# Patient Record
Sex: FEMALE | Race: WHITE | NOT HISPANIC OR LATINO | Employment: FULL TIME | URBAN - METROPOLITAN AREA
[De-identification: names, ages, dates, MRNs, and addresses within clinical notes are randomized per-mention and may not be internally consistent; named-entity substitution may affect disease eponyms.]

---

## 2017-01-04 ENCOUNTER — APPOINTMENT (OUTPATIENT)
Dept: DIABETES SERVICES | Facility: HOSPITAL | Age: 62
End: 2017-01-04

## 2017-01-04 PROCEDURE — G0109 DIAB MANAGE TRN IND/GROUP: HCPCS

## 2017-01-06 ENCOUNTER — TELEPHONE (OUTPATIENT)
Dept: INTERNAL MEDICINE | Facility: CLINIC | Age: 62
End: 2017-01-06

## 2017-01-06 NOTE — TELEPHONE ENCOUNTER
Farxiga still denied, tried to contact the drug rep about trying to get the copay card to work, even if the insurance denies the prescription, no answer/left voicemail.       Would you like to try and prescribe Jardiance for this pt, since it is on the formulary? A trial and fail would get the Farxiga approved.     Disregard    Got a hold of Farxiga rep, sometimes [facility] and [facility] will not take the copay card..pt is going to try a different pharmacy and see if they will use the card and let us know, will send the script to whichever pharmacy the pt needs.    Copay card is only invalid when dealing with non covered medicare.

## 2017-01-11 ENCOUNTER — APPOINTMENT (OUTPATIENT)
Dept: DIABETES SERVICES | Facility: HOSPITAL | Age: 62
End: 2017-01-11

## 2017-01-11 PROCEDURE — G0109 DIAB MANAGE TRN IND/GROUP: HCPCS

## 2017-01-23 DIAGNOSIS — E11.9 TYPE 2 DIABETES MELLITUS WITHOUT COMPLICATION, WITHOUT LONG-TERM CURRENT USE OF INSULIN (HCC): ICD-10-CM

## 2017-01-23 NOTE — TELEPHONE ENCOUNTER
Pt called and stated that she found a pharmacy that would accept the farxiga copay card, would like script sent to [facility] pharmacy on [address]

## 2017-02-01 ENCOUNTER — TELEPHONE (OUTPATIENT)
Dept: INTERNAL MEDICINE | Facility: CLINIC | Age: 62
End: 2017-02-01

## 2017-02-01 DIAGNOSIS — Z12.31 ENCOUNTER FOR SCREENING MAMMOGRAM FOR BREAST CANCER: Primary | ICD-10-CM

## 2017-02-09 ENCOUNTER — OFFICE VISIT (OUTPATIENT)
Dept: ORTHOPEDIC SURGERY | Facility: CLINIC | Age: 62
End: 2017-02-09

## 2017-02-09 VITALS — TEMPERATURE: 98.2 F | BODY MASS INDEX: 29.62 KG/M2 | HEIGHT: 69 IN | WEIGHT: 200 LBS

## 2017-02-09 DIAGNOSIS — M17.12 PRIMARY OSTEOARTHRITIS OF LEFT KNEE: Primary | ICD-10-CM

## 2017-02-09 PROCEDURE — 20610 DRAIN/INJ JOINT/BURSA W/O US: CPT | Performed by: ORTHOPAEDIC SURGERY

## 2017-02-09 RX ORDER — LIDOCAINE HYDROCHLORIDE 10 MG/ML
4 INJECTION, SOLUTION INFILTRATION; PERINEURAL
Status: COMPLETED | OUTPATIENT
Start: 2017-02-09 | End: 2017-02-09

## 2017-02-09 RX ORDER — METHYLPREDNISOLONE ACETATE 80 MG/ML
80 INJECTION, SUSPENSION INTRA-ARTICULAR; INTRALESIONAL; INTRAMUSCULAR; SOFT TISSUE
Status: COMPLETED | OUTPATIENT
Start: 2017-02-09 | End: 2017-02-09

## 2017-02-09 RX ORDER — BUPIVACAINE HYDROCHLORIDE 5 MG/ML
2 INJECTION, SOLUTION PERINEURAL
Status: COMPLETED | OUTPATIENT
Start: 2017-02-09 | End: 2017-02-09

## 2017-02-09 RX ADMIN — METHYLPREDNISOLONE ACETATE 80 MG: 80 INJECTION, SUSPENSION INTRA-ARTICULAR; INTRALESIONAL; INTRAMUSCULAR; SOFT TISSUE at 10:22

## 2017-02-09 RX ADMIN — BUPIVACAINE HYDROCHLORIDE 2 ML: 5 INJECTION, SOLUTION PERINEURAL at 10:22

## 2017-02-09 RX ADMIN — LIDOCAINE HYDROCHLORIDE 4 ML: 10 INJECTION, SOLUTION INFILTRATION; PERINEURAL at 10:22

## 2017-02-09 NOTE — PROGRESS NOTES
2/9/2017    Namrata Luz is here today for worsening knee pain. Pt has undergone injection of the knee in the past with good resolution of symptoms. Pt is requesting a repeat injection.     KNEE Injection Procedure Note:    Large Joint Arthrocentesis  Date/Time: 2/9/2017 10:22 AM  Consent given by: patient  Site marked: site marked  Timeout: Immediately prior to procedure a time out was called to verify the correct patient, procedure, equipment, support staff and site/side marked as required   Supporting Documentation  Indications: pain and joint swelling   Procedure Details  Location: knee - L knee  Preparation: Patient was prepped and draped in the usual sterile fashion  Needle size: 18 G  Approach: anterolateral  Medications administered: 4 mL lidocaine 1 %; 80 mg methylPREDNISolone acetate 80 MG/ML; 2 mL bupivacaine            At the conclusion of the injection I discussed the importance of continued quad strengthening exercises on a daily basis. I will see the patient back if the symptoms should fail to improve or worsen.    Emanuel Jack MD  2/9/2017

## 2017-02-17 RX ORDER — METOPROLOL SUCCINATE 100 MG/1
100 TABLET, EXTENDED RELEASE ORAL DAILY
Qty: 90 TABLET | Refills: 3 | Status: SHIPPED | OUTPATIENT
Start: 2017-02-17 | End: 2017-03-24 | Stop reason: SDUPTHER

## 2017-02-17 RX ORDER — METOPROLOL SUCCINATE 100 MG/1
100 TABLET, EXTENDED RELEASE ORAL DAILY
Qty: 30 TABLET | Refills: 0 | Status: SHIPPED | OUTPATIENT
Start: 2017-02-17 | End: 2017-02-17 | Stop reason: SDUPTHER

## 2017-02-17 NOTE — TELEPHONE ENCOUNTER
Pt ran out of prescription, needs 30 day sent to [facility] and [facility] Met[facility]rolol.

## 2017-02-20 ENCOUNTER — HOSPITAL ENCOUNTER (OUTPATIENT)
Dept: MAMMOGRAPHY | Facility: HOSPITAL | Age: 62
Discharge: HOME | End: 2017-02-20
Admitting: INTERNAL MEDICINE

## 2017-02-20 DIAGNOSIS — Z12.31 ENCOUNTER FOR SCREENING MAMMOGRAM FOR BREAST CANCER: ICD-10-CM

## 2017-02-20 PROCEDURE — G0202 SCR MAMMO BI INCL CAD: HCPCS

## 2017-02-20 PROCEDURE — 77063 BREAST TOMOSYNTHESIS BI: CPT

## 2017-03-05 LAB
ALBUMIN SERPL-MCNC: 4.2 G/DL (ref 3.6–4.8)
ALBUMIN/GLOB SERPL: 1.6 {RATIO} (ref 1.1–2.5)
ALP SERPL-CCNC: 70 IU/L (ref 39–117)
ALT SERPL-CCNC: 26 IU/L (ref 0–32)
AST SERPL-CCNC: 22 IU/L (ref 0–40)
BILIRUB SERPL-MCNC: 0.4 MG/DL (ref 0–1.2)
BUN SERPL-MCNC: 19 MG/DL (ref 8–27)
BUN/CREAT SERPL: 20 (ref 11–26)
CALCIUM SERPL-MCNC: 9.6 MG/DL (ref 8.7–10.3)
CHLORIDE SERPL-SCNC: 103 MMOL/L (ref 96–106)
CHOLEST SERPL-MCNC: 137 MG/DL (ref 100–199)
CO2 SERPL-SCNC: 23 MMOL/L (ref 18–29)
CREAT SERPL-MCNC: 0.97 MG/DL (ref 0.57–1)
GLOBULIN SER CALC-MCNC: 2.6 G/DL (ref 1.5–4.5)
GLUCOSE SERPL-MCNC: 115 MG/DL (ref 65–99)
HBA1C MFR BLD: 6.2 % (ref 4.8–5.6)
HCV AB S/CO SERPL IA: <0.1 S/CO RATIO (ref 0–0.9)
HDLC SERPL-MCNC: 36 MG/DL
LDLC SERPL CALC-MCNC: 66 MG/DL (ref 0–99)
POTASSIUM SERPL-SCNC: 4.7 MMOL/L (ref 3.5–5.2)
PROT SERPL-MCNC: 6.8 G/DL (ref 6–8.5)
SODIUM SERPL-SCNC: 141 MMOL/L (ref 134–144)
TRIGL SERPL-MCNC: 176 MG/DL (ref 0–149)
TSH SERPL DL<=0.005 MIU/L-ACNC: 2.36 UIU/ML (ref 0.45–4.5)
VLDLC SERPL CALC-MCNC: 35 MG/DL (ref 5–40)

## 2017-03-08 ENCOUNTER — OFFICE VISIT (OUTPATIENT)
Dept: INTERNAL MEDICINE | Facility: CLINIC | Age: 62
End: 2017-03-08

## 2017-03-08 VITALS
HEART RATE: 69 BPM | WEIGHT: 203 LBS | OXYGEN SATURATION: 99 % | SYSTOLIC BLOOD PRESSURE: 118 MMHG | HEIGHT: 69 IN | RESPIRATION RATE: 18 BRPM | DIASTOLIC BLOOD PRESSURE: 68 MMHG | BODY MASS INDEX: 30.07 KG/M2

## 2017-03-08 DIAGNOSIS — R79.89 ABNORMAL LIVER FUNCTION TESTS: ICD-10-CM

## 2017-03-08 DIAGNOSIS — E78.2 MIXED HYPERLIPIDEMIA: ICD-10-CM

## 2017-03-08 DIAGNOSIS — E03.9 ACQUIRED HYPOTHYROIDISM: ICD-10-CM

## 2017-03-08 DIAGNOSIS — E11.9 TYPE 2 DIABETES MELLITUS WITHOUT COMPLICATION, WITHOUT LONG-TERM CURRENT USE OF INSULIN (HCC): Primary | ICD-10-CM

## 2017-03-08 DIAGNOSIS — I10 ESSENTIAL HYPERTENSION: ICD-10-CM

## 2017-03-08 DIAGNOSIS — Z11.59 NEED FOR HEPATITIS C SCREENING TEST: ICD-10-CM

## 2017-03-08 DIAGNOSIS — Z78.0 POSTMENOPAUSAL: ICD-10-CM

## 2017-03-08 PROCEDURE — 99214 OFFICE O/P EST MOD 30 MIN: CPT | Performed by: INTERNAL MEDICINE

## 2017-03-08 PROCEDURE — 77080 DXA BONE DENSITY AXIAL: CPT | Performed by: INTERNAL MEDICINE

## 2017-03-08 RX ORDER — LANCETS
EACH MISCELLANEOUS
COMMUNITY
Start: 2017-02-09 | End: 2017-06-21 | Stop reason: SDUPTHER

## 2017-03-08 RX ORDER — BLOOD SUGAR DIAGNOSTIC
STRIP MISCELLANEOUS
COMMUNITY
Start: 2017-03-02 | End: 2017-06-21 | Stop reason: SDUPTHER

## 2017-03-08 NOTE — PROGRESS NOTES
Chief Complaint  Namrata Luz is a 61 y.o. female who presents for Hypertension; Hyperlipidemia; Hypothyroidism; Diabetes (F/U for Farxiga); and Follow-up (3 month, Dexa prior)  .    History of Present Illness   DM2 - She is doing her PT exercises for her knees.  The farxiga can cause some constipation so she had not been using her colestipol.  She has lost some weight.  She went to all of the diabetic classes.  She is trying to do a Paleo type diet.  She checks her bs daily.    HTN - no cp or palp or soa or edema.      HLD - no myalgias.      Hypothyroid - no symptoms.      DEXA reviewed.  She has excellent bone density.    Review of Systems   Constitutional: Negative for chills and fever.   Respiratory: Negative for chest tightness and shortness of breath.    Cardiovascular: Negative for chest pain, palpitations and leg swelling.   Gastrointestinal: Negative for constipation.   Endocrine: Negative for polydipsia and polyuria.   Neurological: Negative for dizziness and light-headedness.       Patient Active Problem List   Diagnosis   • Abnormal liver function tests   • Anxiety   • Asthma   • Mixed anxiety depressive disorder   • Diarrhea   • Headache   • Hyperlipidemia   • Hypertension   • Hypothyroidism   • Lung mass   • Neck pain   • Psoriasis   • Vitamin D deficiency   • Right medial knee pain   • Effusion of right knee   • Choking   • Type 2 diabetes mellitus without complication   • Hoarseness       Past Medical History   Diagnosis Date   • Abnormal LFTs    • Allergic rhinitis    • Anxiety    • Arthritis    • Asthma    • Depression    • Disease of thyroid gland    • Endometriosis    • Fatty liver    • Hyperlipidemia    • Hypertension    • Lung nodule    • Mitral valve prolapse    • Prediabetes    • Psoriasis    • Renal insufficiency    • Tachycardia    • Vitamin D deficiency        Past Surgical History   Procedure Laterality Date   • Cholecystectomy     • Hysterectomy     • Tubal abdominal ligation     •  Cataract extraction, bilateral     • Back surgery     • Tibia fasciotomy         Family History   Problem Relation Age of Onset   • Vaginal cancer Mother    • Lung cancer Mother    • Arthritis Mother    • Diabetes Mother    • Coronary artery disease Father    • Arthritis Father    • Kidney cancer Father    • Coronary artery disease Brother    • Asthma Sister    • Osteoporosis Sister    • Osteoporosis Maternal Grandmother        Social History     Social History   • Marital status:      Spouse name: N/A   • Number of children: N/A   • Years of education: N/A     Occupational History   • Not on file.     Social History Main Topics   • Smoking status: Never Smoker   • Smokeless tobacco: Not on file   • Alcohol use Yes      Comment: 1-2 a month   • Drug use: No   • Sexual activity: Not on file     Other Topics Concern   • Not on file     Social History Narrative       Current Outpatient Prescriptions on File Prior to Visit   Medication Sig Dispense Refill   • albuterol (PROVENTIL HFA;VENTOLIN HFA) 108 (90 BASE) MCG/ACT inhaler Proventil  (90 Base) MCG/ACT Inhalation Aerosol Solution; Patient Sig: Proventil  (90 Base) MCG/ACT Inhalation Aerosol Solution INHALE 1 TO 2 PUFFS EVERY 4 TO 6 HOURS AS NEEDED.; 1; 5; 07-Apr-2014; Active     • atorvastatin (LIPITOR) 40 MG tablet TAKE 1 TABLET DAILY AS DIRECTED 90 tablet 0   • budesonide-formoterol (SYMBICORT) 80-4.5 MCG/ACT inhaler Inhale 2 puffs 2 (two) times a day. 1 inhaler 2   • clobetasol (TEMOVATE) 0.05 % external solution Apply  topically 2 (Two) Times a Day. 50 mL 2   • colestipol (COLESTID) 1 G tablet Take 1 tablet by mouth Daily. Take 1 to 2 tablets by mouth daily as needed. 45 tablet 3   • Dapagliflozin Propanediol (FARXIGA) 10 MG tablet Take 10 mg by mouth Daily. 30 tablet 5   • fenofibrate (TRICOR) 145 MG tablet Take 1 tablet by mouth daily. 90 tablet 3   • fluocinolone (SYNALAR) 0.01 % external solution Apply  topically 2 (Two) Times a Day. 60  "mL 2   • FLUoxetine (PROzac) 10 MG capsule TAKE ONE CAPSULE BY MOUTH DAILY 30 capsule 5   • levothyroxine (SYNTHROID, LEVOTHROID) 88 MCG tablet Take 1 tablet by mouth Daily. 90 tablet 3   • metFORMIN (GLUCOPHAGE) 1000 MG tablet TAKE 1 TABLET TWICE A  tablet 1   • metoprolol succinate XL (TOPROL-XL) 100 MG 24 hr tablet Take 1 tablet by mouth Daily. 90 tablet 3     No current facility-administered medications on file prior to visit.        Allergies   Allergen Reactions   • Morphine And Related    • Penicillins        Vitals:    03/08/17 1024   BP: 118/68   Pulse: 69   Resp: 18   SpO2: 99%   Weight: 203 lb (92.1 kg)   Height: 68.5\" (174 cm)       Body mass index is 30.42 kg/(m^2).    Objective   Physical Exam   Constitutional: She is oriented to person, place, and time. She appears well-developed and well-nourished. No distress.   HENT:   Head: Normocephalic and atraumatic.   Eyes: Conjunctivae are normal. No scleral icterus.   Neck: Normal range of motion. Neck supple.   Cardiovascular: Normal rate, regular rhythm and normal heart sounds.  Exam reveals no gallop and no friction rub.    No murmur heard.  Pulmonary/Chest: Effort normal and breath sounds normal. No respiratory distress. She has no wheezes. She has no rales.   Musculoskeletal: She exhibits no edema.   Lymphadenopathy:     She has no cervical adenopathy.   Neurological: She is alert and oriented to person, place, and time. No cranial nerve deficit.   Psychiatric: She has a normal mood and affect. Her behavior is normal. Judgment and thought content normal.       Results for orders placed or performed in visit on 03/08/17   TSH   Result Value Ref Range    TSH 2.360 0.450 - 4.500 uIU/mL   Comprehensive Metabolic Panel   Result Value Ref Range    Glucose 115 (H) 65 - 99 mg/dL    BUN 19 8 - 27 mg/dL    Creatinine 0.97 0.57 - 1.00 mg/dL    eGFR Non African Am 63 >59 mL/min/1.73    eGFR African Am 73 >59 mL/min/1.73    BUN/Creatinine Ratio 20 11 - 26    " Sodium 141 134 - 144 mmol/L    Potassium 4.7 3.5 - 5.2 mmol/L    Chloride 103 96 - 106 mmol/L    Total CO2 23 18 - 29 mmol/L    Calcium 9.6 8.7 - 10.3 mg/dL    Total Protein 6.8 6.0 - 8.5 g/dL    Albumin 4.2 3.6 - 4.8 g/dL    Globulin 2.6 1.5 - 4.5 g/dL    A/G Ratio 1.6 1.1 - 2.5    Total Bilirubin 0.4 0.0 - 1.2 mg/dL    Alkaline Phosphatase 70 39 - 117 IU/L    AST (SGOT) 22 0 - 40 IU/L    ALT (SGPT) 26 0 - 32 IU/L   Lipid Panel   Result Value Ref Range    Total Cholesterol 137 100 - 199 mg/dL    Triglycerides 176 (H) 0 - 149 mg/dL    HDL Cholesterol 36 (L) >39 mg/dL    VLDL Cholesterol 35 5 - 40 mg/dL    LDL Cholesterol  66 0 - 99 mg/dL   Hemoglobin A1c   Result Value Ref Range    Hemoglobin A1C 6.2 (H) 4.8 - 5.6 %   Hepatitis C Antibody   Result Value Ref Range    Hep C Virus Ab <0.1 0.0 - 0.9 s/co ratio       Assessment/Plan   Diagnoses and all orders for this visit:    Type 2 diabetes mellitus without complication, without long-term current use of insulin  -     Hemoglobin A1c  -     Comprehensive Metabolic Panel    Essential hypertension  -     Comprehensive Metabolic Panel    Mixed hyperlipidemia  -     Comprehensive Metabolic Panel  -     Lipid Panel    Need for hepatitis C screening test  -     Hepatitis C Antibody    Abnormal liver function tests  -     Comprehensive Metabolic Panel    Acquired hypothyroidism  -     TSH    Other orders  -     Comprehensive Metabolic Panel  -     Lipid Panel  -     Hemoglobin A1c  -     Hepatitis C Antibody  -     ACCU-CHEK KARMA PLUS test strip;   -     ACCU-CHEK SOFTCLIX LANCETS lancets;         Discussion/Summary  Namrata is here for follow up.  She has done an excellent job of getting the diabetes under control.  She is doing well on Farxiga.   Her bp is very well controlled.  Her thyroid looks good.  Her liver enzymes have normalized.  Continue all current meds.   Return in about 6 months (around 9/8/2017) for Annual physical, with fasting labs prior.

## 2017-03-23 RX ORDER — ATORVASTATIN CALCIUM 40 MG/1
TABLET, FILM COATED ORAL
Qty: 90 TABLET | Refills: 2 | Status: SHIPPED | OUTPATIENT
Start: 2017-03-23 | End: 2018-05-02 | Stop reason: SDUPTHER

## 2017-03-24 RX ORDER — METOPROLOL SUCCINATE 100 MG/1
100 TABLET, EXTENDED RELEASE ORAL DAILY
Qty: 90 TABLET | Refills: 4 | Status: SHIPPED | OUTPATIENT
Start: 2017-03-24 | End: 2018-05-02 | Stop reason: SDUPTHER

## 2017-05-25 ENCOUNTER — OFFICE VISIT (OUTPATIENT)
Dept: ORTHOPEDIC SURGERY | Facility: CLINIC | Age: 62
End: 2017-05-25

## 2017-05-25 DIAGNOSIS — M17.12 PRIMARY OSTEOARTHRITIS OF LEFT KNEE: Primary | ICD-10-CM

## 2017-05-25 PROCEDURE — 20610 DRAIN/INJ JOINT/BURSA W/O US: CPT | Performed by: NURSE PRACTITIONER

## 2017-05-25 RX ORDER — METHYLPREDNISOLONE ACETATE 80 MG/ML
80 INJECTION, SUSPENSION INTRA-ARTICULAR; INTRALESIONAL; INTRAMUSCULAR; SOFT TISSUE
Status: COMPLETED | OUTPATIENT
Start: 2017-05-25 | End: 2017-05-25

## 2017-05-25 RX ORDER — BUPIVACAINE HYDROCHLORIDE 2.5 MG/ML
2 INJECTION, SOLUTION INFILTRATION; PERINEURAL
Status: COMPLETED | OUTPATIENT
Start: 2017-05-25 | End: 2017-05-25

## 2017-05-25 RX ORDER — LIDOCAINE HYDROCHLORIDE 10 MG/ML
4 INJECTION, SOLUTION INFILTRATION; PERINEURAL
Status: COMPLETED | OUTPATIENT
Start: 2017-05-25 | End: 2017-05-25

## 2017-05-25 RX ADMIN — METHYLPREDNISOLONE ACETATE 80 MG: 80 INJECTION, SUSPENSION INTRA-ARTICULAR; INTRALESIONAL; INTRAMUSCULAR; SOFT TISSUE at 11:39

## 2017-05-25 RX ADMIN — LIDOCAINE HYDROCHLORIDE 4 ML: 10 INJECTION, SOLUTION INFILTRATION; PERINEURAL at 11:39

## 2017-05-25 RX ADMIN — BUPIVACAINE HYDROCHLORIDE 2 ML: 2.5 INJECTION, SOLUTION INFILTRATION; PERINEURAL at 11:39

## 2017-06-21 RX ORDER — FLUOXETINE 10 MG/1
10 CAPSULE ORAL DAILY
Qty: 90 CAPSULE | Refills: 3 | Status: SHIPPED | OUTPATIENT
Start: 2017-06-21 | End: 2018-08-12 | Stop reason: SDUPTHER

## 2017-06-21 RX ORDER — LANCETS
EACH MISCELLANEOUS
Qty: 100 EACH | Refills: 3 | Status: SHIPPED | OUTPATIENT
Start: 2017-06-21 | End: 2018-11-14 | Stop reason: SDUPTHER

## 2017-06-21 RX ORDER — BLOOD SUGAR DIAGNOSTIC
STRIP MISCELLANEOUS
Qty: 100 EACH | Refills: 3 | Status: SHIPPED | OUTPATIENT
Start: 2017-06-21 | End: 2018-06-01 | Stop reason: HOSPADM

## 2017-07-24 RX ORDER — FENOFIBRATE 145 MG/1
TABLET, COATED ORAL
Qty: 90 TABLET | Refills: 2 | Status: SHIPPED | OUTPATIENT
Start: 2017-07-24 | End: 2018-05-02 | Stop reason: SDUPTHER

## 2017-07-28 DIAGNOSIS — E11.9 TYPE 2 DIABETES MELLITUS WITHOUT COMPLICATION, WITHOUT LONG-TERM CURRENT USE OF INSULIN (HCC): ICD-10-CM

## 2017-07-28 RX ORDER — DAPAGLIFLOZIN 10 MG/1
TABLET, FILM COATED ORAL
Qty: 30 TABLET | Refills: 5 | Status: SHIPPED | OUTPATIENT
Start: 2017-07-28 | End: 2017-08-29 | Stop reason: SDUPTHER

## 2017-08-29 DIAGNOSIS — E11.9 TYPE 2 DIABETES MELLITUS WITHOUT COMPLICATION, WITHOUT LONG-TERM CURRENT USE OF INSULIN (HCC): ICD-10-CM

## 2017-08-29 NOTE — TELEPHONE ENCOUNTER
Pt stated that [facility] will no longer fill the script for free, [facility]

## 2017-08-30 ENCOUNTER — TELEPHONE (OUTPATIENT)
Dept: INTERNAL MEDICINE | Facility: CLINIC | Age: 62
End: 2017-08-30

## 2017-08-30 NOTE — TELEPHONE ENCOUNTER
Pt stated that the farxiga copay card is not working anymore, insurance will not cover until pt tries and fails jardiance we can get the farxiga approved. Or if the pt tries jardiance and tolerates it well we will send in a script for that and insurance will cover it.

## 2017-09-05 ENCOUNTER — TELEPHONE (OUTPATIENT)
Dept: INTERNAL MEDICINE | Facility: CLINIC | Age: 62
End: 2017-09-05

## 2017-09-05 NOTE — TELEPHONE ENCOUNTER
Pt called and stated that she is tolerating the Jardiance well and would like a script of that sent in to her [facility].

## 2017-09-08 ENCOUNTER — CLINICAL SUPPORT (OUTPATIENT)
Dept: ORTHOPEDIC SURGERY | Facility: CLINIC | Age: 62
End: 2017-09-08

## 2017-09-08 VITALS — TEMPERATURE: 97.3 F | WEIGHT: 206 LBS | HEIGHT: 68 IN | BODY MASS INDEX: 31.22 KG/M2

## 2017-09-08 DIAGNOSIS — R52 PAIN: Primary | ICD-10-CM

## 2017-09-08 PROCEDURE — 20610 DRAIN/INJ JOINT/BURSA W/O US: CPT | Performed by: NURSE PRACTITIONER

## 2017-09-08 RX ORDER — BUPIVACAINE HYDROCHLORIDE 5 MG/ML
2 INJECTION, SOLUTION EPIDURAL; INTRACAUDAL
Status: COMPLETED | OUTPATIENT
Start: 2017-09-08 | End: 2017-09-08

## 2017-09-08 RX ORDER — METHYLPREDNISOLONE ACETATE 80 MG/ML
80 INJECTION, SUSPENSION INTRA-ARTICULAR; INTRALESIONAL; INTRAMUSCULAR; SOFT TISSUE
Status: COMPLETED | OUTPATIENT
Start: 2017-09-08 | End: 2017-09-08

## 2017-09-08 RX ADMIN — BUPIVACAINE HYDROCHLORIDE 2 ML: 5 INJECTION, SOLUTION EPIDURAL; INTRACAUDAL at 16:01

## 2017-09-08 RX ADMIN — METHYLPREDNISOLONE ACETATE 80 MG: 80 INJECTION, SUSPENSION INTRA-ARTICULAR; INTRALESIONAL; INTRAMUSCULAR; SOFT TISSUE at 16:01

## 2017-09-08 NOTE — PROGRESS NOTES
9/8/2017    Namrata Luz is here today for worsening knee pain. Pt has undergone injection of the knee in the past with good resolution of symptoms. Pt is requesting a repeat injection.     KNEE Injection Procedure Note:    Large Joint Arthrocentesis  Date/Time: 9/8/2017 4:01 PM  Consent given by: patient  Timeout: Immediately prior to procedure a time out was called to verify the correct patient, procedure, equipment, support staff and site/side marked as required   Supporting Documentation  Indications: pain   Procedure Details  Location: knee - L knee  Preparation: Patient was prepped and draped in the usual sterile fashion  Needle size: 22 G  Approach: anterolateral  Medications administered: 2 mL bupivacaine (PF) 0.5 %; 80 mg methylPREDNISolone acetate 80 MG/ML  Patient tolerance: patient tolerated the procedure well with no immediate complications          At the conclusion of the injection I discussed the importance of continued quad strengthening exercises on a daily basis. I will see the patient back if the symptoms should fail to improve or worsen.    Holly Smallwood APRN  9/8/2017

## 2017-09-13 ENCOUNTER — HOSPITAL ENCOUNTER (OUTPATIENT)
Dept: CARDIOLOGY | Facility: HOSPITAL | Age: 62
Discharge: HOME | End: 2017-09-13

## 2017-09-13 ENCOUNTER — HOSPITAL ENCOUNTER (OUTPATIENT)
Dept: CARDIOLOGY | Facility: HOSPITAL | Age: 62
Discharge: HOME | End: 2017-09-13
Admitting: INTERNAL MEDICINE

## 2017-09-13 ENCOUNTER — APPOINTMENT (OUTPATIENT)
Dept: GENERAL RADIOLOGY | Facility: HOSPITAL | Age: 62
End: 2017-09-13

## 2017-09-13 ENCOUNTER — HOSPITAL ENCOUNTER (EMERGENCY)
Facility: HOSPITAL | Age: 62
Discharge: HOME | End: 2017-09-13
Attending: EMERGENCY MEDICINE | Admitting: EMERGENCY MEDICINE

## 2017-09-13 VITALS
HEART RATE: 80 BPM | WEIGHT: 206 LBS | BODY MASS INDEX: 31.22 KG/M2 | OXYGEN SATURATION: 92 % | HEIGHT: 68 IN | DIASTOLIC BLOOD PRESSURE: 51 MMHG | TEMPERATURE: 98.3 F | RESPIRATION RATE: 18 BRPM | SYSTOLIC BLOOD PRESSURE: 90 MMHG

## 2017-09-13 VITALS
SYSTOLIC BLOOD PRESSURE: 96 MMHG | WEIGHT: 206 LBS | HEART RATE: 81 BPM | BODY MASS INDEX: 31.22 KG/M2 | OXYGEN SATURATION: 95 % | DIASTOLIC BLOOD PRESSURE: 66 MMHG | HEIGHT: 68 IN

## 2017-09-13 VITALS
WEIGHT: 206 LBS | RESPIRATION RATE: 20 BRPM | OXYGEN SATURATION: 94 % | HEIGHT: 68 IN | DIASTOLIC BLOOD PRESSURE: 66 MMHG | HEART RATE: 69 BPM | SYSTOLIC BLOOD PRESSURE: 95 MMHG | BODY MASS INDEX: 31.22 KG/M2

## 2017-09-13 DIAGNOSIS — E78.2 MIXED HYPERLIPIDEMIA: ICD-10-CM

## 2017-09-13 DIAGNOSIS — E11.9 TYPE 2 DIABETES MELLITUS WITHOUT COMPLICATION, WITHOUT LONG-TERM CURRENT USE OF INSULIN (HCC): ICD-10-CM

## 2017-09-13 DIAGNOSIS — R89.9 ABNORMAL LABORATORY TEST: ICD-10-CM

## 2017-09-13 DIAGNOSIS — R07.9 CHEST PAIN, UNSPECIFIED TYPE: Primary | ICD-10-CM

## 2017-09-13 DIAGNOSIS — R07.9 ACUTE CHEST PAIN: Primary | ICD-10-CM

## 2017-09-13 DIAGNOSIS — I34.1 MITRAL VALVE PROLAPSE: ICD-10-CM

## 2017-09-13 DIAGNOSIS — I10 ESSENTIAL HYPERTENSION: ICD-10-CM

## 2017-09-13 LAB
ALBUMIN SERPL-MCNC: 4.5 G/DL (ref 3.5–5.2)
ALBUMIN/GLOB SERPL: 1.4 G/DL
ALP SERPL-CCNC: 76 U/L (ref 39–117)
ALT SERPL W P-5'-P-CCNC: 56 U/L (ref 1–33)
ANION GAP SERPL CALCULATED.3IONS-SCNC: 13.3 MMOL/L
AST SERPL-CCNC: 52 U/L (ref 1–32)
BASOPHILS # BLD AUTO: 0.04 10*3/MM3 (ref 0–0.2)
BASOPHILS NFR BLD AUTO: 0.4 % (ref 0–1.5)
BH CV ECHO MEAS - ACS: 1.9 CM
BH CV ECHO MEAS - AO MAX PG: 7.4 MMHG
BH CV ECHO MEAS - AO ROOT AREA (BSA CORRECTED): 1.6
BH CV ECHO MEAS - AO ROOT AREA: 8.1 CM^2
BH CV ECHO MEAS - AO ROOT DIAM: 3.2 CM
BH CV ECHO MEAS - AO V2 MAX: 135.7 CM/SEC
BH CV ECHO MEAS - BSA(HAYCOCK): 2.1 M^2
BH CV ECHO MEAS - BSA: 2.1 M^2
BH CV ECHO MEAS - BZI_BMI: 31.3 KILOGRAMS/M^2
BH CV ECHO MEAS - BZI_METRIC_HEIGHT: 172.7 CM
BH CV ECHO MEAS - BZI_METRIC_WEIGHT: 93.4 KG
BH CV ECHO MEAS - CONTRAST EF 4CH: 71.1 ML/M^2
BH CV ECHO MEAS - EDV(MOD-SP4): 38 ML
BH CV ECHO MEAS - EDV(TEICH): 106.4 ML
BH CV ECHO MEAS - EF(CUBED): 74.2 %
BH CV ECHO MEAS - EF(MOD-SP4): 67 %
BH CV ECHO MEAS - EF(TEICH): 66 %
BH CV ECHO MEAS - ESV(MOD-SP4): 11 ML
BH CV ECHO MEAS - ESV(TEICH): 36.2 ML
BH CV ECHO MEAS - FS: 36.4 %
BH CV ECHO MEAS - IVS/LVPW: 1
BH CV ECHO MEAS - IVSD: 1.1 CM
BH CV ECHO MEAS - LAT PEAK E' VEL: 8 CM/SEC
BH CV ECHO MEAS - LV DIASTOLIC VOL/BSA (35-75): 18.4 ML/M^2
BH CV ECHO MEAS - LV MASS(C)D: 199.7 GRAMS
BH CV ECHO MEAS - LV MASS(C)DI: 96.5 GRAMS/M^2
BH CV ECHO MEAS - LV SYSTOLIC VOL/BSA (12-30): 5.3 ML/M^2
BH CV ECHO MEAS - LVIDD: 4.8 CM
BH CV ECHO MEAS - LVIDS: 3 CM
BH CV ECHO MEAS - LVLD AP4: 6.7 CM
BH CV ECHO MEAS - LVLS AP4: 5.1 CM
BH CV ECHO MEAS - LVPWD: 1.1 CM
BH CV ECHO MEAS - MED PEAK E' VEL: 7 CM/SEC
BH CV ECHO MEAS - MV A DUR: 0.11 SEC
BH CV ECHO MEAS - MV A MAX VEL: 64 CM/SEC
BH CV ECHO MEAS - MV DEC SLOPE: 157.1 CM/SEC^2
BH CV ECHO MEAS - MV DEC TIME: 0.22 SEC
BH CV ECHO MEAS - MV E MAX VEL: 34.4 CM/SEC
BH CV ECHO MEAS - MV E/A: 0.54
BH CV ECHO MEAS - MV P1/2T MAX VEL: 39.3 CM/SEC
BH CV ECHO MEAS - MV P1/2T: 73.2 MSEC
BH CV ECHO MEAS - MVA P1/2T LCG: 5.6 CM^2
BH CV ECHO MEAS - MVA(P1/2T): 3 CM^2
BH CV ECHO MEAS - PULM A REVS DUR: 0.11 SEC
BH CV ECHO MEAS - PULM A REVS VEL: 34.9 CM/SEC
BH CV ECHO MEAS - PULM DIAS VEL: 33 CM/SEC
BH CV ECHO MEAS - PULM S/D: 1.5
BH CV ECHO MEAS - PULM SYS VEL: 48 CM/SEC
BH CV ECHO MEAS - SI(CUBED): 39.1 ML/M^2
BH CV ECHO MEAS - SI(MOD-SP4): 13 ML/M^2
BH CV ECHO MEAS - SI(TEICH): 33.9 ML/M^2
BH CV ECHO MEAS - SV(CUBED): 81 ML
BH CV ECHO MEAS - SV(MOD-SP4): 27 ML
BH CV ECHO MEAS - SV(TEICH): 70.2 ML
BH CV STRESS BP STAGE 1: NORMAL
BH CV STRESS DURATION MIN STAGE 1: 3
BH CV STRESS DURATION MIN STAGE 2: 2
BH CV STRESS DURATION SEC STAGE 1: 0
BH CV STRESS DURATION SEC STAGE 2: 0
BH CV STRESS ECHO POST STRESS EJECTION FRACTION EF: 72 %
BH CV STRESS GRADE STAGE 1: 10
BH CV STRESS GRADE STAGE 2: 12
BH CV STRESS HR STAGE 1: 126
BH CV STRESS HR STAGE 2: 142
BH CV STRESS METS STAGE 1: 5
BH CV STRESS METS STAGE 2: 7.5
BH CV STRESS PROTOCOL 1: NORMAL
BH CV STRESS SPEED STAGE 1: 1.7
BH CV STRESS SPEED STAGE 2: 2.5
BH CV STRESS STAGE 1: 1
BH CV STRESS STAGE 2: 2
BH CV XLRA - TDI S': 10 CM/SEC
BILIRUB SERPL-MCNC: 0.6 MG/DL (ref 0.1–1.2)
BILIRUB UR QL STRIP: NEGATIVE
BUN BLD-MCNC: 20 MG/DL (ref 8–23)
BUN/CREAT SERPL: 19.8 (ref 7–25)
CALCIUM SPEC-SCNC: 9.6 MG/DL (ref 8.6–10.5)
CHLORIDE SERPL-SCNC: 101 MMOL/L (ref 98–107)
CLARITY UR: CLEAR
CO2 SERPL-SCNC: 27.7 MMOL/L (ref 22–29)
COLOR UR: YELLOW
CREAT BLD-MCNC: 1.01 MG/DL (ref 0.57–1)
DEPRECATED RDW RBC AUTO: 47.6 FL (ref 37–54)
E/E' RATIO: 4.5
EOSINOPHIL # BLD AUTO: 0.51 10*3/MM3 (ref 0–0.7)
EOSINOPHIL NFR BLD AUTO: 5.5 % (ref 0.3–6.2)
ERYTHROCYTE [DISTWIDTH] IN BLOOD BY AUTOMATED COUNT: 14.3 % (ref 11.7–13)
GFR SERPL CREATININE-BSD FRML MDRD: 56 ML/MIN/1.73
GLOBULIN UR ELPH-MCNC: 3.2 GM/DL
GLUCOSE BLD-MCNC: 134 MG/DL (ref 65–99)
GLUCOSE BLDC GLUCOMTR-MCNC: 107 MG/DL (ref 70–130)
GLUCOSE UR STRIP-MCNC: ABNORMAL MG/DL
HCT VFR BLD AUTO: 48.3 % (ref 35.6–45.5)
HGB BLD-MCNC: 16.2 G/DL (ref 11.9–15.5)
HGB UR QL STRIP.AUTO: NEGATIVE
HOLD SPECIMEN: NORMAL
HOLD SPECIMEN: NORMAL
IMM GRANULOCYTES # BLD: 0.05 10*3/MM3 (ref 0–0.03)
IMM GRANULOCYTES NFR BLD: 0.5 % (ref 0–0.5)
INR PPP: 0.94 (ref 0.9–1.1)
KETONES UR QL STRIP: NEGATIVE
LEFT ATRIUM VOLUME INDEX: 11 ML/M2
LEUKOCYTE ESTERASE UR QL STRIP.AUTO: NEGATIVE
LIPASE SERPL-CCNC: 50 U/L (ref 13–60)
LYMPHOCYTES # BLD AUTO: 1.5 10*3/MM3 (ref 0.9–4.8)
LYMPHOCYTES NFR BLD AUTO: 16.1 % (ref 19.6–45.3)
MAXIMAL PREDICTED HEART RATE: 159 BPM
MCH RBC QN AUTO: 30.5 PG (ref 26.9–32)
MCHC RBC AUTO-ENTMCNC: 33.5 G/DL (ref 32.4–36.3)
MCV RBC AUTO: 90.8 FL (ref 80.5–98.2)
MONOCYTES # BLD AUTO: 0.51 10*3/MM3 (ref 0.2–1.2)
MONOCYTES NFR BLD AUTO: 5.5 % (ref 5–12)
NEUTROPHILS # BLD AUTO: 6.72 10*3/MM3 (ref 1.9–8.1)
NEUTROPHILS NFR BLD AUTO: 72 % (ref 42.7–76)
NITRITE UR QL STRIP: NEGATIVE
PERCENT MAX PREDICTED HR: 89.31 %
PH UR STRIP.AUTO: 5.5 [PH] (ref 5–8)
PLATELET # BLD AUTO: 320 10*3/MM3 (ref 140–500)
PMV BLD AUTO: 9.9 FL (ref 6–12)
POTASSIUM BLD-SCNC: 4.4 MMOL/L (ref 3.5–5.2)
PROT SERPL-MCNC: 7.7 G/DL (ref 6–8.5)
PROT UR QL STRIP: NEGATIVE
PROTHROMBIN TIME: 12.2 SECONDS (ref 11.7–14.2)
RBC # BLD AUTO: 5.32 10*6/MM3 (ref 3.9–5.2)
SODIUM BLD-SCNC: 142 MMOL/L (ref 136–145)
SP GR UR STRIP: >=1.03 (ref 1–1.03)
STRESS BASELINE BP: NORMAL MMHG
STRESS BASELINE HR: 81 BPM
STRESS PERCENT HR: 105 %
STRESS POST ESTIMATED WORKLOAD: 6 METS
STRESS POST EXERCISE DUR MIN: 5 MIN
STRESS POST EXERCISE DUR SEC: 0 SEC
STRESS POST PEAK BP: NORMAL MMHG
STRESS POST PEAK HR: 142 BPM
STRESS TARGET HR: 135 BPM
TROPONIN T SERPL-MCNC: <0.01 NG/ML (ref 0–0.03)
TROPONIN T SERPL-MCNC: <0.01 NG/ML (ref 0–0.03)
UROBILINOGEN UR QL STRIP: ABNORMAL
WBC NRBC COR # BLD: 9.33 10*3/MM3 (ref 4.5–10.7)
WHOLE BLOOD HOLD SPECIMEN: NORMAL
WHOLE BLOOD HOLD SPECIMEN: NORMAL

## 2017-09-13 PROCEDURE — 93018 CV STRESS TEST I&R ONLY: CPT | Performed by: INTERNAL MEDICINE

## 2017-09-13 PROCEDURE — 93016 CV STRESS TEST SUPVJ ONLY: CPT | Performed by: INTERNAL MEDICINE

## 2017-09-13 PROCEDURE — 81003 URINALYSIS AUTO W/O SCOPE: CPT | Performed by: EMERGENCY MEDICINE

## 2017-09-13 PROCEDURE — 93010 ELECTROCARDIOGRAM REPORT: CPT | Performed by: INTERNAL MEDICINE

## 2017-09-13 PROCEDURE — 94760 N-INVAS EAR/PLS OXIMETRY 1: CPT

## 2017-09-13 PROCEDURE — 93005 ELECTROCARDIOGRAM TRACING: CPT | Performed by: EMERGENCY MEDICINE

## 2017-09-13 PROCEDURE — 84484 ASSAY OF TROPONIN QUANT: CPT | Performed by: NURSE PRACTITIONER

## 2017-09-13 PROCEDURE — 93320 DOPPLER ECHO COMPLETE: CPT

## 2017-09-13 PROCEDURE — 93325 DOPPLER ECHO COLOR FLOW MAPG: CPT

## 2017-09-13 PROCEDURE — 99285 EMERGENCY DEPT VISIT HI MDM: CPT

## 2017-09-13 PROCEDURE — C8928 TTE W OR W/O FOL W/CON,STRES: HCPCS

## 2017-09-13 PROCEDURE — 93325 DOPPLER ECHO COLOR FLOW MAPG: CPT | Performed by: INTERNAL MEDICINE

## 2017-09-13 PROCEDURE — 93320 DOPPLER ECHO COMPLETE: CPT | Performed by: INTERNAL MEDICINE

## 2017-09-13 PROCEDURE — 85025 COMPLETE CBC W/AUTO DIFF WBC: CPT | Performed by: EMERGENCY MEDICINE

## 2017-09-13 PROCEDURE — 93005 ELECTROCARDIOGRAM TRACING: CPT

## 2017-09-13 PROCEDURE — 84484 ASSAY OF TROPONIN QUANT: CPT | Performed by: EMERGENCY MEDICINE

## 2017-09-13 PROCEDURE — 93010 ELECTROCARDIOGRAM REPORT: CPT | Performed by: NURSE PRACTITIONER

## 2017-09-13 PROCEDURE — 80053 COMPREHEN METABOLIC PANEL: CPT | Performed by: EMERGENCY MEDICINE

## 2017-09-13 PROCEDURE — 85610 PROTHROMBIN TIME: CPT | Performed by: EMERGENCY MEDICINE

## 2017-09-13 PROCEDURE — 71020 HC CHEST PA AND LATERAL: CPT

## 2017-09-13 PROCEDURE — 93352 ADMIN ECG CONTRAST AGENT: CPT | Performed by: INTERNAL MEDICINE

## 2017-09-13 PROCEDURE — 25010000002 PERFLUTREN (DEFINITY) 8.476 MG IN SODIUM CHLORIDE 10 ML INJECTION: Performed by: NURSE PRACTITIONER

## 2017-09-13 PROCEDURE — 99204 OFFICE O/P NEW MOD 45 MIN: CPT | Performed by: NURSE PRACTITIONER

## 2017-09-13 PROCEDURE — 93005 ELECTROCARDIOGRAM TRACING: CPT | Performed by: NURSE PRACTITIONER

## 2017-09-13 PROCEDURE — 93017 CV STRESS TEST TRACING ONLY: CPT

## 2017-09-13 PROCEDURE — 93350 STRESS TTE ONLY: CPT | Performed by: INTERNAL MEDICINE

## 2017-09-13 PROCEDURE — 83690 ASSAY OF LIPASE: CPT | Performed by: EMERGENCY MEDICINE

## 2017-09-13 RX ORDER — NITROGLYCERIN 0.4 MG/1
0.4 TABLET SUBLINGUAL
Status: DISCONTINUED | OUTPATIENT
Start: 2017-09-13 | End: 2017-09-13

## 2017-09-13 RX ORDER — ASPIRIN 325 MG
325 TABLET ORAL ONCE
Status: COMPLETED | OUTPATIENT
Start: 2017-09-13 | End: 2017-09-13

## 2017-09-13 RX ORDER — SODIUM CHLORIDE 0.9 % (FLUSH) 0.9 %
10 SYRINGE (ML) INJECTION AS NEEDED
Status: DISCONTINUED | OUTPATIENT
Start: 2017-09-13 | End: 2017-09-13 | Stop reason: HOSPADM

## 2017-09-13 RX ORDER — SODIUM CHLORIDE 0.9 % (FLUSH) 0.9 %
10 SYRINGE (ML) INJECTION AS NEEDED
Status: DISCONTINUED | OUTPATIENT
Start: 2017-09-13 | End: 2017-09-13

## 2017-09-13 RX ORDER — NITROGLYCERIN 0.4 MG/1
0.4 TABLET SUBLINGUAL
Status: COMPLETED | OUTPATIENT
Start: 2017-09-13 | End: 2017-09-13

## 2017-09-13 RX ADMIN — NITROGLYCERIN 0.4 MG: 0.4 TABLET SUBLINGUAL at 08:47

## 2017-09-13 RX ADMIN — NITROGLYCERIN 1 INCH: 20 OINTMENT TOPICAL at 09:43

## 2017-09-13 RX ADMIN — PERFLUTREN 3 ML: 6.52 INJECTION, SUSPENSION INTRAVENOUS at 14:48

## 2017-09-13 RX ADMIN — ASPIRIN 325 MG: 325 TABLET ORAL at 08:45

## 2017-09-13 RX ADMIN — NITROGLYCERIN 0.4 MG: 0.4 TABLET SUBLINGUAL at 09:14

## 2017-09-13 RX ADMIN — NITROGLYCERIN 0.4 MG: 0.4 TABLET SUBLINGUAL at 09:07

## 2017-09-13 NOTE — PROGRESS NOTES
Date of Office Visit:  Encounter Provider: SACHI Marte  [facility] CARDIOLOGY  Cardiac Evaluation Center  Patient Name: Namrata Luz  :1955    Chief Complaint   Patient presents with   • Chest Pain   :     HPI: Namrata Luz is a 61 y.o. female who presents today for evaluation of chest pain. She is a new patient to me. Her previous records have been reviewed. She has a history of asthma, anxiety, depression, hypothyroidism, fatty liver, hypertension, hyperlipidemia, diabetes, renal insufficiency, and mitral valve prolapse. She says that she diagnosed with mitral valve prolapse in the past and treated with beta blocker therapy. She does not remember her last follow up with the cardiologist or an echocardiogram. She does report a family history of her brother recently being diagnosed with coronary artery disease. She states that she has been under an increased amount of stress. She states that her blood pressure is well-controlled.    Today she woke up around 5:30 a.m. She noticed that she was experiencing pain underneath her left breast and would rate this between an average of 4 out of 10 and at its worse at 9 out of 10. She has had intermittent episodes of nausea, clamminess, diaphoresis associated with the pain. She says that it has been intermittent and the pain can occur with rest and does not worsen with exertion. She does report a headache and states that she has not been feeling well over the past couple of days. She became concerned about her symptoms and presented to [facility] for further evaluation.    Upon review of the ED records, her blood pressure was 143/81 and heart rate 67. Her initial troponin level was negative. She had a comprehensive metabolic panel, which revealed a creatinine level of 1.01, glucose 134, ALT 56 and AST 52. The rest of the panel was normal. CBC revealed an abnormal hemoglobin of 16.2,  hematocrit 48.3, RBC 5.32, all high. Urinalysis was normal. EKG was interpreted as normal sinus rhythm with nonspecific ST-T abnormalities and chest x-ray was normal. The plan of care was discussed with Dr. Mingo Sampson and he recommended evaluation in our cardiac evaluation center.     She still continues to experience some discomfort. She does note that her left upper quadrant is tender to touch. She reports occasional headache and occasional pounding sensation of her heartbeat. She denies shortness of air, paroxysmal nocturnal dyspnea, orthopnea, cough, wheezing, edema, dizziness or syncope.      Past Medical History:   Diagnosis Date   • Abnormal LFTs    • Allergic rhinitis    • Anxiety    • Arthritis    • Asthma    • Depression    • Disease of thyroid gland    • Endometriosis    • Fatty liver    • Hyperlipidemia    • Hypertension    • Lung nodule    • Mitral valve prolapse    • Prediabetes    • Psoriasis    • Renal insufficiency    • Tachycardia    • Vitamin D deficiency        Past Surgical History:   Procedure Laterality Date   • BACK SURGERY     • CATARACT EXTRACTION, BILATERAL     • CHOLECYSTECTOMY     • HYSTERECTOMY     • TIBIA FASCIOTOMY     • TUBAL ABDOMINAL LIGATION         Social History     Social History   • Marital status:      Spouse name: N/A   • Number of children: N/A   • Years of education: N/A     Occupational History   • Not on file.     Social History Main Topics   • Smoking status: Never Smoker   • Smokeless tobacco: Not on file   • Alcohol use Yes      Comment: 1-2 a month   • Drug use: No   • Sexual activity: Defer     Other Topics Concern   • Not on file     Social History Narrative       Family History   Problem Relation Age of Onset   • Vaginal cancer Mother    • Lung cancer Mother    • Arthritis Mother    • Diabetes Mother    • Coronary artery disease Father    • Arthritis Father    • Kidney cancer Father    • Coronary artery disease Brother    • Asthma Sister    •  Osteoporosis Sister    • Osteoporosis Maternal Grandmother        Review of Systems   Constitution: Positive for diaphoresis. Negative for chills, fever, malaise/fatigue, night sweats, weight gain and weight loss.   HENT: Positive for headaches. Negative for hearing loss, nosebleeds, sore throat and tinnitus.    Eyes: Negative for blurred vision, double vision, pain and visual disturbance.   Cardiovascular: Positive for chest pain. Negative for claudication, cyanosis, dyspnea on exertion, irregular heartbeat, leg swelling, near-syncope, orthopnea, palpitations, paroxysmal nocturnal dyspnea and syncope.   Respiratory: Negative for cough, hemoptysis, shortness of breath, snoring and wheezing.    Endocrine: Negative for cold intolerance, heat intolerance and polyuria.   Hematologic/Lymphatic: Negative for bleeding problem. Does not bruise/bleed easily.   Skin: Negative for color change, dry skin, flushing and itching.   Musculoskeletal: Positive for joint pain. Negative for falls, joint swelling, muscle cramps, muscle weakness and myalgias.   Gastrointestinal: Negative for abdominal pain, constipation, heartburn, melena, nausea and vomiting.   Genitourinary: Negative for dysuria and hematuria.   Neurological: Negative for excessive daytime sleepiness, dizziness, light-headedness, loss of balance, numbness, paresthesias, seizures and vertigo.   Psychiatric/Behavioral: Positive for depression. Negative for altered mental status, memory loss and substance abuse. The patient does not have insomnia and is not nervous/anxious.    Allergic/Immunologic: Negative for environmental allergies.       Allergies   Allergen Reactions   • Morphine And Related    • Penicillins          Current Outpatient Prescriptions:   •  ACCU-CHEK KARMA PLUS test strip, Use as instructed to test blood sugar daily., Disp: 100 each, Rfl: 3  •  ACCU-CHEK SOFTCLIX LANCETS lancets, Use as instructed to test blood sugar daily., Disp: 100 each, Rfl: 3  •   "albuterol (PROVENTIL HFA;VENTOLIN HFA) 108 (90 BASE) MCG/ACT inhaler, Proventil  (90 Base) MCG/ACT Inhalation Aerosol Solution; Patient Sig: Proventil  (90 Base) MCG/ACT Inhalation Aerosol Solution INHALE 1 TO 2 PUFFS EVERY 4 TO 6 HOURS AS NEEDED.; 1; 5; 07-Apr-2014; Active, Disp: , Rfl:   •  atorvastatin (LIPITOR) 40 MG tablet, TAKE 1 TABLET DAILY AS DIRECTED, Disp: 90 tablet, Rfl: 2  •  budesonide-formoterol (SYMBICORT) 80-4.5 MCG/ACT inhaler, Inhale 2 puffs 2 (two) times a day., Disp: 1 inhaler, Rfl: 2  •  ciprofloxacin-dexamethasone (CIPRODEX) 0.3-0.1 % otic suspension, Administer 4 drops to the right ear 2 (Two) Times a Day., Disp: 7.5 mL, Rfl: 0  •  clobetasol (TEMOVATE) 0.05 % external solution, Apply  topically 2 (Two) Times a Day., Disp: 50 mL, Rfl: 2  •  colestipol (COLESTID) 1 G tablet, Take 1 tablet by mouth Daily. Take 1 to 2 tablets by mouth daily as needed., Disp: 45 tablet, Rfl: 3  •  Empagliflozin (JARDIANCE) 25 MG tablet, Take 25 mg by mouth Daily., Disp: 30 tablet, Rfl: 6  •  fenofibrate (TRICOR) 145 MG tablet, TAKE 1 TABLET DAILY, Disp: 90 tablet, Rfl: 2  •  fluocinolone (SYNALAR) 0.01 % external solution, Apply  topically 2 (Two) Times a Day., Disp: 60 mL, Rfl: 2  •  FLUoxetine (PROzac) 10 MG capsule, Take 1 capsule by mouth Daily., Disp: 90 capsule, Rfl: 3  •  levothyroxine (SYNTHROID, LEVOTHROID) 88 MCG tablet, Take 1 tablet by mouth Daily., Disp: 90 tablet, Rfl: 3  •  metFORMIN (GLUCOPHAGE) 1000 MG tablet, TAKE 1 TABLET TWICE A DAY, Disp: 180 tablet, Rfl: 3  •  metoprolol succinate XL (TOPROL-XL) 100 MG 24 hr tablet, Take 1 tablet by mouth Daily., Disp: 90 tablet, Rfl: 4  No current facility-administered medications for this encounter.      Objective:     Vitals:    09/13/17 1210   BP: 95/66   BP Location: Left arm   Patient Position: Sitting   Pulse: 69   Resp: 20   SpO2: 94%   Weight: 206 lb (93.4 kg)   Height: 68\" (172.7 cm)     Body mass index is 31.32 kg/(m^2).    PHYSICAL " EXAM:    Vitals Reviewed.   General Appearance: No acute distress, well developed and well nourished.   Eyes: Conjunctiva and lids: No erythema, swelling, or discharge. Sclera non-icteric.   HENT: Atraumatic, normocephalic. External eyes, ears, and nose normal. No hearing loss noted. Mucous membranes normal. Lips not cyanotic. Neck supple with no tenderness.  Respiratory: No signs of respiratory distress. Respiration rhythm and depth normal.   Clear to auscultation. No rales, crackles, rhonchi, or wheezing auscultated.   Cardiovascular:  Jugular Venous Pressure: Normal  Heart Rate and Rhythm: Normal, Heart Sounds: Normal S1 and S2. No S3 or S4 noted.  Murmurs: No murmurs noted. No rubs, thrills, or gallops.   Arterial Pulses: Carotid pulses normal. No carotid bruit noted. Posterior tibialis and dorsalis pedis pulses normal.   Lower Extremities: No edema noted.  Gastrointestinal:  Abdomen soft, non-distended, non-tender. Normal bowel sounds. No hepatomegaly.   Musculoskeletal: Normal movement of extremities.  Reproducible tenderness in left upper abdominal quadrant.  Skin and Nails: General appearance normal. No pallor, cyanosis, diaphoresis. Skin temperature normal. No clubbing of fingernails.   Psychiatric: Patient alert and oriented to person, place, and time. Speech and behavior appropriate. Normal mood and affect.       ECG 12 Lead  Date/Time: 9/13/2017 12:17 PM  Performed by: SANGEETHA MOORE  Authorized by: SANGEETHA MOORE   Comparison: compared with previous ECG from 9/13/2017  Similar to previous ECG  Rhythm: sinus rhythm  Rate: normal  BPM: 68  Conduction: conduction normal  ST Segments: ST segments normal  T Waves: T waves normal  QRS axis: normal  Other: no other findings  Clinical impression: normal ECG              Hospital Blood Work:     Troponin normal.       Results from last 7 days  Lab Units 09/13/17  0841   SODIUM mmol/L 142   POTASSIUM mmol/L 4.4   CHLORIDE mmol/L 101   CO2 mmol/L 27.7   BUN  mg/dL 20   CREATININE mg/dL 1.01*   CALCIUM mg/dL 9.6   BILIRUBIN mg/dL 0.6   ALK PHOS U/L 76   ALT (SGPT) U/L 56*   AST (SGOT) U/L 52*   GLUCOSE mg/dL 134*       Results from last 7 days  Lab Units 09/13/17  0841   WBC 10*3/mm3 9.33   HEMOGLOBIN g/dL 16.2*   HEMATOCRIT % 48.3*   PLATELETS 10*3/mm3 320             Assessment:       Diagnosis Plan   1. Chest pain, unspecified type  Cardiac Monitoring    Vital Signs - Once    Vital Signs - As Needed    Pulse Oximetry    Oxygen Therapy- Nasal Cannula; Titrate for SPO2: 92%, equal to or greater than    Insert Peripheral IV    NPO Diet    Bathroom Privileges With Assistance    ECG 12 Lead    Troponin T    POC Glucose Fingerstick    Cardiac Monitoring    Vital Signs - Once    Insert Peripheral IV    NPO Diet    Bathroom Privileges With Assistance    Troponin T    Troponin T   2. Mitral valve prolapse  Cardiac Monitoring    Vital Signs - Once    Vital Signs - As Needed    Pulse Oximetry    Oxygen Therapy- Nasal Cannula; Titrate for SPO2: 92%, equal to or greater than    Insert Peripheral IV    NPO Diet    Bathroom Privileges With Assistance    ECG 12 Lead    Troponin T    POC Glucose Fingerstick    Cardiac Monitoring    Vital Signs - Once    Insert Peripheral IV    NPO Diet    Bathroom Privileges With Assistance    Troponin T    Troponin T    Adult Stress Echo With Color, Doppler, & Contrast   3. Essential hypertension  Cardiac Monitoring    Vital Signs - Once    Vital Signs - As Needed    Pulse Oximetry    Oxygen Therapy- Nasal Cannula; Titrate for SPO2: 92%, equal to or greater than    Insert Peripheral IV    NPO Diet    Bathroom Privileges With Assistance    ECG 12 Lead    Troponin T    POC Glucose Fingerstick    Cardiac Monitoring    Vital Signs - Once    Insert Peripheral IV    NPO Diet    Bathroom Privileges With Assistance    Troponin T    Troponin T   4. Mixed hyperlipidemia     5. Type 2 diabetes mellitus without complication, without long-term current use of  insulin     6. Abnormal laboratory test            Plan:       1. Chest pain. This patient presents today with new onset of chest pain that started this morning. Her pain has not worsened with exertion, nor resolved with rest. She reports that underneath her left breast in this area is tender to touch. I did not see any potential outbreak of shingles, although it is in that dermatome. Her EKG tracing was repeated and shows normal sinus rhythm. She had 2 troponin levels completed today, which were negative. Her pretest probability of coronary artery disease is between 4% to 10%. I have recommended a stress echocardiogram to be completed for further evaluation. The stress echocardiogram is normal and the resting echocardiogram revealed an ejection fraction of 61%. No wall motion abnormality and grade I diastolic dysfunction. I have recommended follow up with her PCP.  2. Mitral valve prolapse. The mitral valve was evaluated on the recent echocardiogram and was normal in structure. She said that she has been treated for mitral valve prolapse for many years and is on beta blocker therapy. She is going to further discuss with Dr. Sifuentes possibly stopping the beta blocker. I did explain to her that is not a medication that should be stopped abruptly and she will need further instruction from Dr. Sifuentes.  3. Hypertension. She denies any history of hypertension. Her blood pressure will be followed by her PCP.  4. Hyperlipidemia. She will follow up with her PCP. She is on statin.   5. Type 2 diabetes. This is managed by her primary care physician.  6. Abnormal lab test. In the emergency department she had some abnormalities noted on her BMP including her creatinine, liver enzymes, and glucose level. She also had abnormalities on her CBC. I will defer this to her PCP    Patient was seen and dictated independently by SACHI Baron.  As always, it has been a pleasure to participate in your patient's  care.      Sincerely,         SACHI Baron

## 2017-09-13 NOTE — ED NOTES
Patient to be sent to Dr. Sampson's office to have a stress test. Patient to be transported via hospital transport. IV to remain in place for stress test.      Francine Wise RN  09/13/17 [id number]

## 2017-09-13 NOTE — ED TRIAGE NOTES
"Pt complains of not feeling well since the weekend. Woke up at 5am with chest pain, diaphoretic \"just feel bad\"  Pt states pain is center of chest along with RLQ abd pain.  Currently pain is \"better\" than it was at 5am.    "

## 2017-09-13 NOTE — ED PROVIDER NOTES
" EMERGENCY DEPARTMENT ENCOUNTER    CHIEF COMPLAINT  Chief Complaint: chest pain  History given by: Pt  History limited by: N/A  Room Number: 03/03  PMD: Aparna Sifuentes MD      HPI:  Pt is a 61 y.o. female who presents complaining of intermittent chest pain that started since she woke up this morning PTA. She also c/o of nausea, diaphoresis, SOB, subjective fever, and body aches. She states she has had a bad headache for the last 2 days, and has been taking tylenol with mild relief. She denies any history of of similar symptoms, chills, cough, and trouble urinating. Pt has a Hx of heart disease from her father and grandfather, brother just had a Hr attack six months ago.    Duration:  PTA  Onset: gradual  Timing: intermittent  Location: chest  Radiation: none  Quality: \"pain\"  Intensity/Severity: moderate  Progression: unchanged  Associated Symptoms: nausea, diaphoresis, SOB, fever, body aches, headache   Aggravating Factors: none  Alleviating Factors: none  Previous Episodes: Pt has a Hx of heart disease from her father and grandfather, brother just had a Hr attack six months ago.  Treatment before arrival: Pt has been taking tylenol with mild relief.     PAST MEDICAL HISTORY  Active Ambulatory Problems     Diagnosis Date Noted   • Abnormal liver function tests 04/18/2016   • Anxiety 04/18/2016   • Asthma 04/18/2016   • Mixed anxiety depressive disorder 04/18/2016   • Diarrhea 04/18/2016   • Headache 04/18/2016   • Hyperlipidemia 04/18/2016   • Hypertension 04/18/2016   • Hypothyroidism 04/18/2016   • Lung mass 04/18/2016   • Neck pain 04/18/2016   • Psoriasis 04/18/2016   • Vitamin D deficiency 04/18/2016   • Right medial knee pain 09/22/2016   • Effusion of right knee 09/22/2016   • Choking 12/06/2016   • Type 2 diabetes mellitus without complication 12/06/2016   • Hoarseness 12/06/2016     Resolved Ambulatory Problems     Diagnosis Date Noted   • Leukocytosis 04/18/2016   • Prediabetes 04/18/2016     Past " Medical History:   Diagnosis Date   • Abnormal LFTs    • Allergic rhinitis    • Anxiety    • Arthritis    • Asthma    • Depression    • Disease of thyroid gland    • Endometriosis    • Fatty liver    • Hyperlipidemia    • Hypertension    • Lung nodule    • Mitral valve prolapse    • Prediabetes    • Psoriasis    • Renal insufficiency    • Tachycardia    • Vitamin D deficiency        PAST SURGICAL HISTORY  Past Surgical History:   Procedure Laterality Date   • BACK SURGERY     • CATARACT EXTRACTION, BILATERAL     • CHOLECYSTECTOMY     • HYSTERECTOMY     • TIBIA FASCIOTOMY     • TUBAL ABDOMINAL LIGATION         FAMILY HISTORY  Family History   Problem Relation Age of Onset   • Vaginal cancer Mother    • Lung cancer Mother    • Arthritis Mother    • Diabetes Mother    • Coronary artery disease Father    • Arthritis Father    • Kidney cancer Father    • Coronary artery disease Brother    • Asthma Sister    • Osteoporosis Sister    • Osteoporosis Maternal Grandmother        SOCIAL HISTORY  Social History     Social History   • Marital status:      Spouse name: N/A   • Number of children: N/A   • Years of education: N/A     Occupational History   • Not on file.     Social History Main Topics   • Smoking status: Never Smoker   • Smokeless tobacco: Not on file   • Alcohol use Yes      Comment: 1-2 a month   • Drug use: No   • Sexual activity: Defer     Other Topics Concern   • Not on file     Social History Narrative       ALLERGIES  Morphine and related and Penicillins    REVIEW OF SYSTEMS  Review of Systems   Constitutional: Positive for diaphoresis and fever (subjective).   HENT: Negative for sore throat.    Eyes: Negative.    Respiratory: Positive for shortness of breath. Negative for cough.    Cardiovascular: Positive for chest pain.   Gastrointestinal: Positive for nausea. Negative for abdominal pain, diarrhea and vomiting.   Genitourinary: Negative for dysuria.   Musculoskeletal: Positive for arthralgias  (bodyaches  ). Negative for neck pain.   Skin: Negative for rash.   Allergic/Immunologic: Negative.    Neurological: Positive for headaches. Negative for weakness and numbness.   Hematological: Negative.    Psychiatric/Behavioral: Negative.    All other systems reviewed and are negative.      PHYSICAL EXAM  ED Triage Vitals   Temp Heart Rate Resp BP SpO2   -- 09/13/17 0819 09/13/17 0819 09/13/17 0819 --    67 18 143/81       Temp src Heart Rate Source Patient Position BP Location FiO2 (%)   -- -- -- -- --              Physical Exam   Constitutional: She is oriented to person, place, and time and well-developed, well-nourished, and in no distress. No distress.   HENT:   Head: Normocephalic and atraumatic.   Eyes: EOM are normal. Pupils are equal, round, and reactive to light.   Neck: Normal range of motion. Neck supple.   Cardiovascular: Normal rate, regular rhythm and normal heart sounds.    Pulmonary/Chest: Effort normal and breath sounds normal. No respiratory distress. She exhibits no tenderness.   Abdominal: Soft. There is no tenderness. There is no rebound and no guarding.   Musculoskeletal: Normal range of motion. She exhibits no edema.   Neurological: She is alert and oriented to person, place, and time. She has normal sensation and normal strength.   Skin: Skin is warm and dry. No rash noted.   Psychiatric: Mood and affect normal.   Nursing note and vitals reviewed.      LAB RESULTS  Lab Results (last 24 hours)     Procedure Component Value Units Date/Time    Lipase [[id number]]  (Normal) Collected:  09/13/17 0841    Specimen:  Blood Updated:  09/13/17 0916     Lipase 50 U/L     CBC & Differential [[id number]] Collected:  09/13/17 0841    Specimen:  Blood Updated:  09/13/17 0855    Narrative:       The following orders were created for panel order CBC & Differential.  Procedure                               Abnormality         Status                     ---------                               -----------          ------                     CBC Auto Differential[[id number]]        Abnormal            Final result                 Please view results for these tests on the individual orders.    Comprehensive Metabolic Panel [[id number]]  (Abnormal) Collected:  09/13/17 0841    Specimen:  Blood Updated:  09/13/17 0916     Glucose 134 (H) mg/dL      BUN 20 mg/dL      Creatinine 1.01 (H) mg/dL      Sodium 142 mmol/L      Potassium 4.4 mmol/L      Chloride 101 mmol/L      CO2 27.7 mmol/L      Calcium 9.6 mg/dL      Total Protein 7.7 g/dL      Albumin 4.50 g/dL      ALT (SGPT) 56 (H) U/L      AST (SGOT) 52 (H) U/L      Alkaline Phosphatase 76 U/L      Total Bilirubin 0.6 mg/dL      eGFR Non African Amer 56 (L) mL/min/1.73      Globulin 3.2 gm/dL      A/G Ratio 1.4 g/dL      BUN/Creatinine Ratio 19.8     Anion Gap 13.3 mmol/L     Troponin [[id number]]  (Normal) Collected:  09/13/17 0841    Specimen:  Blood Updated:  09/13/17 0916     Troponin T <0.010 ng/mL     Narrative:       Troponin T Reference Ranges:  Less than 0.03 ng/mL:    Negative for AMI  0.03 to 0.09 ng/mL:      Indeterminant for AMI  Greater than 0.09 ng/mL: Positive for AMI    Protime-INR [[id number]]  (Normal) Collected:  09/13/17 0841    Specimen:  Blood Updated:  09/13/17 0902     Protime 12.2 Seconds      INR 0.94    CBC Auto Differential [[id number]]  (Abnormal) Collected:  09/13/17 0841    Specimen:  Blood Updated:  09/13/17 0855     WBC 9.33 10*3/mm3      RBC 5.32 (H) 10*6/mm3      Hemoglobin 16.2 (H) g/dL      Hematocrit 48.3 (H) %      MCV 90.8 fL      MCH 30.5 pg      MCHC 33.5 g/dL      RDW 14.3 (H) %      RDW-SD 47.6 fl      MPV 9.9 fL      Platelets 320 10*3/mm3      Neutrophil % 72.0 %      Lymphocyte % 16.1 (L) %      Monocyte % 5.5 %      Eosinophil % 5.5 %      Basophil % 0.4 %      Immature Grans % 0.5 %      Neutrophils, Absolute 6.72 10*3/mm3      Lymphocytes, Absolute 1.50 10*3/mm3      Monocytes, Absolute 0.51 10*3/mm3      Eosinophils, Absolute 0.51  10*3/mm3      Basophils, Absolute 0.04 10*3/mm3      Immature Grans, Absolute 0.05 (H) 10*3/mm3     Urinalysis With / Culture If Indicated [[id number]]  (Abnormal) Collected:  09/13/17 0941    Specimen:  Urine from Urine, Clean Catch Updated:  09/13/17 1006     Color, UA Yellow     Appearance, UA Clear     pH, UA 5.5     Specific Gravity, UA >=1.030     Glucose, UA >=1000 mg/dL (3+) (A)     Ketones, UA Negative     Bilirubin, UA Negative     Blood, UA Negative     Protein, UA Negative     Leuk Esterase, UA Negative     Nitrite, UA Negative     Urobilinogen, UA 0.2 E.U./dL    Narrative:       Urine microscopic not indicated.          I ordered the above labs and reviewed the results    RADIOLOGY  XR Chest 2 View   Final Result   The lungs are well-expanded and appear free of infiltrates. There are no  pleural effusions. The cardiomediastinal silhouette is unremarkable.     IMPRESSIONS: No evidence of active disease within the chest.     This report was finalized on 9/13/2017 9:10 AM by Dr. Khai Brown MD.        I ordered the above noted radiological studies. Interpreted by radiologist. Reviewed by me in PACS.       PROCEDURES  Procedures  EKG           EKG time: 0815  Rhythm/Rate: NSR, 65 BPM  P waves and [place]: normal  QRS, axis: normal   ST and T waves: nonspecific ST/T changes     Interpreted Contemporaneously by me, independently viewed  unchanged compared to prior 10/14/09      PROGRESS AND CONSULTS  ED Course     0833  Ordered blood work including CBC, Troponin, CMP, Lipase and UA, and EKG and XR chest for further evaluation. Ordered IVF for hydration, ASA, and nitrostat for pain.   0918  Ordered Nitrostat for pain.  1115   Rechecked pt who is resting comfortably. I informed pt her XR chest showed nothing acute and lab work was good, and plan for admission to do a stress test. Pt understands and agrees with plan. All questions addressed.  1116  Placed consult to cardiology.   1128   Discussed pt with   (cardiologist), who wants pt to be transported the office for a stress test.     MEDICAL DECISION MAKING  Results were reviewed/discussed with the patient and they were also made aware of online access. Pt also made aware that some labs, such as cultures, will not be resulted during ER visit and follow up with PMD is necessary.     MDM  Number of Diagnoses or Management Options     Amount and/or Complexity of Data Reviewed  Clinical lab tests: ordered and reviewed (Unremarkable labs. )  Tests in the radiology section of CPT®: ordered and reviewed (XR chest - showed nothing acute. )  Tests in the medicine section of CPT®: ordered and reviewed (See note. )  Decide to obtain previous medical records or to obtain history from someone other than the patient: yes  Discuss the patient with other providers: yes ( (cardiologist))  Independent visualization of images, tracings, or specimens: yes    Patient Progress  Patient progress: stable         DIAGNOSIS  Final diagnoses:   Acute chest pain       DISPOSITION  DISCHARGE    Patient discharged to cardiologist office for evaluation, and is in stable condition.    Reviewed implications of results, diagnosis, meds, responsibility to follow up, warning signs and symptoms of possible worsening, potential complications and reasons to return to ER.    Patient/Family voiced understanding of above instructions.    Discussed plan for discharge, as there is no emergent indication for admission.  Pt/family is agreeable and understands need for follow up and repeat testing.  Pt is aware that discharge does not mean that nothing is wrong but it indicates no emergency is present that requires admission and they must continue care with follow-up as given below or physician of their choice.     FOLLOW-UP        Go to Dr. Sampson's office now         Medication List      Notice     No changes were made to your prescriptions during this visit.                 Latest  Documented Vital Signs:  As of 11:32 AM  BP- 109/63 HR- 76 Temp- 98.3 °F (36.8 °C) (Oral) O2 sat- 92%    --  Documentation assistance provided by [facility] for Dr. Victorino Tilley MD.  Information recorded by the scribe was done at my direction and has been verified and validated by me.     Zoraida Sanchez  09/13/17 1150       Victorino Tilley MD  09/13/17 5711

## 2017-09-14 ENCOUNTER — TELEPHONE (OUTPATIENT)
Dept: INTERNAL MEDICINE | Facility: CLINIC | Age: 62
End: 2017-09-14

## 2017-09-14 NOTE — TELEPHONE ENCOUNTER
Pt informed, states understanding.   Will call tomorrow if she feels she may have a shingles outbreak and needs to be seen by a physician, but if not she will wait until Monday and if the pain is not gone she will call for an OV with Bear.

## 2017-09-20 ENCOUNTER — TELEPHONE (OUTPATIENT)
Dept: CARDIOLOGY | Facility: HOSPITAL | Age: 62
End: 2017-09-20

## 2017-10-03 DIAGNOSIS — E03.9 ACQUIRED HYPOTHYROIDISM: ICD-10-CM

## 2017-10-03 RX ORDER — LEVOTHYROXINE SODIUM 88 UG/1
TABLET ORAL
Qty: 90 TABLET | Refills: 3 | Status: SHIPPED | OUTPATIENT
Start: 2017-10-03 | End: 2018-05-21

## 2017-11-27 DIAGNOSIS — E03.9 ACQUIRED HYPOTHYROIDISM: ICD-10-CM

## 2017-11-27 DIAGNOSIS — I10 ESSENTIAL HYPERTENSION: ICD-10-CM

## 2017-11-27 DIAGNOSIS — Z00.00 HEALTH CARE MAINTENANCE: Primary | ICD-10-CM

## 2017-11-27 DIAGNOSIS — E55.9 VITAMIN D DEFICIENCY: ICD-10-CM

## 2017-11-27 DIAGNOSIS — E78.2 MIXED HYPERLIPIDEMIA: ICD-10-CM

## 2017-11-27 DIAGNOSIS — E11.9 TYPE 2 DIABETES MELLITUS WITHOUT COMPLICATION, WITHOUT LONG-TERM CURRENT USE OF INSULIN (HCC): ICD-10-CM

## 2017-12-11 LAB
25(OH)D3+25(OH)D2 SERPL-MCNC: 22.6 NG/ML (ref 30–100)
ALBUMIN SERPL-MCNC: 4.6 G/DL (ref 3.6–4.8)
ALBUMIN/CREAT UR: <2.9 MG/G CREAT (ref 0–30)
ALBUMIN/GLOB SERPL: 1.7 {RATIO} (ref 1.2–2.2)
ALP SERPL-CCNC: 75 IU/L (ref 39–117)
ALT SERPL-CCNC: 51 IU/L (ref 0–32)
APPEARANCE UR: CLEAR
AST SERPL-CCNC: 45 IU/L (ref 0–40)
BACTERIA #/AREA URNS HPF: NORMAL /[HPF]
BASOPHILS # BLD AUTO: 0.1 X10E3/UL (ref 0–0.2)
BASOPHILS NFR BLD AUTO: 1 %
BILIRUB SERPL-MCNC: 0.6 MG/DL (ref 0–1.2)
BILIRUB UR QL STRIP: NEGATIVE
BUN SERPL-MCNC: 17 MG/DL (ref 8–27)
BUN/CREAT SERPL: 18 (ref 12–28)
CALCIUM SERPL-MCNC: 9.6 MG/DL (ref 8.7–10.3)
CHLORIDE SERPL-SCNC: 100 MMOL/L (ref 96–106)
CHOLEST SERPL-MCNC: 132 MG/DL (ref 100–199)
CO2 SERPL-SCNC: 26 MMOL/L (ref 18–29)
COLOR UR: YELLOW
CREAT SERPL-MCNC: 0.92 MG/DL (ref 0.57–1)
CREAT UR-MCNC: 101.9 MG/DL
EOSINOPHIL # BLD AUTO: 0.7 X10E3/UL (ref 0–0.4)
EOSINOPHIL NFR BLD AUTO: 11 %
EPI CELLS #/AREA URNS HPF: NORMAL /HPF
ERYTHROCYTE [DISTWIDTH] IN BLOOD BY AUTOMATED COUNT: 14.7 % (ref 12.3–15.4)
GFR SERPLBLD CREATININE-BSD FMLA CKD-EPI: 67 ML/MIN/1.73
GFR SERPLBLD CREATININE-BSD FMLA CKD-EPI: 77 ML/MIN/1.73
GLOBULIN SER CALC-MCNC: 2.7 G/DL (ref 1.5–4.5)
GLUCOSE SERPL-MCNC: 107 MG/DL (ref 65–99)
GLUCOSE UR QL: ABNORMAL
HBA1C MFR BLD: 6.4 % (ref 4.8–5.6)
HCT VFR BLD AUTO: 45.8 % (ref 34–46.6)
HDLC SERPL-MCNC: 40 MG/DL
HGB BLD-MCNC: 15 G/DL (ref 11.1–15.9)
HGB UR QL STRIP: NEGATIVE
IMM GRANULOCYTES # BLD: 0 X10E3/UL (ref 0–0.1)
IMM GRANULOCYTES NFR BLD: 0 %
KETONES UR QL STRIP: NEGATIVE
LDLC SERPL CALC-MCNC: 63 MG/DL (ref 0–99)
LEUKOCYTE ESTERASE UR QL STRIP: NEGATIVE
LYMPHOCYTES # BLD AUTO: 1.5 X10E3/UL (ref 0.7–3.1)
LYMPHOCYTES NFR BLD AUTO: 23 %
MCH RBC QN AUTO: 29.2 PG (ref 26.6–33)
MCHC RBC AUTO-ENTMCNC: 32.8 G/DL (ref 31.5–35.7)
MCV RBC AUTO: 89 FL (ref 79–97)
MICRO URNS: ABNORMAL
MICRO URNS: ABNORMAL
MICROALBUMIN UR-MCNC: <3 UG/ML
MONOCYTES # BLD AUTO: 0.4 X10E3/UL (ref 0.1–0.9)
MONOCYTES NFR BLD AUTO: 6 %
MUCOUS THREADS URNS QL MICRO: PRESENT
NEUTROPHILS # BLD AUTO: 3.9 X10E3/UL (ref 1.4–7)
NEUTROPHILS NFR BLD AUTO: 59 %
NITRITE UR QL STRIP: NEGATIVE
PH UR STRIP: 5 [PH] (ref 5–7.5)
PLATELET # BLD AUTO: 164 X10E3/UL (ref 150–379)
POTASSIUM SERPL-SCNC: 4.4 MMOL/L (ref 3.5–5.2)
PROT SERPL-MCNC: 7.3 G/DL (ref 6–8.5)
PROT UR QL STRIP: NEGATIVE
RBC # BLD AUTO: 5.13 X10E6/UL (ref 3.77–5.28)
RBC #/AREA URNS HPF: NORMAL /HPF
SODIUM SERPL-SCNC: 141 MMOL/L (ref 134–144)
SP GR UR: 1.03 (ref 1–1.03)
TRIGL SERPL-MCNC: 144 MG/DL (ref 0–149)
TSH SERPL DL<=0.005 MIU/L-ACNC: 3.61 UIU/ML (ref 0.45–4.5)
URINALYSIS REFLEX: ABNORMAL
UROBILINOGEN UR STRIP-MCNC: 0.2 MG/DL (ref 0.2–1)
VLDLC SERPL CALC-MCNC: 29 MG/DL (ref 5–40)
WBC # BLD AUTO: 6.7 X10E3/UL (ref 3.4–10.8)
WBC #/AREA URNS HPF: NORMAL /HPF

## 2017-12-12 ENCOUNTER — OFFICE VISIT (OUTPATIENT)
Dept: INTERNAL MEDICINE | Facility: CLINIC | Age: 62
End: 2017-12-12

## 2017-12-12 VITALS
WEIGHT: 206 LBS | RESPIRATION RATE: 18 BRPM | OXYGEN SATURATION: 99 % | HEART RATE: 72 BPM | DIASTOLIC BLOOD PRESSURE: 72 MMHG | SYSTOLIC BLOOD PRESSURE: 120 MMHG | HEIGHT: 68 IN | BODY MASS INDEX: 31.22 KG/M2

## 2017-12-12 DIAGNOSIS — E11.9 TYPE 2 DIABETES MELLITUS WITHOUT COMPLICATION, WITHOUT LONG-TERM CURRENT USE OF INSULIN (HCC): ICD-10-CM

## 2017-12-12 DIAGNOSIS — I10 ESSENTIAL HYPERTENSION: ICD-10-CM

## 2017-12-12 DIAGNOSIS — Z00.00 HEALTH MAINTENANCE EXAMINATION: Primary | ICD-10-CM

## 2017-12-12 DIAGNOSIS — E78.2 MIXED HYPERLIPIDEMIA: ICD-10-CM

## 2017-12-12 DIAGNOSIS — E03.9 ACQUIRED HYPOTHYROIDISM: ICD-10-CM

## 2017-12-12 DIAGNOSIS — F41.8 MIXED ANXIETY DEPRESSIVE DISORDER: ICD-10-CM

## 2017-12-12 DIAGNOSIS — E55.9 VITAMIN D DEFICIENCY: ICD-10-CM

## 2017-12-12 PROCEDURE — 99396 PREV VISIT EST AGE 40-64: CPT | Performed by: INTERNAL MEDICINE

## 2017-12-12 PROCEDURE — 90471 IMMUNIZATION ADMIN: CPT | Performed by: INTERNAL MEDICINE

## 2017-12-12 PROCEDURE — 90656 IIV3 VACC NO PRSV 0.5 ML IM: CPT | Performed by: INTERNAL MEDICINE

## 2017-12-12 NOTE — PROGRESS NOTES
Chief Complaint  Namrata Luz is a 62 y.o. female who presents for Annual Exam (CPE, spouse recently dx with lung cancer. Extremely stressed. )  .    History of Present Illness   Namrata is here for CPE.  Her partner was just dx with lung cancer.  He is a smoker.  She has three people she has to take care of now.  They are working on getting the 15 y/o troubled grandson of her partner into a group home.  Her 37 y/o son is moving back to [place] which is good for her.    Mammo: 2/20/17  Pap: n/a s/p hysterectomy  C-scope:2015  Flu shot - not yet    Exercise: She tries to stay active but she doesn't really get any cardio.      DM2 - she doesn't like jardiance because she feels like her numbers are higher than when she was on Farxiga.  We discussed that her A1c is well controlled.  Maybe Farxiga will be on her plan next year.      She went to the ER a few months ago thinking she was having a heart attack.  She had a work up.  Everything was inconclusive.  She has completely recovered.  Her stress test was all good. She was told years ago that she had MVP.  When they did the work up in the ER they said she did not have MVP.       Immunization History   Administered Date(s) Administered   • Flu Vaccine Quad PF >18YRS 09/22/2016, 12/12/2017   • Influenza, Quadrivalent 10/02/2013, 10/01/2014, 10/07/2015   • Pneumococcal Polysaccharide 04/17/2014   • Td 10/02/2005   • Tdap 04/17/2014   • Zoster 08/24/2012       Review of Systems   Constitution: Negative for chills, fever and malaise/fatigue.   HENT: Negative for hearing loss, hoarse voice and sore throat.    Eyes: Negative for blurred vision, double vision and visual disturbance.   Cardiovascular: Negative for chest pain, leg swelling and palpitations.   Respiratory: Negative for cough and shortness of breath.    Endocrine: Negative for polydipsia and polyuria.   Hematologic/Lymphatic: Does not bruise/bleed easily.   Skin: Negative for rash and suspicious lesions.    Musculoskeletal: Negative for arthritis and myalgias.   Gastrointestinal: Negative for abdominal pain, change in bowel habit, constipation, diarrhea, dysphagia, hematochezia, melena, nausea and vomiting.   Genitourinary: Negative for dysuria and hematuria.   Neurological: Negative for dizziness, headaches and light-headedness.   Psychiatric/Behavioral: Negative for depression. The patient is not nervous/anxious.        Patient Active Problem List   Diagnosis   • Abnormal liver function tests   • Anxiety   • Asthma   • Mixed anxiety depressive disorder   • Headache   • Hyperlipidemia   • Hypertension   • Hypothyroidism   • Lung mass   • Neck pain   • Psoriasis   • Vitamin D deficiency   • Right medial knee pain   • Effusion of right knee   • Choking   • Type 2 diabetes mellitus without complication   • Hoarseness       Past Medical History:   Diagnosis Date   • Abnormal LFTs    • Allergic rhinitis    • Anxiety    • Arthritis    • Asthma    • Depression    • Disease of thyroid gland    • Endometriosis    • Fatty liver    • Hyperlipidemia    • Hypertension    • Lung nodule    • Mitral valve prolapse    • Prediabetes    • Psoriasis    • Renal insufficiency    • Tachycardia    • Vitamin D deficiency        Past Surgical History:   Procedure Laterality Date   • BACK SURGERY     • CATARACT EXTRACTION, BILATERAL     • CHOLECYSTECTOMY     • HYSTERECTOMY     • TIBIA FASCIOTOMY     • TUBAL ABDOMINAL LIGATION         Family History   Problem Relation Age of Onset   • Vaginal cancer Mother    • Lung cancer Mother    • Arthritis Mother    • Diabetes Mother    • Coronary artery disease Father    • Arthritis Father    • Kidney cancer Father    • Coronary artery disease Brother    • Asthma Sister    • Osteoporosis Sister    • Osteoporosis Maternal Grandmother        Social History     Social History   • Marital status:      Spouse name: N/A   • Number of children: N/A   • Years of education: N/A     Occupational History    • Not on file.     Social History Main Topics   • Smoking status: Never Smoker   • Smokeless tobacco: Never Used   • Alcohol use Yes      Comment: 1-2 a month   • Drug use: No   • Sexual activity: Defer     Other Topics Concern   • Not on file     Social History Narrative       Current Outpatient Prescriptions on File Prior to Visit   Medication Sig Dispense Refill   • ACCU-CHEK KARMA PLUS test strip Use as instructed to test blood sugar daily. 100 each 3   • ACCU-CHEK SOFTCLIX LANCETS lancets Use as instructed to test blood sugar daily. 100 each 3   • albuterol (PROVENTIL HFA;VENTOLIN HFA) 108 (90 BASE) MCG/ACT inhaler Proventil  (90 Base) MCG/ACT Inhalation Aerosol Solution; Patient Sig: Proventil  (90 Base) MCG/ACT Inhalation Aerosol Solution INHALE 1 TO 2 PUFFS EVERY 4 TO 6 HOURS AS NEEDED.; 1; 5; 07-Apr-2014; Active     • atorvastatin (LIPITOR) 40 MG tablet TAKE 1 TABLET DAILY AS DIRECTED 90 tablet 2   • budesonide-formoterol (SYMBICORT) 80-4.5 MCG/ACT inhaler Inhale 2 puffs 2 (two) times a day. 1 inhaler 2   • clobetasol (TEMOVATE) 0.05 % external solution Apply  topically 2 (Two) Times a Day. 50 mL 2   • Empagliflozin (JARDIANCE) 25 MG tablet Take 25 mg by mouth Daily. 30 tablet 6   • fenofibrate (TRICOR) 145 MG tablet TAKE 1 TABLET DAILY 90 tablet 2   • fluocinolone (SYNALAR) 0.01 % external solution Apply  topically 2 (Two) Times a Day. 60 mL 2   • FLUoxetine (PROzac) 10 MG capsule Take 1 capsule by mouth Daily. 90 capsule 3   • levothyroxine (SYNTHROID, LEVOTHROID) 88 MCG tablet TAKE 1 TABLET DAILY 90 tablet 3   • metFORMIN (GLUCOPHAGE) 1000 MG tablet TAKE 1 TABLET TWICE A  tablet 3   • metoprolol succinate XL (TOPROL-XL) 100 MG 24 hr tablet Take 1 tablet by mouth Daily. 90 tablet 4   • [DISCONTINUED] colestipol (COLESTID) 1 G tablet Take 1 tablet by mouth Daily. Take 1 to 2 tablets by mouth daily as needed. 45 tablet 3   • [DISCONTINUED] ciprofloxacin-dexamethasone (CIPRODEX)  "0.3-0.1 % otic suspension Administer 4 drops to the right ear 2 (Two) Times a Day. 7.5 mL 0     No current facility-administered medications on file prior to visit.        Allergies   Allergen Reactions   • Morphine And Related    • Penicillins        Vitals:    12/12/17 0837   BP: 120/72   Pulse: 72   Resp: 18   SpO2: 99%   Weight: 93.4 kg (206 lb)   Height: 172.7 cm (67.99\")       Body mass index is 31.33 kg/(m^2).    Objective   Physical Exam   Constitutional: She is oriented to person, place, and time. She appears well-developed and well-nourished. No distress.   HENT:   Head: Normocephalic and atraumatic.   Nose: Nose normal.   Mouth/Throat: Oropharynx is clear and moist.   Eyes: Conjunctivae and EOM are normal. Pupils are equal, round, and reactive to light. No scleral icterus.   Neck: Normal range of motion. Neck supple. No thyromegaly present.   Cardiovascular: Normal rate, regular rhythm and normal heart sounds.  Exam reveals no gallop and no friction rub.    No murmur heard.  Pulses:       Carotid pulses are 2+ on the right side, and 2+ on the left side.       Femoral pulses are 2+ on the right side, and 2+ on the left side.       Dorsalis pedis pulses are 2+ on the right side, and 2+ on the left side.        Posterior tibial pulses are 2+ on the right side, and 2+ on the left side.   Pulmonary/Chest: Effort normal and breath sounds normal. No respiratory distress. She has no wheezes. She has no rales. Right breast exhibits no mass and no nipple discharge. Left breast exhibits no mass and no nipple discharge.   Abdominal: Soft. Bowel sounds are normal. She exhibits no distension and no mass. There is no tenderness.   Musculoskeletal: Normal range of motion. She exhibits no edema.    Namrata had a diabetic foot exam performed today.   During the foot exam she had a monofilament test performed (normal in all six areas mimi).    Vascular Status -  Her exam exhibits right foot vasculature normal. Her exam " exhibits no right foot edema. Her exam exhibits left foot vasculature normal. Her exam exhibits no left foot edema.   Skin Integrity  -  Her right foot skin is intact.     Namrata 's left foot skin is intact. .  Lymphadenopathy:     She has no cervical adenopathy.     She has no axillary adenopathy.        Right: No inguinal and no supraclavicular adenopathy present.        Left: No inguinal and no supraclavicular adenopathy present.   Neurological: She is alert and oriented to person, place, and time. She has normal reflexes. No cranial nerve deficit.   Skin: Skin is warm and dry.   Psychiatric: She has a normal mood and affect. Her speech is normal and behavior is normal. Judgment and thought content normal. Cognition and memory are normal.   Vitals reviewed.        Results for orders placed or performed in visit on 11/27/17   Comprehensive Metabolic Panel   Result Value Ref Range    Glucose 107 (H) 65 - 99 mg/dL    BUN 17 8 - 27 mg/dL    Creatinine 0.92 0.57 - 1.00 mg/dL    eGFR Non African Am 67 >59 mL/min/1.73    eGFR African Am 77 >59 mL/min/1.73    BUN/Creatinine Ratio 18 12 - 28    Sodium 141 134 - 144 mmol/L    Potassium 4.4 3.5 - 5.2 mmol/L    Chloride 100 96 - 106 mmol/L    Total CO2 26 18 - 29 mmol/L    Calcium 9.6 8.7 - 10.3 mg/dL    Total Protein 7.3 6.0 - 8.5 g/dL    Albumin 4.6 3.6 - 4.8 g/dL    Globulin 2.7 1.5 - 4.5 g/dL    A/G Ratio 1.7 1.2 - 2.2    Total Bilirubin 0.6 0.0 - 1.2 mg/dL    Alkaline Phosphatase 75 39 - 117 IU/L    AST (SGOT) 45 (H) 0 - 40 IU/L    ALT (SGPT) 51 (H) 0 - 32 IU/L   Lipid Panel   Result Value Ref Range    Total Cholesterol 132 100 - 199 mg/dL    Triglycerides 144 0 - 149 mg/dL    HDL Cholesterol 40 >39 mg/dL    VLDL Cholesterol 29 5 - 40 mg/dL    LDL Cholesterol  63 0 - 99 mg/dL   TSH Rfx On Abnormal To Free T4   Result Value Ref Range    TSH 3.610 0.450 - 4.500 uIU/mL   Urinalysis With / Culture If Indicated - Urine, Clean Catch   Result Value Ref Range    Specific  Gravity, UA 1.029 1.005 - 1.030    pH, UA 5.0 5.0 - 7.5    Color, UA Yellow Yellow    Appearance, UA Clear Clear    Leukocytes, UA Negative Negative    Protein Negative Negative/Trace    Glucose, UA 3+ (A) Negative    Ketones Negative Negative    Blood, UA Negative Negative    Bilirubin, UA Negative Negative    Urobilinogen, UA 0.2 0.2 - 1.0 mg/dL    Nitrite, UA Negative Negative    Microscopic Examination Comment     MICROSCOPIC EXAMINATION See below:     Urinalysis Reflex Comment    Vitamin D 25 Hydroxy   Result Value Ref Range    25 Hydroxy, Vitamin D 22.6 (L) 30.0 - 100.0 ng/mL   Hemoglobin A1c   Result Value Ref Range    Hemoglobin A1C 6.4 (H) 4.8 - 5.6 %   Microalbumin / Creatinine Urine Ratio - Urine, Clean Catch   Result Value Ref Range    Creatinine, Urine 101.9 Not Estab. mg/dL    Microalbumin, Urine <3.0 Not Estab. ug/mL    Microalbumin/Creatinine Ratio <2.9 0.0 - 30.0 mg/g creat   Microscopic Examination   Result Value Ref Range    WBC, UA 0-5 0 - 5 /hpf    RBC, UA None seen 0 - 2 /hpf    Epithelial Cells (non renal) None seen 0 - 10 /hpf    Mucus, UA Present Not Estab.    Bacteria, UA None seen None seen/Few   CBC & Differential   Result Value Ref Range    WBC 6.7 3.4 - 10.8 x10E3/uL    RBC 5.13 3.77 - 5.28 x10E6/uL    Hemoglobin 15.0 11.1 - 15.9 g/dL    Hematocrit 45.8 34.0 - 46.6 %    MCV 89 79 - 97 fL    MCH 29.2 26.6 - 33.0 pg    MCHC 32.8 31.5 - 35.7 g/dL    RDW 14.7 12.3 - 15.4 %    Platelets 164 150 - 379 x10E3/uL    Neutrophil Rel % 59 Not Estab. %    Lymphocyte Rel % 23 Not Estab. %    Monocyte Rel % 6 Not Estab. %    Eosinophil Rel % 11 Not Estab. %    Basophil Rel % 1 Not Estab. %    Neutrophils Absolute 3.9 1.4 - 7.0 x10E3/uL    Lymphocytes Absolute 1.5 0.7 - 3.1 x10E3/uL    Monocytes Absolute 0.4 0.1 - 0.9 x10E3/uL    Eosinophils Absolute 0.7 (H) 0.0 - 0.4 x10E3/uL    Basophils Absolute 0.1 0.0 - 0.2 x10E3/uL    Immature Granulocyte Rel % 0 Not Estab. %    Immature Grans Absolute 0.0 0.0 -  0.1 x10E3/uL       Assessment/Plan   Diagnoses and all orders for this visit:    Health maintenance examination  -     Flu Vaccine Quad PF >18YR    Mixed hyperlipidemia    Type 2 diabetes mellitus without complication, without long-term current use of insulin    Essential hypertension    Vitamin D deficiency    Acquired hypothyroidism    Mixed anxiety depressive disorder      Discussion/Summary  Namrata is here for her routine CPE.  She is up to date on all health maintenance.  Her DM is controlled.  Her BP is controlled.  Lipids are at goal.   She is going to restart OTC Vit D.  Her anx/depression are as controlled as they can be.  She has tremendous stress in her family life.  Hopefully, with her younger son moving back to [place] she will have some added help.  Encouraged to try to get exercise when she can.  I will see her back in 6 mo.        Return in about 6 months (around 6/12/2018) for Next scheduled follow up, with nonfasting labs prior.

## 2018-01-26 ENCOUNTER — CLINICAL SUPPORT (OUTPATIENT)
Dept: ORTHOPEDIC SURGERY | Facility: CLINIC | Age: 63
End: 2018-01-26

## 2018-01-26 VITALS — WEIGHT: 200 LBS | BODY MASS INDEX: 30.31 KG/M2 | TEMPERATURE: 97.8 F | HEIGHT: 68 IN

## 2018-01-26 DIAGNOSIS — M17.12 PRIMARY OSTEOARTHRITIS OF LEFT KNEE: Primary | ICD-10-CM

## 2018-01-26 PROCEDURE — 20610 DRAIN/INJ JOINT/BURSA W/O US: CPT | Performed by: NURSE PRACTITIONER

## 2018-01-26 PROCEDURE — 73562 X-RAY EXAM OF KNEE 3: CPT | Performed by: NURSE PRACTITIONER

## 2018-01-26 RX ORDER — METHYLPREDNISOLONE ACETATE 80 MG/ML
80 INJECTION, SUSPENSION INTRA-ARTICULAR; INTRALESIONAL; INTRAMUSCULAR; SOFT TISSUE
Status: COMPLETED | OUTPATIENT
Start: 2018-01-26 | End: 2018-01-26

## 2018-01-26 RX ORDER — BUPIVACAINE HYDROCHLORIDE 5 MG/ML
2 INJECTION, SOLUTION EPIDURAL; INTRACAUDAL
Status: COMPLETED | OUTPATIENT
Start: 2018-01-26 | End: 2018-01-26

## 2018-01-26 RX ADMIN — METHYLPREDNISOLONE ACETATE 80 MG: 80 INJECTION, SUSPENSION INTRA-ARTICULAR; INTRALESIONAL; INTRAMUSCULAR; SOFT TISSUE at 13:19

## 2018-01-26 RX ADMIN — BUPIVACAINE HYDROCHLORIDE 2 ML: 5 INJECTION, SOLUTION EPIDURAL; INTRACAUDAL at 13:19

## 2018-01-26 NOTE — PROGRESS NOTES
1/26/2018    Namrata Luz is here today for worsening knee pain. Pt has undergone injection of the knee in the past with good resolution of symptoms. Pt is requesting a repeat injection.     KNEE Injection Procedure Note:    Large Joint Arthrocentesis  Date/Time: 1/26/2018 1:19 PM  Consent given by: patient  Site marked: site marked  Timeout: Immediately prior to procedure a time out was called to verify the correct patient, procedure, equipment, support staff and site/side marked as required   Supporting Documentation  Indications: pain and joint swelling   Procedure Details  Location: knee - L knee  Preparation: Patient was prepped and draped in the usual sterile fashion  Needle size: 18 G  Approach: anterolateral  Medications administered: 2 mL bupivacaine (PF) 0.5 %; 80 mg methylPREDNISolone acetate 80 MG/ML  Patient tolerance: patient tolerated the procedure well with no immediate complications        Prior to injection risks were discussed including pain, infection, elevated blood sugar.  Patient verbalized understanding would like to proceed with injection  At the conclusion of the injection I discussed the importance of continued quad strengthening exercises on a daily basis. I will see the patient back if the symptoms should fail to improve or worsen.    Holly Smallwood APRN  1/26/2018    In addition 3 views of the left knee were ordered and reviewed today secondary to pain and show significant osteoarthritis.  Comparative views show definite progression in arthritis

## 2018-02-05 ENCOUNTER — TELEPHONE (OUTPATIENT)
Dept: CARDIOLOGY | Facility: HOSPITAL | Age: 63
End: 2018-02-05

## 2018-02-21 ENCOUNTER — OFFICE VISIT (OUTPATIENT)
Dept: INTERNAL MEDICINE | Facility: CLINIC | Age: 63
End: 2018-02-21

## 2018-02-21 VITALS
BODY MASS INDEX: 30.92 KG/M2 | HEART RATE: 76 BPM | RESPIRATION RATE: 18 BRPM | SYSTOLIC BLOOD PRESSURE: 126 MMHG | HEIGHT: 68 IN | TEMPERATURE: 98.3 F | DIASTOLIC BLOOD PRESSURE: 76 MMHG | OXYGEN SATURATION: 98 % | WEIGHT: 204 LBS

## 2018-02-21 DIAGNOSIS — J40 BRONCHITIS: ICD-10-CM

## 2018-02-21 DIAGNOSIS — R06.02 SHORTNESS OF BREATH: ICD-10-CM

## 2018-02-21 DIAGNOSIS — J01.10 ACUTE NON-RECURRENT FRONTAL SINUSITIS: Primary | ICD-10-CM

## 2018-02-21 DIAGNOSIS — R05.9 COUGH: ICD-10-CM

## 2018-02-21 LAB
HCT VFR BLDA CALC: 47.6 %
HGB BLDA-MCNC: 15.7 G/DL
LYMPHOCYTES # BLD: 20.9 %
MCH, POC: 28.7
MCHC, POC: 33
MCV, POC: 86.8
MONOCYTES # BLD: 7.2 %
PLATELET # BLD: 372 10*3/MM3
PMV BLD: 7.2 FL
POC NEUTROPHIL: 71.9 %
RBC, POC: 5.48
RDW, POC: 15.7
WBC # BLD: 7.7 10*3/UL

## 2018-02-21 PROCEDURE — 71046 X-RAY EXAM CHEST 2 VIEWS: CPT | Performed by: INTERNAL MEDICINE

## 2018-02-21 PROCEDURE — 85025 COMPLETE CBC W/AUTO DIFF WBC: CPT | Performed by: INTERNAL MEDICINE

## 2018-02-21 PROCEDURE — 99214 OFFICE O/P EST MOD 30 MIN: CPT | Performed by: INTERNAL MEDICINE

## 2018-02-21 RX ORDER — LEVOFLOXACIN 500 MG/1
500 TABLET, FILM COATED ORAL DAILY
Qty: 10 TABLET | Refills: 0 | Status: SHIPPED | OUTPATIENT
Start: 2018-02-21 | End: 2018-05-21

## 2018-02-21 NOTE — PROGRESS NOTES
Chief Complaint  Namrata Luz is a 62 y.o. female who presents for Cough (Producing dark green phlegm. Very thick. ); Headache; Earache; and Fever (Had a fever over the weekend, has been taking sinus medicine otc)  .    History of Present Illness   She has been twice to [facility] since Jan 3rd.  She has had a cough that won't go away.  She now has a headache and congestion.  She has had sweats all the time with this.  She had fever all weekend. She was treated with doxycycline in January.  She was given an inhaler as well.  In February, she was given cough med with codeine.  She was also treated with prednisone.  She is short of breath.  She is coughing up a lot of thick green sputum.    She has a hx of asthma.        Review of Systems   Constitution: Positive for chills, diaphoresis and fever.   HENT: Positive for congestion.    Respiratory: Positive for cough, shortness of breath and sputum production.        Patient Active Problem List   Diagnosis   • Abnormal liver function tests   • Anxiety   • Asthma   • Mixed anxiety depressive disorder   • Headache   • Hyperlipidemia   • Hypertension   • Hypothyroidism   • Lung mass   • Neck pain   • Psoriasis   • Vitamin D deficiency   • Right medial knee pain   • Effusion of right knee   • Choking   • Type 2 diabetes mellitus without complication   • Hoarseness       Past Medical History:   Diagnosis Date   • Abnormal LFTs    • Allergic rhinitis    • Anxiety    • Arthritis    • Asthma    • Depression    • Disease of thyroid gland    • Endometriosis    • Fatty liver    • Hyperlipidemia    • Hypertension    • Lung nodule    • Mitral valve prolapse    • Prediabetes    • Psoriasis    • Renal insufficiency    • Tachycardia    • Vitamin D deficiency        Past Surgical History:   Procedure Laterality Date   • BACK SURGERY     • CATARACT EXTRACTION, BILATERAL     • CHOLECYSTECTOMY     • HYSTERECTOMY     • TIBIA FASCIOTOMY     • TUBAL ABDOMINAL LIGATION         Family History    Problem Relation Age of Onset   • Vaginal cancer Mother    • Lung cancer Mother    • Arthritis Mother    • Diabetes Mother    • Coronary artery disease Father    • Arthritis Father    • Kidney cancer Father    • Coronary artery disease Brother    • Asthma Sister    • Osteoporosis Sister    • Osteoporosis Maternal Grandmother        Social History     Social History   • Marital status:      Spouse name: N/A   • Number of children: N/A   • Years of education: N/A     Occupational History   • Not on file.     Social History Main Topics   • Smoking status: Never Smoker   • Smokeless tobacco: Never Used   • Alcohol use Yes      Comment: 1-2 a month   • Drug use: No   • Sexual activity: Defer     Other Topics Concern   • Not on file     Social History Narrative       Current Outpatient Prescriptions on File Prior to Visit   Medication Sig Dispense Refill   • ACCU-CHEK KARMA PLUS test strip Use as instructed to test blood sugar daily. 100 each 3   • ACCU-CHEK SOFTCLIX LANCETS lancets Use as instructed to test blood sugar daily. 100 each 3   • albuterol (PROVENTIL HFA;VENTOLIN HFA) 108 (90 BASE) MCG/ACT inhaler Proventil  (90 Base) MCG/ACT Inhalation Aerosol Solution; Patient Sig: Proventil  (90 Base) MCG/ACT Inhalation Aerosol Solution INHALE 1 TO 2 PUFFS EVERY 4 TO 6 HOURS AS NEEDED.; 1; 5; 07-Apr-2014; Active     • albuterol (PROVENTIL HFA;VENTOLIN HFA) 108 (90 Base) MCG/ACT inhaler Inhale 2 puffs Every 4 (Four) Hours As Needed for Wheezing. 1 inhaler 0   • atorvastatin (LIPITOR) 40 MG tablet TAKE 1 TABLET DAILY AS DIRECTED 90 tablet 2   • budesonide-formoterol (SYMBICORT) 80-4.5 MCG/ACT inhaler Inhale 2 puffs 2 (two) times a day. 1 inhaler 2   • clobetasol (TEMOVATE) 0.05 % external solution Apply  topically 2 (Two) Times a Day. 50 mL 2   • Empagliflozin (JARDIANCE) 25 MG tablet Take 25 mg by mouth Daily. 30 tablet 6   • fenofibrate (TRICOR) 145 MG tablet TAKE 1 TABLET DAILY 90 tablet 2   •  "fluocinolone (SYNALAR) 0.01 % external solution Apply  topically 2 (Two) Times a Day. 60 mL 2   • FLUoxetine (PROzac) 10 MG capsule Take 1 capsule by mouth Daily. 90 capsule 3   • guaifenesin-codeine (GUAIFENESIN AC) 100-10 MG/5ML liquid Take 5 ml tid prn during the daytime and 5-10ml at bedtime for cough. 150 mL 0   • levothyroxine (SYNTHROID, LEVOTHROID) 88 MCG tablet TAKE 1 TABLET DAILY 90 tablet 3   • metFORMIN (GLUCOPHAGE) 1000 MG tablet TAKE 1 TABLET TWICE A  tablet 3   • metoprolol succinate XL (TOPROL-XL) 100 MG 24 hr tablet Take 1 tablet by mouth Daily. 90 tablet 4   • [DISCONTINUED] doxycycline (MONODOX) 100 MG capsule Take 1 capsule by mouth Every 12 (Twelve) Hours. 20 capsule 0   • [DISCONTINUED] promethazine-dextromethorphan (PROMETHAZINE-DM) 6.25-15 MG/5ML syrup Take 5 mL by mouth 4 (Four) Times a Day As Needed for Cough. 100 mL 0   • [DISCONTINUED] predniSONE (DELTASONE) 20 MG tablet Take 1 tab bid x 4 days, then 1 tab qd for 3 days then off. 11 tablet 0     No current facility-administered medications on file prior to visit.        Allergies   Allergen Reactions   • Morphine And Related    • Penicillins        Vitals:    02/21/18 0906   BP: 126/76   Pulse: 76   Resp: 18   Temp: 98.3 °F (36.8 °C)   SpO2: 98%   Weight: 92.5 kg (204 lb)   Height: 172.7 cm (67.99\")       Body mass index is 31.03 kg/(m^2).    Objective   Physical Exam   Constitutional: She is oriented to person, place, and time. She appears well-developed and well-nourished. No distress.   HENT:   Head: Normocephalic and atraumatic.   Eyes: Conjunctivae are normal. No scleral icterus.   Neck: Normal range of motion. Neck supple.   Cardiovascular: Normal rate, regular rhythm and normal heart sounds.    Pulmonary/Chest: Effort normal and breath sounds normal. No respiratory distress. She has no wheezes. She has no rales.   Neurological: She is alert and oriented to person, place, and time. No cranial nerve deficit.   Skin: She is " diaphoretic.   Psychiatric: She has a normal mood and affect. Her behavior is normal.       Results for orders placed or performed in visit on 02/21/18   POC CBC With / Auto Diff   Result Value Ref Range    WBC 7.7     RBC 5.48     Hemoglobin 15.7 g/dL    Hematocrit 47.6 %    MCV 86.8     MCH 28.7     MCHC 33.0     RDW-CV 15.7     MPV 7.2     Platelets 372 10*3/mm3    Neutrophil Rel % 71.9 %    Monocyte Rel % 7.2 %    Lymphocyte Rel % 20.9 %       Assessment/Plan   Diagnoses and all orders for this visit:    Acute non-recurrent frontal sinusitis  -     levoFLOXacin (LEVAQUIN) 500 MG tablet; Take 1 tablet by mouth Daily.    Bronchitis    Cough  -     XR Chest PA & Lateral  -     POC CBC With / Auto Diff    Shortness of breath  -     XR Chest PA & Lateral  -     POC CBC With / Auto Diff        Discussion/Summary  Namrata is here for acute care.  I am going to treat her with another abx given how long she has been dealing with this and since her symptoms seem to be worsening.  I did not appreciate any pneumonia on the CXR.  We will treat this as a bacterial sinusitis.  Advised to start mucinex and saline nasal spray as well.  If her cough is not gone in another week I have recommended CT for further evaluation given the duration of the cough.    No Follow-up on file.

## 2018-02-26 ENCOUNTER — TELEPHONE (OUTPATIENT)
Dept: INTERNAL MEDICINE | Facility: CLINIC | Age: 63
End: 2018-02-26

## 2018-02-26 DIAGNOSIS — R05.3 PERSISTENT COUGH FOR 3 WEEKS OR LONGER: Primary | ICD-10-CM

## 2018-02-26 NOTE — TELEPHONE ENCOUNTER
Pt is still not feeling well after taking antibiotics. She continues to have a cough, headache and no energy. She states  recommended a CT if her cough continued. Pt wants to know if she needs a CT ordered or schedule appointment with .

## 2018-03-04 ENCOUNTER — HOSPITAL ENCOUNTER (OUTPATIENT)
Dept: CT IMAGING | Facility: HOSPITAL | Age: 63
Discharge: HOME | End: 2018-03-04
Admitting: INTERNAL MEDICINE

## 2018-03-04 DIAGNOSIS — R05.3 PERSISTENT COUGH FOR 3 WEEKS OR LONGER: ICD-10-CM

## 2018-03-04 LAB — CREAT BLDA-MCNC: 0.9 MG/DL (ref 0.6–1.3)

## 2018-03-04 PROCEDURE — 71260 CT THORAX DX C+: CPT

## 2018-03-04 PROCEDURE — 82565 ASSAY OF CREATININE: CPT

## 2018-03-04 PROCEDURE — 0 IOPAMIDOL 61 % SOLUTION: Performed by: INTERNAL MEDICINE

## 2018-03-04 RX ADMIN — IOPAMIDOL 85 ML: 612 INJECTION, SOLUTION INTRAVENOUS at 10:11

## 2018-03-06 ENCOUNTER — TELEPHONE (OUTPATIENT)
Dept: INTERNAL MEDICINE | Facility: CLINIC | Age: 63
End: 2018-03-06

## 2018-03-06 DIAGNOSIS — R91.1 LUNG NODULE, SOLITARY: Primary | ICD-10-CM

## 2018-03-06 NOTE — TELEPHONE ENCOUNTER
----- Message from Aparna Sifuentes MD sent at 3/5/2018  4:04 PM EST -----  Please call - her chest CT does not show any infiltrate. There is a small nodule in the left lower lobe that they recommend re-imaging in 6 months.  How is her cough?

## 2018-03-06 NOTE — TELEPHONE ENCOUNTER
Pt called and stated that she still has a cough and thought maybe it's from post nasal drip, coughing up phlegm, but it isn't the hard cough that she had.     Curious if she start taking a decongestant or sinus medication.     Bear advised nasal saline spray with sinus medication. Pt informed, states understanding.

## 2018-03-06 NOTE — TELEPHONE ENCOUNTER
Please call - I just reviewed her chart from Dr. Alegria.  He has been following this nodule.  If she could get a copy of her imaging from her last CT chest and take to [facility] for them to compare, that would be helpful.  I doubt she needs a repeat CT in 6 months.

## 2018-05-02 RX ORDER — FENOFIBRATE 145 MG/1
TABLET, COATED ORAL
Qty: 90 TABLET | Refills: 2 | Status: SHIPPED | OUTPATIENT
Start: 2018-05-02 | End: 2018-05-21

## 2018-05-02 RX ORDER — METOPROLOL SUCCINATE 100 MG/1
TABLET, EXTENDED RELEASE ORAL
Qty: 90 TABLET | Refills: 4 | Status: SHIPPED | OUTPATIENT
Start: 2018-05-02 | End: 2018-05-21

## 2018-05-02 RX ORDER — ATORVASTATIN CALCIUM 40 MG/1
TABLET, FILM COATED ORAL
Qty: 90 TABLET | Refills: 2 | Status: SHIPPED | OUTPATIENT
Start: 2018-05-02 | End: 2018-05-21

## 2018-05-15 ENCOUNTER — OFFICE VISIT (OUTPATIENT)
Dept: ORTHOPEDIC SURGERY | Facility: CLINIC | Age: 63
End: 2018-05-15

## 2018-05-15 VITALS — BODY MASS INDEX: 30.31 KG/M2 | TEMPERATURE: 97.3 F | WEIGHT: 200 LBS | HEIGHT: 68 IN

## 2018-05-15 DIAGNOSIS — M25.562 CHRONIC PAIN OF LEFT KNEE: Primary | ICD-10-CM

## 2018-05-15 DIAGNOSIS — G89.29 CHRONIC PAIN OF LEFT KNEE: Primary | ICD-10-CM

## 2018-05-15 PROCEDURE — 99214 OFFICE O/P EST MOD 30 MIN: CPT | Performed by: ORTHOPAEDIC SURGERY

## 2018-05-15 PROCEDURE — 73562 X-RAY EXAM OF KNEE 3: CPT | Performed by: ORTHOPAEDIC SURGERY

## 2018-05-15 RX ORDER — PREGABALIN 25 MG/1
150 CAPSULE ORAL ONCE
Status: CANCELLED | OUTPATIENT
Start: 2018-06-01 | End: 2018-06-01

## 2018-05-15 RX ORDER — MELOXICAM 7.5 MG/1
15 TABLET ORAL ONCE
Status: CANCELLED | OUTPATIENT
Start: 2018-06-01 | End: 2018-06-01

## 2018-05-15 NOTE — PROGRESS NOTES
Patient: Namrata Luz  YOB: 1955 62 y.o. female  Medical Record Number: [id number]    Chief Complaints:   Chief Complaint   Patient presents with   • Left Knee - Follow-up, Pain       History of Present Illness:Namrata Luz is a 62 y.o. female who presents With increased left knee pain.  She has known bone-on-bone end-stage osteoarthritis and has tried and filled all conservative measures including repeat cortisone injections, anti-inflammatories, and physical therapy.  She is ready to proceed with surgery    Allergies:   Allergies   Allergen Reactions   • Morphine And Related    • Penicillins        Medications:   Current Outpatient Prescriptions   Medication Sig Dispense Refill   • ACCU-CHEK KARMA PLUS test strip Use as instructed to test blood sugar daily. 100 each 3   • ACCU-CHEK SOFTCLIX LANCETS lancets Use as instructed to test blood sugar daily. 100 each 3   • albuterol (PROVENTIL HFA;VENTOLIN HFA) 108 (90 BASE) MCG/ACT inhaler Proventil  (90 Base) MCG/ACT Inhalation Aerosol Solution; Patient Sig: Proventil  (90 Base) MCG/ACT Inhalation Aerosol Solution INHALE 1 TO 2 PUFFS EVERY 4 TO 6 HOURS AS NEEDED.; 1; 5; 07-Apr-2014; Active     • atorvastatin (LIPITOR) 40 MG tablet TAKE 1 TABLET DAILY AS DIRECTED 90 tablet 2   • budesonide-formoterol (SYMBICORT) 80-4.5 MCG/ACT inhaler Inhale 2 puffs 2 (two) times a day. 1 inhaler 2   • clobetasol (TEMOVATE) 0.05 % external solution Apply  topically 2 (Two) Times a Day. 50 mL 2   • Empagliflozin (JARDIANCE) 25 MG tablet Take 25 mg by mouth Daily. 90 tablet 3   • fenofibrate (TRICOR) 145 MG tablet TAKE 1 TABLET DAILY 90 tablet 2   • fluocinolone (SYNALAR) 0.01 % external solution Apply  topically 2 (Two) Times a Day. 60 mL 2   • FLUoxetine (PROzac) 10 MG capsule Take 1 capsule by mouth Daily. 90 capsule 3   • levoFLOXacin (LEVAQUIN) 500 MG tablet Take 1 tablet by mouth Daily. 10 tablet 0   • levothyroxine (SYNTHROID, LEVOTHROID) 88 MCG  "tablet TAKE 1 TABLET DAILY 90 tablet 3   • metFORMIN (GLUCOPHAGE) 1000 MG tablet TAKE 1 TABLET TWICE A  tablet 3   • metoprolol succinate XL (TOPROL-XL) 100 MG 24 hr tablet TAKE 1 TABLET DAILY 90 tablet 4     No current facility-administered medications for this visit.          The following portions of the patient's history were reviewed and updated as appropriate: allergies, current medications, past family history, past medical history, past social history, past surgical history and problem list.    Review of Systems:   A 14 point review of systems was performed. All systems negative except pertinent positives/negative listed in HPI above    Physical Exam:   Vitals:    05/15/18 1633   Temp: 97.3 °F (36.3 °C)   Weight: 90.7 kg (200 lb)   Height: 172.7 cm (68\")       General: A and O x 3, ASA, NAD    SCLERA:    Normal    DENTITION:   Normal  Skin clear no unusual lesions noted  Left knee patient does have 1+ effusion noted with 110° flexion neutral in extension with a positive medial Noreen negative Lockman calf is soft and nontender    Radiology:  Xrays 3views (ap,lateral, sunrise) left knee were ordered and reviewed today secondary to pain and show bone-on-bone end-stage osteoarthritis of the medial compartment.  Comparative views are unchanged     Assessment/Plan:  End-stage osteoarthritis left knee    Dr. King saw the patient as well.  Continuation of conservative management vs. UKA vs TKA discussed. After stating understanding of the procedures and associated risks / benefit / alternatives of the various options,  the patient wishes to proceed with unicompartmental knee replacement.  At this point the patient has failed the full gamut of conservative treatment and stating complete understanding of the risks / benefits / anternatives wishes to proceed with surgical treatment.    The patient understands that no guarantees have been made with regard to outcomes of this procedure and that there is a " possibility that future surgery is a possibility including conversion to total knee replacement should further disease progression occur in other compartments of the knee.    Risk and benefits of surgery were reviewed.  Including, but not limited to, blood clots or pulmonary embolism, anesthesia risk, infection, fracture, skin/leg numbness, persistent pain/crepitance/popping/catching, failure of the implant, need for future surgeries, hematoma, possible nerve or blood vessel injury, need for transfusion, and potential risk of stroke,heart attack or death, among others.  The patient understands and wishes to proceed.     It was explained that if tissue has been repaired or reconstructed, there is also an increased chance of failure which may require further management.  Following the completion of the discussion, the patient expressed understanding of this planned course of care, all their questions were answered and consent will be obtained preoperatively.    Operative Plan:  [facility]/ [facility] unicompartmental knee replacement performing the procedure on an outpatient basiswith home health rehab

## 2018-05-17 ENCOUNTER — TELEPHONE (OUTPATIENT)
Dept: INTERNAL MEDICINE | Facility: CLINIC | Age: 63
End: 2018-05-17

## 2018-05-17 PROBLEM — M25.562 CHRONIC PAIN OF LEFT KNEE: Status: ACTIVE | Noted: 2018-05-17

## 2018-05-17 PROBLEM — G89.29 CHRONIC PAIN OF LEFT KNEE: Status: ACTIVE | Noted: 2018-05-17

## 2018-05-17 NOTE — TELEPHONE ENCOUNTER
Pt called to informed  , she is having  Knee surgery   June 1 ,2018 . She may not make it in for her appt June 12th . She will call if she can not make it in .

## 2018-05-18 ENCOUNTER — PREP FOR SURGERY (OUTPATIENT)
Dept: OTHER | Facility: HOSPITAL | Age: 63
End: 2018-05-18

## 2018-05-18 DIAGNOSIS — M17.12 PRIMARY OSTEOARTHRITIS OF LEFT KNEE: Primary | ICD-10-CM

## 2018-05-21 ENCOUNTER — APPOINTMENT (OUTPATIENT)
Dept: PREADMISSION TESTING | Facility: HOSPITAL | Age: 63
End: 2018-05-21

## 2018-05-21 VITALS
OXYGEN SATURATION: 98 % | BODY MASS INDEX: 31.42 KG/M2 | HEART RATE: 68 BPM | WEIGHT: 207.31 LBS | TEMPERATURE: 98.1 F | RESPIRATION RATE: 16 BRPM | DIASTOLIC BLOOD PRESSURE: 77 MMHG | SYSTOLIC BLOOD PRESSURE: 134 MMHG | HEIGHT: 68 IN

## 2018-05-21 DIAGNOSIS — G89.29 CHRONIC PAIN OF LEFT KNEE: ICD-10-CM

## 2018-05-21 DIAGNOSIS — M17.12 PRIMARY OSTEOARTHRITIS OF LEFT KNEE: ICD-10-CM

## 2018-05-21 DIAGNOSIS — M25.562 CHRONIC PAIN OF LEFT KNEE: ICD-10-CM

## 2018-05-21 LAB
ANION GAP SERPL CALCULATED.3IONS-SCNC: 10.7 MMOL/L
BILIRUB UR QL STRIP: NEGATIVE
BUN BLD-MCNC: 15 MG/DL (ref 8–23)
BUN/CREAT SERPL: 17.6 (ref 7–25)
CALCIUM SPEC-SCNC: 10.1 MG/DL (ref 8.6–10.5)
CHLORIDE SERPL-SCNC: 99 MMOL/L (ref 98–107)
CLARITY UR: CLEAR
CO2 SERPL-SCNC: 28.3 MMOL/L (ref 22–29)
COLOR UR: YELLOW
CREAT BLD-MCNC: 0.85 MG/DL (ref 0.57–1)
DEPRECATED RDW RBC AUTO: 47.4 FL (ref 37–54)
ERYTHROCYTE [DISTWIDTH] IN BLOOD BY AUTOMATED COUNT: 14.4 % (ref 11.7–13)
GFR SERPL CREATININE-BSD FRML MDRD: 68 ML/MIN/1.73
GLUCOSE BLD-MCNC: 119 MG/DL (ref 65–99)
GLUCOSE UR STRIP-MCNC: ABNORMAL MG/DL
HCT VFR BLD AUTO: 45.6 % (ref 35.6–45.5)
HGB BLD-MCNC: 14.9 G/DL (ref 11.9–15.5)
HGB UR QL STRIP.AUTO: NEGATIVE
KETONES UR QL STRIP: NEGATIVE
LEUKOCYTE ESTERASE UR QL STRIP.AUTO: NEGATIVE
MCH RBC QN AUTO: 29.7 PG (ref 26.9–32)
MCHC RBC AUTO-ENTMCNC: 32.7 G/DL (ref 32.4–36.3)
MCV RBC AUTO: 91 FL (ref 80.5–98.2)
NITRITE UR QL STRIP: NEGATIVE
PH UR STRIP.AUTO: <=5 [PH] (ref 5–8)
PLATELET # BLD AUTO: 277 10*3/MM3 (ref 140–500)
PMV BLD AUTO: 11.1 FL (ref 6–12)
POTASSIUM BLD-SCNC: 4.6 MMOL/L (ref 3.5–5.2)
PROT UR QL STRIP: NEGATIVE
RBC # BLD AUTO: 5.01 10*6/MM3 (ref 3.9–5.2)
SODIUM BLD-SCNC: 138 MMOL/L (ref 136–145)
SP GR UR STRIP: >=1.03 (ref 1–1.03)
UROBILINOGEN UR QL STRIP: ABNORMAL
WBC NRBC COR # BLD: 6.45 10*3/MM3 (ref 4.5–10.7)

## 2018-05-21 PROCEDURE — 93005 ELECTROCARDIOGRAM TRACING: CPT

## 2018-05-21 PROCEDURE — 36415 COLL VENOUS BLD VENIPUNCTURE: CPT

## 2018-05-21 PROCEDURE — 85027 COMPLETE CBC AUTOMATED: CPT | Performed by: ORTHOPAEDIC SURGERY

## 2018-05-21 PROCEDURE — 93010 ELECTROCARDIOGRAM REPORT: CPT | Performed by: INTERNAL MEDICINE

## 2018-05-21 PROCEDURE — 81003 URINALYSIS AUTO W/O SCOPE: CPT | Performed by: NURSE PRACTITIONER

## 2018-05-21 PROCEDURE — 80048 BASIC METABOLIC PNL TOTAL CA: CPT | Performed by: ORTHOPAEDIC SURGERY

## 2018-05-21 RX ORDER — NAPROXEN SODIUM 220 MG
440 TABLET ORAL 2 TIMES DAILY PRN
COMMUNITY
End: 2018-06-01 | Stop reason: HOSPADM

## 2018-05-21 RX ORDER — ATORVASTATIN CALCIUM 40 MG/1
40 TABLET, FILM COATED ORAL EVERY MORNING
COMMUNITY
End: 2019-05-09 | Stop reason: SDUPTHER

## 2018-05-21 RX ORDER — LEVOTHYROXINE SODIUM 88 UG/1
88 TABLET ORAL EVERY MORNING
COMMUNITY
End: 2018-10-22 | Stop reason: SDUPTHER

## 2018-05-21 RX ORDER — FENOFIBRATE 145 MG/1
145 TABLET, COATED ORAL EVERY EVENING
COMMUNITY
End: 2019-01-29 | Stop reason: SDUPTHER

## 2018-05-21 RX ORDER — METOPROLOL SUCCINATE 100 MG/1
100 TABLET, EXTENDED RELEASE ORAL EVERY MORNING
COMMUNITY
End: 2020-01-13 | Stop reason: SDUPTHER

## 2018-05-21 RX ORDER — CHLORHEXIDINE GLUCONATE 500 MG/1
CLOTH TOPICAL
COMMUNITY
End: 2018-06-01 | Stop reason: HOSPADM

## 2018-05-21 ASSESSMENT — KOOS JR
KOOS JR SCORE: 42.281
KOOS JR SCORE: 18

## 2018-05-21 NOTE — DISCHARGE INSTRUCTIONS
Take the following medications the morning of surgery with a small sip of water:    METOPROLOL    General Instructions:  • Do not eat solid food after midnight the night before surgery.  • You may drink clear liquids day of surgery but must stop at least one hour before your hospital arrival time.   ( 0430 AM )  It is beneficial for you to have a clear drink that contains carbohydrates the day of surgery.  We suggest  a 12 to 20 ounce bottle of G2 or Powerade Zero for diabetic patients.  Clear liquids are liquids you can see through.  Nothing red in color.     Plain water                               Sports drinks  Sodas                                   Gelatin (Jell-O)  Fruit juices without pulp such as white grape juice and apple juice  Popsicles that contain no fruit or yogurt  Tea or coffee (no cream or milk added)  Gatorade / Powerade  G2 / Powerade Zero     • Patients who avoid smoking, chewing tobacco and alcohol for 4 weeks prior to surgery have a reduced risk of post-operative complications.   • Do not smoke, use chewing tobacco or drink alcohol the day of surgery.   • If applicable bring your C-PAP/ BI-PAP machine.  • Bring any papers given to you in the doctor’s office.  • Wear clean comfortable clothes and socks.  • Do not wear contact lenses or make-up.  Bring a case for your glasses.   • Remove all piercings.  Leave jewelry and any other valuables at home.  • Hair extensions with metal clips must be removed prior to surgery.  • The Pre-Admission Testing nurse will instruct you to bring medications if unable to obtain an accurate list in Pre-Admission Testing.    • REPORT TO MAIN SURGERY ON 6-1-2018  AT 0530 AM          Preventing a Surgical Site Infection:  • For 2 to 3 days before surgery, avoid shaving with a razor because the razor can irritate skin and make it easier to develop an infection.  • The night prior to surgery sleep in a clean bed with clean clothing.  Do not allow pets to sleep with  you.  • Shower on the morning of surgery using a fresh bar of anti-bacterial soap (such as Dial) and clean washcloth.  Dry with a clean towel and dress in clean clothing.  • Ask your surgeon if you will be receiving antibiotics prior to surgery.  • Make sure you, your family, and all healthcare providers clean their hands with soap and water or an alcohol based hand  before caring for you or your wound.    Day of surgery:  Upon arrival, a Pre-op nurse and Anesthesiologist will review your health history, obtain vital signs, and answer questions you may have.  The only belongings needed at this time will be your home medications and if applicable your C-PAP/BI-PAP machine.  If you are staying overnight your family can leave the rest of your belongings in the car and bring them to your room later.  A Pre-op nurse will start an IV and you may receive medication in preparation for surgery, including something to help you relax.  Your family will be able to see you in the Pre-op area.  While you are in surgery your family should notify the waiting room  if they leave the waiting room area and provide a contact phone number.    Please be aware that surgery does come with discomfort.  We want to make every effort to control your discomfort so please discuss any uncontrolled symptoms with your nurse.   Your doctor will most likely have prescribed pain medications.          2% CHLORAHEXIDINE GLUCONATE* CLOTH    X2  Preparing or “prepping” skin before surgery can reduce the risk of infection at the surgical site. To make the process easier, [facility] has chosen disposable cloths moistened with a rinse-free, 2% Chlorhexidine Gluconate (CHG) antiseptic solution. The steps below outline the prepping process and should be carefully followed.        Use the prep cloth on the area that is circled in the diagram             Directions Night before Surgery  1) Shower using a fresh bar of  anti-bacterial soap (such as Dial) and clean washcloth.  Use a clean towel to completely dry your skin.  2) Do not use any lotions, oils or creams on your skin.  3) Open the package and remove 1 cloth, wipe your skin for 30 seconds in a circular motion.  Allow to dry for 3 minutes.  4) Repeat #3 with second cloth.  5) Do not touch your eyes, ears, or mouth with the prep cloth.  6) Allow the wet prep solution to air dry.  7) Discard the prep cloth and wash your hands with soap and water.   8) Dress in clean bed clothes and sleep on fresh clean bed sheets.   9) You may experience some temporary itching after the prep.    Directions Day of Surgery  1) Repeat steps 1,2,3,4,5,6,7, and 9.   2) Dress in clean clothes before coming to the hospital.    BACTROBAN NASAL OINTMENT  There are many germs normally in your nose. Bactroban is an ointment that will help reduce these germs. Please follow these instructions for Bactroban use:      _1___The day before surgery in the morning  [id number]_______    __2__The day before surgery in the evening              [id number]_______    __3__The day of surgery in the morning    [id number]_______    **Squirt ½ package of Bactroban Ointment onto a cotton applicator and apply to inside of 1st nostril.  Squirt the remaining Bactroban and apply to the inside of the other nostril.      If you are going home after surgery you will receive individualized written care instructions before being discharged, a responsible adult must drive you to and from the hospital on the day   Of your surgery and stay with you for 24 hours.    If you have any questions please call Pre-Admission Testing at [phone].  Deductibles and co-payments are collected on the day of service. Please be prepared to pay the required co-pay, deductible or deposit on the day of service as defined by your plan.

## 2018-05-24 ENCOUNTER — OFFICE VISIT (OUTPATIENT)
Dept: ORTHOPEDIC SURGERY | Facility: CLINIC | Age: 63
End: 2018-05-24

## 2018-05-24 VITALS
WEIGHT: 207 LBS | BODY MASS INDEX: 31.37 KG/M2 | SYSTOLIC BLOOD PRESSURE: 130 MMHG | TEMPERATURE: 98.7 F | HEIGHT: 68 IN | DIASTOLIC BLOOD PRESSURE: 62 MMHG

## 2018-05-24 DIAGNOSIS — M17.12 PRIMARY OSTEOARTHRITIS OF ONE KNEE, LEFT: Primary | ICD-10-CM

## 2018-05-24 PROCEDURE — 77077 JOINT SURVEY SINGLE VIEW: CPT | Performed by: ORTHOPAEDIC SURGERY

## 2018-05-24 PROCEDURE — S0260 H&P FOR SURGERY: HCPCS | Performed by: NURSE PRACTITIONER

## 2018-05-24 NOTE — H&P
Patient: Namrata Luz    Date of Admission: 6/1/18    YOB: 1955    Medical Record Number: [id number]    Admitting Physician: Dr. Emanuel Jack    Reason for Admission: End Stage Left Medial Knee OA    History of Present Illness: 62 y.o. female presents with severe end stage medial compartment knee osteoarthritis which has not been responsive to the full compliment of conservative measures. Despite conservative attempts, there is still severe, constant activity limiting pain. Given the severity of the pain, the patient has elected to proceed with knee replacement.    Allergies:   Allergies   Allergen Reactions   • Morphine And Related Hives   • Penicillins Hives         Current Medications:  Scheduled Meds:  PRN Meds:.    PMH:  Past Medical History:   Diagnosis Date   • Abnormal LFTs    • Allergic rhinitis    • Anxiety    • Arthritis    • Asthma    • Depression    • Diabetes mellitus     PRE DIABETES   • Disease of thyroid gland    • Endometriosis    • Fatty liver    • Fractures 2007   • Hyperlipidemia    • Hypertension     ON TOPROL FOR MVP   • Limited joint range of motion     LT KNEE   • Lung nodule    • Mitral valve prolapse    • Prediabetes    • Psoriasis    • Renal insufficiency    • Sleep apnea     USES C-PAP   • Tachycardia    • Vitamin D deficiency        PSurg/Soc/Fam Hx:     Past Surgical History:   Procedure Laterality Date   • BACK SURGERY  1982   • CATARACT EXTRACTION, BILATERAL Bilateral 2015   • CHOLECYSTECTOMY  2000'S   • HYSTERECTOMY  1982   • LUMBAR DISCECTOMY  1982   • TIBIA FASCIOTOMY Left 2006   • TUBAL ABDOMINAL LIGATION Bilateral 1981        Social History     Occupational History   • Not on file.     Social History Main Topics   • Smoking status: Never Smoker   • Smokeless tobacco: Never Used      Comment: lots of second hand smoke during childhood   • Alcohol use Yes     0 Standard drinks or equivalent per week      Comment: maybe have 1 drink every couple of months   • Drug  "use: No   • Sexual activity: Not Currently     Partners: Male     Birth control/ protection: Post-menopausal      Social History     Social History Narrative   • No narrative on file        Family History   Problem Relation Age of Onset   • Vaginal cancer Mother    • Lung cancer Mother    • Arthritis Mother    • Diabetes Mother            • Cancer Mother    • Coronary artery disease Father    • Arthritis Father    • Kidney cancer Father    • Cancer Father    • Coronary artery disease Brother    • Asthma Sister    • Osteoporosis Sister    • Osteoporosis Maternal Grandmother    • Osteoporosis Sister    • Malig Hyperthermia Neg Hx          Review of Systems:   A 14 point review of systems was performed, pertinent positives discussed above, all other systems are negative    Physical Exam: 62 y.o. female  Vital Signs :   Vitals:    18 1414   BP: 130/62   BP Location: Left arm   Temp: 98.7 °F (37.1 °C)   TempSrc: Temporal Artery    Weight: 93.9 kg (207 lb)   Height: 172.7 cm (68\")     General: Alert and Oriented x 3, No acute distress.  Psych: mood and affect appropriate; recent and remote memory intact  Eyes: conjunctiva clear; pupils equally round and reactive, sclera nonicteric  CV: no peripheral edema  Resp: normal respiratory effort  Skin: no rashes or wounds; normal turgor  Musculosketetal: pain localized to the medial aspect of the knee. Neutral alignment in stance. No patellofemoral crepitance or pain with patellofemoral grind. Negative Lachman  Vascular: palpable distal pulses    Xrays:  -3 views (AP, lateral, and sunrise) were reviewed demonstrating end-stage isolated medial compartment OA with medial bone on bone articulation. The lateral and patellofemoral compartments are well preserved.  -A full length AP xray was ordered today for purposes of operative alignment demonstrating end stage medial compartment arthritic findings. There are no previous full length films for review    Assessment:  " End-stage Left knee medial compartment osteoarthritis. Conservative measures have failed.      Plan:  The plan is to proceed with Left Unicompartmental Knee Replacement possible Total Knee Replacement. The patient voiced understanding of the risks, benefits, and alternative forms of treatment that were discussed with Dr Jack at the time of scheduling.  Patient spent on going home with home health same day    Holly Smallwood, APRN  5/24/2018

## 2018-06-01 ENCOUNTER — ANESTHESIA (OUTPATIENT)
Dept: PERIOP | Facility: HOSPITAL | Age: 63
End: 2018-06-01

## 2018-06-01 ENCOUNTER — ANESTHESIA EVENT (OUTPATIENT)
Dept: PERIOP | Facility: HOSPITAL | Age: 63
End: 2018-06-01

## 2018-06-01 ENCOUNTER — APPOINTMENT (OUTPATIENT)
Dept: GENERAL RADIOLOGY | Facility: HOSPITAL | Age: 63
End: 2018-06-01

## 2018-06-01 ENCOUNTER — HOSPITAL ENCOUNTER (OUTPATIENT)
Facility: HOSPITAL | Age: 63
Discharge: HOME HEALTH | End: 2018-06-01
Attending: ORTHOPAEDIC SURGERY | Admitting: ORTHOPAEDIC SURGERY

## 2018-06-01 VITALS
RESPIRATION RATE: 16 BRPM | OXYGEN SATURATION: 95 % | WEIGHT: 207 LBS | HEART RATE: 61 BPM | BODY MASS INDEX: 31.37 KG/M2 | DIASTOLIC BLOOD PRESSURE: 68 MMHG | HEIGHT: 68 IN | SYSTOLIC BLOOD PRESSURE: 101 MMHG | TEMPERATURE: 97.6 F

## 2018-06-01 DIAGNOSIS — M25.562 CHRONIC PAIN OF LEFT KNEE: ICD-10-CM

## 2018-06-01 DIAGNOSIS — G89.29 CHRONIC PAIN OF LEFT KNEE: ICD-10-CM

## 2018-06-01 LAB
GLUCOSE BLDC GLUCOMTR-MCNC: 126 MG/DL (ref 70–130)
GLUCOSE BLDC GLUCOMTR-MCNC: 149 MG/DL (ref 70–130)

## 2018-06-01 PROCEDURE — 25010000002 VANCOMYCIN 10 G RECONSTITUTED SOLUTION: Performed by: NURSE PRACTITIONER

## 2018-06-01 PROCEDURE — 25010000002 PROMETHAZINE PER 50 MG: Performed by: NURSE ANESTHETIST, CERTIFIED REGISTERED

## 2018-06-01 PROCEDURE — C1776 JOINT DEVICE (IMPLANTABLE): HCPCS | Performed by: ORTHOPAEDIC SURGERY

## 2018-06-01 PROCEDURE — 25010000002 FENTANYL CITRATE (PF) 100 MCG/2ML SOLUTION: Performed by: ANESTHESIOLOGY

## 2018-06-01 PROCEDURE — 97110 THERAPEUTIC EXERCISES: CPT

## 2018-06-01 PROCEDURE — 25010000002 ROPIVACAINE PER 1 MG: Performed by: ANESTHESIOLOGY

## 2018-06-01 PROCEDURE — 82962 GLUCOSE BLOOD TEST: CPT

## 2018-06-01 PROCEDURE — 25010000002 KETOROLAC TROMETHAMINE PER 15 MG: Performed by: ORTHOPAEDIC SURGERY

## 2018-06-01 PROCEDURE — 25010000002 PROPOFOL 10 MG/ML EMULSION: Performed by: NURSE ANESTHETIST, CERTIFIED REGISTERED

## 2018-06-01 PROCEDURE — 97161 PT EVAL LOW COMPLEX 20 MIN: CPT

## 2018-06-01 PROCEDURE — 27447 TOTAL KNEE ARTHROPLASTY: CPT | Performed by: ORTHOPAEDIC SURGERY

## 2018-06-01 PROCEDURE — 27447 TOTAL KNEE ARTHROPLASTY: CPT | Performed by: NURSE PRACTITIONER

## 2018-06-01 PROCEDURE — 25010000002 ONDANSETRON PER 1 MG: Performed by: NURSE ANESTHETIST, CERTIFIED REGISTERED

## 2018-06-01 PROCEDURE — C1713 ANCHOR/SCREW BN/BN,TIS/BN: HCPCS | Performed by: ORTHOPAEDIC SURGERY

## 2018-06-01 PROCEDURE — 25010000002 HYDROMORPHONE PER 4 MG: Performed by: NURSE ANESTHETIST, CERTIFIED REGISTERED

## 2018-06-01 PROCEDURE — 73560 X-RAY EXAM OF KNEE 1 OR 2: CPT

## 2018-06-01 PROCEDURE — 25010000002 MIDAZOLAM PER 1 MG: Performed by: ANESTHESIOLOGY

## 2018-06-01 DEVICE — TIBIA BASE SIZE 3 LEFT                                    MEDIAL/RIGHT LATERAL
Type: IMPLANTABLE DEVICE | Site: KNEE | Status: FUNCTIONAL
Brand: ZUK

## 2018-06-01 DEVICE — IMPLANTABLE DEVICE: Type: IMPLANTABLE DEVICE | Site: KNEE | Status: FUNCTIONAL

## 2018-06-01 DEVICE — PALACOS® R IS A FAST-CURING, RADIOPAQUE, POLY(METHYL METHACRYLATE)-BASED BONE CEMENT.PALACOS ® R CONTAINS THE X-RAY CONTRAST MEDIUM ZIRCONIUM DIOXIDE. TO IMPROVE VISIBILITY IN THE SURGICAL FIELD PALACOS ® R HAS BEEN COLOURED WITH CHLOROPHYLL (E141). THE BONE CEMENT IS PREPARED DIRECTLY BEFORE USE BY MIXING A POLYMER POWDER COMPONENT WITH A LIQUID MONOMER COMPONENT. A DUCTILE DOUGH FORMS WHICH CURES WITHIN A FEW MINUTES.
Type: IMPLANTABLE DEVICE | Site: KNEE | Status: FUNCTIONAL
Brand: PALACOS®

## 2018-06-01 DEVICE — FEMORAL SIZE D LT MEDIAL/RT LATERAL
Type: IMPLANTABLE DEVICE | Site: KNEE | Status: FUNCTIONAL
Brand: ZUK

## 2018-06-01 DEVICE — VIVACIT-E ARTICULAR SURFACE SIZE 3 9MM
Type: IMPLANTABLE DEVICE | Site: KNEE | Status: FUNCTIONAL
Brand: ZUK

## 2018-06-01 RX ORDER — PSEUDOEPHEDRINE HCL 30 MG
100 TABLET ORAL 2 TIMES DAILY
Qty: 28 CAPSULE | Refills: 0 | Status: SHIPPED | OUTPATIENT
Start: 2018-06-01 | End: 2018-06-15

## 2018-06-01 RX ORDER — FENTANYL CITRATE 50 UG/ML
50 INJECTION, SOLUTION INTRAMUSCULAR; INTRAVENOUS
Status: DISCONTINUED | OUTPATIENT
Start: 2018-06-01 | End: 2018-06-01 | Stop reason: HOSPADM

## 2018-06-01 RX ORDER — LABETALOL HYDROCHLORIDE 5 MG/ML
5 INJECTION, SOLUTION INTRAVENOUS
Status: DISCONTINUED | OUTPATIENT
Start: 2018-06-01 | End: 2018-06-01 | Stop reason: HOSPADM

## 2018-06-01 RX ORDER — EPHEDRINE SULFATE 50 MG/ML
5 INJECTION, SOLUTION INTRAVENOUS ONCE AS NEEDED
Status: DISCONTINUED | OUTPATIENT
Start: 2018-06-01 | End: 2018-06-01 | Stop reason: HOSPADM

## 2018-06-01 RX ORDER — MEPERIDINE HYDROCHLORIDE 25 MG/ML
12.5 INJECTION INTRAMUSCULAR; INTRAVENOUS; SUBCUTANEOUS
Status: DISCONTINUED | OUTPATIENT
Start: 2018-06-01 | End: 2018-06-01 | Stop reason: HOSPADM

## 2018-06-01 RX ORDER — ACETAMINOPHEN 10 MG/ML
1000 INJECTION, SOLUTION INTRAVENOUS ONCE
Status: DISCONTINUED | OUTPATIENT
Start: 2018-06-01 | End: 2018-06-01 | Stop reason: HOSPADM

## 2018-06-01 RX ORDER — HYDROCODONE BITARTRATE AND ACETAMINOPHEN 7.5; 325 MG/1; MG/1
TABLET ORAL
Qty: 60 TABLET | Refills: 0 | Status: SHIPPED | OUTPATIENT
Start: 2018-06-01 | End: 2018-06-25 | Stop reason: SDUPTHER

## 2018-06-01 RX ORDER — PROMETHAZINE HYDROCHLORIDE 25 MG/1
25 SUPPOSITORY RECTAL ONCE AS NEEDED
Status: COMPLETED | OUTPATIENT
Start: 2018-06-01 | End: 2018-06-01

## 2018-06-01 RX ORDER — PREGABALIN 25 MG/1
150 CAPSULE ORAL ONCE
Status: COMPLETED | OUTPATIENT
Start: 2018-06-01 | End: 2018-06-01

## 2018-06-01 RX ORDER — CLINDAMYCIN PHOSPHATE 900 MG/50ML
900 INJECTION INTRAVENOUS EVERY 8 HOURS
Status: DISCONTINUED | OUTPATIENT
Start: 2018-06-01 | End: 2018-06-01 | Stop reason: HOSPADM

## 2018-06-01 RX ORDER — HYDROMORPHONE HYDROCHLORIDE 1 MG/ML
0.5 INJECTION, SOLUTION INTRAMUSCULAR; INTRAVENOUS; SUBCUTANEOUS
Status: DISCONTINUED | OUTPATIENT
Start: 2018-06-01 | End: 2018-06-01 | Stop reason: HOSPADM

## 2018-06-01 RX ORDER — MELOXICAM 15 MG/1
15 TABLET ORAL DAILY
Status: DISCONTINUED | OUTPATIENT
Start: 2018-06-01 | End: 2018-06-01 | Stop reason: HOSPADM

## 2018-06-01 RX ORDER — DIPHENHYDRAMINE HYDROCHLORIDE 50 MG/ML
12.5 INJECTION INTRAMUSCULAR; INTRAVENOUS
Status: DISCONTINUED | OUTPATIENT
Start: 2018-06-01 | End: 2018-06-01 | Stop reason: HOSPADM

## 2018-06-01 RX ORDER — NALOXONE HCL 0.4 MG/ML
0.2 VIAL (ML) INJECTION AS NEEDED
Status: DISCONTINUED | OUTPATIENT
Start: 2018-06-01 | End: 2018-06-01 | Stop reason: HOSPADM

## 2018-06-01 RX ORDER — PROMETHAZINE HYDROCHLORIDE 25 MG/ML
12.5 INJECTION, SOLUTION INTRAMUSCULAR; INTRAVENOUS ONCE AS NEEDED
Status: COMPLETED | OUTPATIENT
Start: 2018-06-01 | End: 2018-06-01

## 2018-06-01 RX ORDER — ONDANSETRON 2 MG/ML
INJECTION INTRAMUSCULAR; INTRAVENOUS AS NEEDED
Status: DISCONTINUED | OUTPATIENT
Start: 2018-06-01 | End: 2018-06-01 | Stop reason: SURG

## 2018-06-01 RX ORDER — FAMOTIDINE 10 MG/ML
20 INJECTION, SOLUTION INTRAVENOUS ONCE
Status: COMPLETED | OUTPATIENT
Start: 2018-06-01 | End: 2018-06-01

## 2018-06-01 RX ORDER — LEVOTHYROXINE SODIUM 88 UG/1
88 TABLET ORAL EVERY MORNING
Status: DISCONTINUED | OUTPATIENT
Start: 2018-06-01 | End: 2018-06-01 | Stop reason: HOSPADM

## 2018-06-01 RX ORDER — MELOXICAM 7.5 MG/1
15 TABLET ORAL ONCE
Status: COMPLETED | OUTPATIENT
Start: 2018-06-01 | End: 2018-06-01

## 2018-06-01 RX ORDER — PROMETHAZINE HYDROCHLORIDE 25 MG/1
25 TABLET ORAL ONCE AS NEEDED
Status: COMPLETED | OUTPATIENT
Start: 2018-06-01 | End: 2018-06-01

## 2018-06-01 RX ORDER — ROPIVACAINE HYDROCHLORIDE 5 MG/ML
INJECTION, SOLUTION EPIDURAL; INFILTRATION; PERINEURAL AS NEEDED
Status: DISCONTINUED | OUTPATIENT
Start: 2018-06-01 | End: 2018-06-01 | Stop reason: SURG

## 2018-06-01 RX ORDER — ALBUTEROL SULFATE 90 UG/1
2 AEROSOL, METERED RESPIRATORY (INHALATION) EVERY 4 HOURS PRN
Status: DISCONTINUED | OUTPATIENT
Start: 2018-06-01 | End: 2018-06-01 | Stop reason: CLARIF

## 2018-06-01 RX ORDER — FENTANYL CITRATE 50 UG/ML
50 INJECTION, SOLUTION INTRAMUSCULAR; INTRAVENOUS
Status: COMPLETED | OUTPATIENT
Start: 2018-06-01 | End: 2018-06-01

## 2018-06-01 RX ORDER — HYDROCODONE BITARTRATE AND ACETAMINOPHEN 7.5; 325 MG/1; MG/1
1 TABLET ORAL EVERY 4 HOURS PRN
Status: DISCONTINUED | OUTPATIENT
Start: 2018-06-01 | End: 2018-06-01 | Stop reason: HOSPADM

## 2018-06-01 RX ORDER — METOPROLOL SUCCINATE 50 MG/1
100 TABLET, EXTENDED RELEASE ORAL EVERY MORNING
Status: DISCONTINUED | OUTPATIENT
Start: 2018-06-01 | End: 2018-06-01 | Stop reason: HOSPADM

## 2018-06-01 RX ORDER — HYDRALAZINE HYDROCHLORIDE 20 MG/ML
5 INJECTION INTRAMUSCULAR; INTRAVENOUS
Status: DISCONTINUED | OUTPATIENT
Start: 2018-06-01 | End: 2018-06-01 | Stop reason: HOSPADM

## 2018-06-01 RX ORDER — MIDAZOLAM HYDROCHLORIDE 1 MG/ML
1 INJECTION INTRAMUSCULAR; INTRAVENOUS
Status: DISCONTINUED | OUTPATIENT
Start: 2018-06-01 | End: 2018-06-01 | Stop reason: HOSPADM

## 2018-06-01 RX ORDER — MAGNESIUM HYDROXIDE 1200 MG/15ML
LIQUID ORAL AS NEEDED
Status: DISCONTINUED | OUTPATIENT
Start: 2018-06-01 | End: 2018-06-01 | Stop reason: HOSPADM

## 2018-06-01 RX ORDER — ONDANSETRON 4 MG/1
4 TABLET, FILM COATED ORAL EVERY 6 HOURS PRN
Qty: 10 TABLET | Refills: 0 | Status: SHIPPED | OUTPATIENT
Start: 2018-06-01 | End: 2018-06-15

## 2018-06-01 RX ORDER — ALBUTEROL SULFATE 2.5 MG/3ML
2.5 SOLUTION RESPIRATORY (INHALATION)
Status: DISCONTINUED | OUTPATIENT
Start: 2018-06-01 | End: 2018-06-01 | Stop reason: HOSPADM

## 2018-06-01 RX ORDER — PANTOPRAZOLE SODIUM 40 MG/1
40 TABLET, DELAYED RELEASE ORAL DAILY
Qty: 14 TABLET | Refills: 0 | Status: SHIPPED | OUTPATIENT
Start: 2018-06-01 | End: 2018-06-15

## 2018-06-01 RX ORDER — LIDOCAINE HYDROCHLORIDE 10 MG/ML
0.5 INJECTION, SOLUTION EPIDURAL; INFILTRATION; INTRACAUDAL; PERINEURAL ONCE AS NEEDED
Status: DISCONTINUED | OUTPATIENT
Start: 2018-06-01 | End: 2018-06-01 | Stop reason: HOSPADM

## 2018-06-01 RX ORDER — ONDANSETRON 2 MG/ML
4 INJECTION INTRAMUSCULAR; INTRAVENOUS ONCE AS NEEDED
Status: COMPLETED | OUTPATIENT
Start: 2018-06-01 | End: 2018-06-01

## 2018-06-01 RX ORDER — SODIUM CHLORIDE, SODIUM LACTATE, POTASSIUM CHLORIDE, CALCIUM CHLORIDE 600; 310; 30; 20 MG/100ML; MG/100ML; MG/100ML; MG/100ML
9 INJECTION, SOLUTION INTRAVENOUS CONTINUOUS
Status: DISCONTINUED | OUTPATIENT
Start: 2018-06-01 | End: 2018-06-01 | Stop reason: HOSPADM

## 2018-06-01 RX ORDER — ONDANSETRON 2 MG/ML
4 INJECTION INTRAMUSCULAR; INTRAVENOUS EVERY 6 HOURS PRN
Status: DISCONTINUED | OUTPATIENT
Start: 2018-06-01 | End: 2018-06-01 | Stop reason: HOSPADM

## 2018-06-01 RX ORDER — HYDROCODONE BITARTRATE AND ACETAMINOPHEN 7.5; 325 MG/1; MG/1
2 TABLET ORAL EVERY 4 HOURS PRN
Status: DISCONTINUED | OUTPATIENT
Start: 2018-06-01 | End: 2018-06-01 | Stop reason: HOSPADM

## 2018-06-01 RX ORDER — FLUMAZENIL 0.1 MG/ML
0.2 INJECTION INTRAVENOUS AS NEEDED
Status: DISCONTINUED | OUTPATIENT
Start: 2018-06-01 | End: 2018-06-01 | Stop reason: HOSPADM

## 2018-06-01 RX ORDER — MIDAZOLAM HYDROCHLORIDE 1 MG/ML
2 INJECTION INTRAMUSCULAR; INTRAVENOUS
Status: DISCONTINUED | OUTPATIENT
Start: 2018-06-01 | End: 2018-06-01 | Stop reason: HOSPADM

## 2018-06-01 RX ORDER — ROCURONIUM BROMIDE 10 MG/ML
INJECTION, SOLUTION INTRAVENOUS AS NEEDED
Status: DISCONTINUED | OUTPATIENT
Start: 2018-06-01 | End: 2018-06-01 | Stop reason: SURG

## 2018-06-01 RX ORDER — ASPIRIN 325 MG
325 TABLET, DELAYED RELEASE (ENTERIC COATED) ORAL EVERY 12 HOURS SCHEDULED
Status: DISCONTINUED | OUTPATIENT
Start: 2018-06-02 | End: 2018-06-01 | Stop reason: HOSPADM

## 2018-06-01 RX ORDER — HYDROCODONE BITARTRATE AND ACETAMINOPHEN 7.5; 325 MG/1; MG/1
TABLET ORAL
Status: COMPLETED
Start: 2018-06-01 | End: 2018-06-01

## 2018-06-01 RX ORDER — SODIUM CHLORIDE 0.9 % (FLUSH) 0.9 %
1-10 SYRINGE (ML) INJECTION AS NEEDED
Status: DISCONTINUED | OUTPATIENT
Start: 2018-06-01 | End: 2018-06-01 | Stop reason: HOSPADM

## 2018-06-01 RX ORDER — PANTOPRAZOLE SODIUM 40 MG/1
40 TABLET, DELAYED RELEASE ORAL
Status: DISCONTINUED | OUTPATIENT
Start: 2018-06-01 | End: 2018-06-01 | Stop reason: HOSPADM

## 2018-06-01 RX ORDER — ONDANSETRON 4 MG/1
4 TABLET, ORALLY DISINTEGRATING ORAL EVERY 6 HOURS PRN
Status: DISCONTINUED | OUTPATIENT
Start: 2018-06-01 | End: 2018-06-01 | Stop reason: HOSPADM

## 2018-06-01 RX ORDER — FLUOXETINE 10 MG/1
10 CAPSULE ORAL DAILY
Status: DISCONTINUED | OUTPATIENT
Start: 2018-06-01 | End: 2018-06-01 | Stop reason: HOSPADM

## 2018-06-01 RX ORDER — HYDROMORPHONE HCL 110MG/55ML
PATIENT CONTROLLED ANALGESIA SYRINGE INTRAVENOUS AS NEEDED
Status: DISCONTINUED | OUTPATIENT
Start: 2018-06-01 | End: 2018-06-01 | Stop reason: SURG

## 2018-06-01 RX ORDER — DOCUSATE SODIUM 100 MG/1
100 CAPSULE, LIQUID FILLED ORAL 2 TIMES DAILY
Status: DISCONTINUED | OUTPATIENT
Start: 2018-06-01 | End: 2018-06-01 | Stop reason: HOSPADM

## 2018-06-01 RX ORDER — BUDESONIDE AND FORMOTEROL FUMARATE DIHYDRATE 80; 4.5 UG/1; UG/1
2 AEROSOL RESPIRATORY (INHALATION)
Status: DISCONTINUED | OUTPATIENT
Start: 2018-06-01 | End: 2018-06-01 | Stop reason: HOSPADM

## 2018-06-01 RX ORDER — PROMETHAZINE HYDROCHLORIDE 25 MG/1
12.5 TABLET ORAL ONCE AS NEEDED
Status: DISCONTINUED | OUTPATIENT
Start: 2018-06-01 | End: 2018-06-01 | Stop reason: HOSPADM

## 2018-06-01 RX ORDER — ONDANSETRON 4 MG/1
4 TABLET, FILM COATED ORAL EVERY 6 HOURS PRN
Status: DISCONTINUED | OUTPATIENT
Start: 2018-06-01 | End: 2018-06-01 | Stop reason: HOSPADM

## 2018-06-01 RX ORDER — KETOROLAC TROMETHAMINE 30 MG/ML
30 INJECTION, SOLUTION INTRAMUSCULAR; INTRAVENOUS EVERY 8 HOURS
Status: DISCONTINUED | OUTPATIENT
Start: 2018-06-01 | End: 2018-06-01 | Stop reason: HOSPADM

## 2018-06-01 RX ORDER — MELOXICAM 15 MG/1
15 TABLET ORAL DAILY
Qty: 30 TABLET | Refills: 0 | Status: SHIPPED | OUTPATIENT
Start: 2018-06-01 | End: 2018-07-01

## 2018-06-01 RX ORDER — TRANEXAMIC ACID 100 MG/ML
INJECTION, SOLUTION INTRAVENOUS AS NEEDED
Status: DISCONTINUED | OUTPATIENT
Start: 2018-06-01 | End: 2018-06-01 | Stop reason: SURG

## 2018-06-01 RX ORDER — PROPOFOL 10 MG/ML
VIAL (ML) INTRAVENOUS AS NEEDED
Status: DISCONTINUED | OUTPATIENT
Start: 2018-06-01 | End: 2018-06-01 | Stop reason: SURG

## 2018-06-01 RX ADMIN — SUGAMMADEX 150 MG: 100 INJECTION, SOLUTION INTRAVENOUS at 08:23

## 2018-06-01 RX ADMIN — FENTANYL CITRATE 100 MCG: 50 INJECTION INTRAMUSCULAR; INTRAVENOUS at 07:02

## 2018-06-01 RX ADMIN — PROPOFOL 120 MG: 10 INJECTION, EMULSION INTRAVENOUS at 07:03

## 2018-06-01 RX ADMIN — ONDANSETRON 4 MG: 2 INJECTION INTRAMUSCULAR; INTRAVENOUS at 08:04

## 2018-06-01 RX ADMIN — MELOXICAM 15 MG: 15 TABLET ORAL at 05:36

## 2018-06-01 RX ADMIN — TRANEXAMIC ACID 1000 MG: 100 INJECTION, SOLUTION INTRAVENOUS at 07:17

## 2018-06-01 RX ADMIN — SODIUM CHLORIDE, POTASSIUM CHLORIDE, SODIUM LACTATE AND CALCIUM CHLORIDE: 600; 310; 30; 20 INJECTION, SOLUTION INTRAVENOUS at 08:25

## 2018-06-01 RX ADMIN — ROCURONIUM BROMIDE 40 MG: 10 INJECTION INTRAVENOUS at 07:03

## 2018-06-01 RX ADMIN — MIDAZOLAM 2 MG: 1 INJECTION INTRAMUSCULAR; INTRAVENOUS at 06:43

## 2018-06-01 RX ADMIN — KETOROLAC TROMETHAMINE 30 MG: 30 INJECTION, SOLUTION INTRAMUSCULAR at 09:35

## 2018-06-01 RX ADMIN — HYDROMORPHONE HYDROCHLORIDE 0.25 MG: 2 INJECTION INTRAMUSCULAR; INTRAVENOUS; SUBCUTANEOUS at 08:30

## 2018-06-01 RX ADMIN — HYDROCODONE BITARTRATE AND ACETAMINOPHEN 1 TABLET: 7.5; 325 TABLET ORAL at 11:42

## 2018-06-01 RX ADMIN — FENTANYL CITRATE 50 MCG: 50 INJECTION INTRAMUSCULAR; INTRAVENOUS at 06:43

## 2018-06-01 RX ADMIN — ROPIVACAINE HYDROCHLORIDE 150 MG: 5 INJECTION, SOLUTION EPIDURAL; INFILTRATION; PERINEURAL at 06:49

## 2018-06-01 RX ADMIN — ONDANSETRON 4 MG: 2 INJECTION INTRAMUSCULAR; INTRAVENOUS at 09:40

## 2018-06-01 RX ADMIN — SODIUM CHLORIDE, POTASSIUM CHLORIDE, SODIUM LACTATE AND CALCIUM CHLORIDE 9 ML/HR: 600; 310; 30; 20 INJECTION, SOLUTION INTRAVENOUS at 06:44

## 2018-06-01 RX ADMIN — PROMETHAZINE HYDROCHLORIDE 12.5 MG: 25 INJECTION INTRAMUSCULAR; INTRAVENOUS at 10:20

## 2018-06-01 RX ADMIN — PREGABALIN 150 MG: 75 CAPSULE ORAL at 05:36

## 2018-06-01 RX ADMIN — FAMOTIDINE 20 MG: 10 INJECTION, SOLUTION INTRAVENOUS at 06:43

## 2018-06-01 RX ADMIN — VANCOMYCIN HYDROCHLORIDE 1250 MG: 10 INJECTION, POWDER, LYOPHILIZED, FOR SOLUTION INTRAVENOUS at 06:09

## 2018-06-01 RX ADMIN — CLINDAMYCIN PHOSPHATE 900 MG: 18 INJECTION, SOLUTION INTRAVENOUS at 12:56

## 2018-06-01 RX ADMIN — SODIUM CHLORIDE, POTASSIUM CHLORIDE, SODIUM LACTATE AND CALCIUM CHLORIDE 500 ML: 600; 310; 30; 20 INJECTION, SOLUTION INTRAVENOUS at 05:36

## 2018-06-01 RX ADMIN — HYDROMORPHONE HYDROCHLORIDE 0.25 MG: 2 INJECTION INTRAMUSCULAR; INTRAVENOUS; SUBCUTANEOUS at 08:22

## 2018-06-01 NOTE — NURSING NOTE
"Notified by SACHI Hearn that Dr. Parekh would like to re-take left knee x-ray, MD requests \"true AP x-ray\".  "

## 2018-06-01 NOTE — DISCHARGE INSTRUCTIONS
I. ACTIVITIES:    1. Exercises:  ? Complete exercise program as taught by the hospital physical therapist 2 times per day  ? Exercise program will be advanced by the physical therapist  ? During the day be up ambulating every 2 hours (while awake) for short distances  ? Complete the ankle pump exercises at least 10 times per hour (while awake)  ? Elevate legs most of the day the first week post operatively and thereafter elevate legs when in bed and for at least 30 minutes during the day. Caution must be taken to avoid pillow placement under the bend of the knee as this can led to flexion contractures of the knee.  ? Use cold packs 20-30 minutes approximately 5 times per day. This should be done before and after completing your exercises and at any time you are experiencing pain/ stiffness in your operative extremity.    2. Activities of Daily Living:  ? No tub baths, hot tubs, or swimming pools for 4 weeks  ? May shower and let water run over the incision on post-operative day #7 if no drainage. Do not scrub or rub the incision. Simply let the water run over the incision and pat dry.    II. Precautions:  ? Everyone that comes near you should wash their hands  ? No elective dental, genital-urinary, or colon procedures or surgical procedures for 12 weeks after surgery unless absolutely necessary.  ? If dental work or surgical procedure is deemed absolutely necessary during the first 12 weeks, you will need to contact your surgeon as you will need to take antibiotics 1 hour prior to any dental work (including teeth cleanings).  ? Please discuss with your surgeon prophylactic antibiotics as the length of time this intervention will be necessary for you varies with each patient’s health history and situation.  ? Avoid sick people. If you must be around someone who is ill, they should wear a mask.  ? Avoid visits to the Emergency Room or Urgent Care unless you are having a life threatening event.     III. INCISION  CARE:  ? Wash your hands prior to dressing changes  ? No creams or ointments to the incision  ? Do not touch or pick at the incision  ? Check incision every day and notify surgeon immediately if any of the following signs or symptoms are noted:  o Increase in redness  o Increase in swelling around the incision and of the entire extremity  o Increase in pain  o Drainage oozing from the incision  o Pulling apart of the edges of the incision  o Increase in overall body temperature (greater than 100.5 degrees)  ? Your surgeon will instruct you regarding suture or staple removal    IV. Medications:     1. Anticoagulants: You will be discharged on an anticoagulant. This is a prophylactic medication that helps prevent blood clots during your post-operative period. The type and length of dosage varies based on your individual needs, procedure performed, and surgeon’s preference.    ? While taking the anticoagulant, you should avoid taking any additional aspirin, ibuprofen (Advil or Motrin), Aleve (Naprosyn) or other non-steroidal anti-inflammatory medications.   ? Notify surgeon immediately if any jamaal bleeding is noted in the urine, stool, emesis, or from the nose or the incision. Blood in the stool will often appear as black rather than red. Blood in urine may appear as pink. Blood in emesis may appear as brown/black like coffee grounds.  ? You will need to apply pressure for longer periods of time to any cuts or abrasions to stop bleeding  ? Avoid alcohol while taking anticoagulants    2. Stool Softeners: You will be at greater risk of constipation after surgery due to being less mobile and the pain medications.     ? Take stool softeners as instructed by your surgeon while on pain medications. Bran cereal is most effective. Over the counter Colace 100 mg 1-2 capsules twice daily.   ? Drink plenty of fluids, and eat fruits and vegetables during your recovery time    3. Pain Medications utilized after surgery are  narcotics and the law requires that the following information be given to all patients that are prescribed narcotics:    ? CLASSIFICATION: Pain medications are called Opioids and are narcotics  ? LEGALITIES: It is illegal to share narcotics with others and to drive within 24 hours of taking narcotics  ? POTENTIAL SIDE EFFECTS: Potential side effects of opioids include: nausea, vomiting, itching, dizziness, drowsiness, dry mouth, constipation, and difficulty urinating.  ? POTENTIAL ADVERSE EFFECTS:   o Opioid tolerance can develop with use of pain medications and this simply means that it requires more and more of the medication to control pain; however, this is seen more in patients that use opioids for longer periods of time.  o Opioid dependence can develop with use of Opioids and this simply means that to stop the medication can cause withdrawal symptoms; however, this is seen with patients that use Opioids for longer periods of time.  o Opioid addiction can develop with use of Opioids and the incidence of this is very unlikely in patients who take the medications as ordered and stop the medications as instructed.  o Opioid overdose can be dangerous, but is unlikely when the medication is taken as ordered and stopped when ordered. It is important not to mix opioids with alcohol or with and type of sedative such as Benadryl as this can lead to over sedation and respiratory difficulty.  ? DOSAGE:   o Pain medications will need to be taken consistently for the first week to decrease pain and promote adequate pain relief and participation in physical therapy.  o After the initial surgical pain begins to resolve, you may begin to decrease the pain medication. By the end of 6-8 weeks, you should be off of pain medications.  o Refills will not be given by the office during evening hours, on weekends, or after 6-8 weeks post-op.  o To seek refills on pain medications during the initial 6 week post-operative period, you  must call the office 48 hours in advance to request the refill. The office will then notify you when to  the prescription. DO NOT wait until you are out of the medication to request a refill.    V. FOLLOW-UP VISITS:  ? You will need to follow up in the office with your surgeon in 2 weeks. Please call this number [phone] to schedule this appointment.  If you have any concerns or suspected complications prior to your follow up visit, please call your surgeons office. Do not wait until your appointment time if you suspect complications. These will need to be addressed in the office promptly.

## 2018-06-01 NOTE — THERAPY DISCHARGE NOTE
Acute Care - Physical Therapy Initial Eval/Discharge  [facility]     Patient Name: Namrata Luz  : 1955  MRN: [id number]  Today's Date: 2018                Admit Date: 2018    Visit Dx:    ICD-10-CM ICD-9-CM   1. Chronic pain of left knee M25.562 719.46    G89.29 338.29     Patient Active Problem List   Diagnosis   • Abnormal liver function tests   • Anxiety   • Asthma   • Mixed anxiety depressive disorder   • Headache   • Hyperlipidemia   • Hypertension   • Hypothyroidism   • Lung nodule, solitary   • Neck pain   • Psoriasis   • Vitamin D deficiency   • Right medial knee pain   • Effusion of right knee   • Choking   • Type 2 diabetes mellitus without complication   • Hoarseness   • Chronic pain of left knee     Past Medical History:   Diagnosis Date   • Abnormal LFTs    • Allergic rhinitis    • Anxiety    • Arthritis    • Asthma    • Depression    • Diabetes mellitus     PRE DIABETES   • Disease of thyroid gland    • Endometriosis    • Fatty liver    • Fractures    • Hyperlipidemia    • Hypertension     ON TOPROL FOR MVP   • Limited joint range of motion     LT KNEE   • Lung nodule    • Mitral valve prolapse    • Prediabetes    • Psoriasis    • Renal insufficiency    • Sleep apnea     USES C-PAP   • Tachycardia    • Vitamin D deficiency      Past Surgical History:   Procedure Laterality Date   • BACK SURGERY     • CATARACT EXTRACTION, BILATERAL Bilateral    • CHOLECYSTECTOMY  'S   • HYSTERECTOMY     • LUMBAR DISCECTOMY     • TIBIA FASCIOTOMY Left    • TUBAL ABDOMINAL LIGATION Bilateral           PT ASSESSMENT (last 12 hours)      Physical Therapy Evaluation     Row Name 18 1240          PT Evaluation Time/Intention    Subjective Information complains of;dizziness  -RD     Document Type evaluation  -RD     Patient Effort excellent  -RD     Symptoms Noted During/After Treatment dizziness  -RD     Row Name 18 1240          General Information    Prior  Level of Function independent:;community mobility  -RD     Equipment Currently Used at Home walker, rolling  -RD     Row Name 06/01/18 1240          Cognitive Assessment/Intervention- PT/OT    Orientation Status (Cognition) oriented x 4  -RD     Follows Commands (Cognition) WNL  -RD     Row Name 06/01/18 1240          Mobility Assessment/Treatment    Extremity Weight-bearing Status left lower extremity  -RD     Left Lower Extremity (Weight-bearing Status) weight-bearing as tolerated (WBAT)  -RD     Row Name 06/01/18 1240          Bed Mobility Assessment/Treatment    Bed Mobility Assessment/Treatment supine-sit-supine  -RD     Supine-Sit-Supine Iroquois (Bed Mobility) independent  -RD     Row Name 06/01/18 1240          Transfer Assessment/Treatment    Transfer Assessment/Treatment sit-stand transfer;stand-sit transfer  -RD     Sit-Stand Iroquois (Transfers) contact guard  -RD     Stand-Sit [place]quois (Transfers) contact guard  -RD     Row Name 06/01/18 1240          Sit-Stand Transfer    Assistive Device (Sit-Stand Transfers) walker, front-wheeled  -RD     Row Name 06/01/18 1240          Stand-Sit Transfer    Assistive Device (Stand-Sit Transfers) walker, front-wheeled  -RD     Row Name 06/01/18 1240          Gait/Stairs Assessment/Training    Iroquois Level (Gait) contact guard  -RD     Assistive Device (Gait) walker, front-wheeled  -RD     Distance in Feet (Gait) 40  -RD     Pattern (Gait) 3-point;step-to  -RD     Deviations/Abnormal Patterns (Gait) antalgic;base of support, wide  -RD     Left Sided Gait Deviations heel strike decreased  -RD     Row Name 06/01/18 1240          General ROM    GENERAL ROM COMMENTS l knee -10-90  -RD     Row Name 06/01/18 1240          General Assessment (Manual Muscle Testing)    Comment, General Manual Muscle Testing (MMT) Assessment l knee 3+/5  -RD     Row Name 06/01/18 1240          Motor Assessment/Intervention    Additional Documentation --   10 reps tkr protocol   -RD     Row Name 06/01/18 1240          Pain Assessment    Additional Documentation Pain Scale: Numbers Pre/Post-Treatment (Group)  -RD     Row Name 06/01/18 1240          Pain Scale: Numbers Pre/Post-Treatment    Pain Scale: Numbers, Pretreatment 4/10  -RD     Pain Scale: Numbers, Post-Treatment 4/10  -RD     Pain Location knee  -RD     Pain Intervention(s) Cold applied  -RD     Row Name             Wound 06/01/18 0743 Left knee incision    Wound - Properties Group Date first assessed: 06/01/18  -RG Time first assessed: 0743  -RG Side: Left  -RG Location: knee  -RG Type: incision  -RG    Row Name 06/01/18 1240          Positioning and Restraints    Pre-Treatment Position in bed  -RD     Post Treatment Position bed  -RD     In Bed supine;with nsg  -RD     Row Name 06/01/18 1240          Living Environment    Home Accessibility wheelchair accessible  -RD       User Key  (r) = Recorded By, (t) = Taken By, (c) = Cosigned By    Initials Name Provider Type    RD Velma Larkin, PT Physical Therapist    CESIA Looney RN Registered Nurse          Physical Therapy Education     Title: PT OT SLP Therapies (Done)     Topic: Physical Therapy (Done)     Point: Mobility training (Done)    Learning Progress Summary     Learner Status Readiness Method Response Comment Documented by    Patient Done Acceptance HOMER JAMES DU  RD 06/01/18 1405          Point: Home exercise program (Done)    Learning Progress Summary     Learner Status Readiness Method Response Comment Documented by    Patient Done Acceptance HOMER JAMES DU  RD 06/01/18 1405                      User Key     Initials Effective Dates Name Provider Type Discipline    RD 04/03/18 -  Velma Larkin, PT Physical Therapist PT                PT Recommendation and Plan  Anticipated Discharge Disposition (PT): home with home health  Outcome Summary/Treatment Plan (PT)  Anticipated Discharge Disposition (PT): home with home health  Outcome Summary: patient demonstrated  safe ambulation and was indep with HEP.  Pt was dizzy with therapy which limited gait distance but once resolved, pt will be ready for dc to home.            Outcome Measures     Row Name 06/01/18 1400             How much help from another person do you currently need...    Turning from your back to your side while in flat bed without using bedrails? 4  -RD      Moving from lying on back to sitting on the side of a flat bed without bedrails? 4  -RD      Moving to and from a bed to a chair (including a wheelchair)? 4  -RD      Standing up from a chair using your arms (e.g., wheelchair, bedside chair)? 3  -RD      Climbing 3-5 steps with a railing? 3  -RD      To walk in hospital room? 3  -RD      AM-PAC 6 Clicks Score 21  -RD         Functional Assessment    Outcome Measure Options AM-PAC 6 Clicks Basic Mobility (PT)  -RD        User Key  (r) = Recorded By, (t) = Taken By, (c) = Cosigned By    Initials Name Provider Type    NINI Larkin PT Physical Therapist           Time Calculation:         PT Charges     Row Name 06/01/18 1407             Time Calculation    Start Time 1220  -RD      Stop Time 1240  -RD      Time Calculation (min) 20 min  -RD      PT Received On 06/01/18  -RD        User Key  (r) = Recorded By, (t) = Taken By, (c) = Cosigned By    Initials Name Provider Type    NINI Larkin PT Physical Therapist          Therapy Charges for Today     Code Description Service Date Service Provider Modifiers Qty    95707545236 HC PT EVAL LOW COMPLEXITY 1 6/1/2018 Velma Larkin, PT GP 1    72155507847 HC PT THER PROC EA 15 MIN 6/1/2018 Velma Larkin, PT GP 1          PT G-Codes  Outcome Measure Options: AM-PAC 6 Clicks Basic Mobility (PT)    PT Discharge Summary  Anticipated Discharge Disposition (PT): home with home health  Reason for Discharge: Discharge from facility    Velma Larkin, JULIA  6/1/2018

## 2018-06-01 NOTE — ADDENDUM NOTE
Addendum  created 06/01/18 3978 by Bhavya Dominguez CRNA    Visit Navigator Flowsheet section accepted

## 2018-06-01 NOTE — ANESTHESIA POSTPROCEDURE EVALUATION
Patient: Namrata Luz    Procedure Summary     Date:  06/01/18 Room / Location:  [facility] OR [address] [place] [address] MAIN OR    Anesthesia Start:  0655 Anesthesia Stop:  0844    Procedure:  KNEE ARTHROPLASTY UNICOMPARTMENTAL (Left Knee) Diagnosis:       Chronic pain of left knee      (Chronic pain of left knee [M25.562, G89.29])    Surgeon:  Emanuel Jack MD Provider:  Herlinda Alexandra MD    Anesthesia Type:  general ASA Status:  3          Anesthesia Type: general  Last vitals  BP   123/68 (06/01/18 1110)   Temp   36.4 °C (97.6 °F) (06/01/18 1110)   Pulse   59 (06/01/18 1110)   Resp   19 (06/01/18 1110)     SpO2   98 % (06/01/18 1110)     Post Anesthesia Care and Evaluation    Patient location during evaluation: bedside  Patient participation: complete - patient participated  Level of consciousness: awake and alert  Pain management: adequate  Airway patency: patent  Anesthetic complications: No anesthetic complications    Cardiovascular status: acceptable  Respiratory status: acceptable  Hydration status: acceptable    Comments: /68 (BP Location: Left arm, Patient Position: Lying)   Pulse 59   Temp 36.4 °C (97.6 °F) (Oral)   Resp 19   SpO2 98%

## 2018-06-01 NOTE — OP NOTE
Name: Namrata Luz  YOB: 1955    DATE OF SURGERY: 6/1/2018    PREOPERATIVE DIAGNOSIS: Left knee end-stage medial compartment osteoarthritis    POSTOPERATIVE DIAGNOSIS: Left knee end-stage compartment osteoarthritis    PROCEDURE PERFORMED: Left unicompartmental knee replacement    SURGEON: Emanuel Jack M.D.    ASSISTANT: KASEY POWER    IMPLANTS:     Implant Name Type Inv. Item Serial No.  Lot No. LRB No. Used   CMT BONE PALACOS R HI/VISC 1X40 - [id number] Implant CMT BONE PALACOS R HI/VISC 1X40  [facility] [id number] Left 1   COMP TIB UNI HIFLX PCOAT LT/M RT/L  SZ3 - [id number] Implant COMP TIB UNI HIFLX PCOAT LT/M RT/L  SZ3  [employer] [id number] Left 1   COMP FEM UNI HIFLX PCOAT LM RL SZD - [id number] Implant COMP FEM UNI HIFLX PCOAT LM RL SZD  [employer] [id number] Left 1   ART/SRF KN UNICOMPT HXPE SZ3 9MM - [id number] Implant ART/SRF KN UNICOMPT HXPE SZ3 9MM   [employer] [id number] Left 1       Estimated Blood Loss: 100cc  Specimens : none  Complications: none    DESCRIPTION OF PROCEDURE: The patient was taken to the operating room and placed in the supine position. A sequential compression device was carefully placed on the non-operative leg. Preoperative antibiotics were administered. Surgical time out was performed. After adequate induction of anesthesia, the leg was prepped and draped in the usual sterile fashion, exsanguinated with an Esmarch bandage and the tourniquet inflated to 250 mmHg. A slightly medial to midline incision was performed followed by a mini-midvastus arthrotomy. The patella was partially subluxed laterally.  I then carefully examined all 3 compartments of the knee.  There was end-stage medial compartment osteoarthritis with bone-on-bone articulation.  The ACL was normal.  The patellofemoral joint was normal.  The lateral joint was normal.  I then used the tibial extramedullary cutting guide.  It was set with the 4 mm stylus to remove 4 mm of medial  bone at the thickest point.  The cutting guide was pinned in place and the tibial cut was performed.  The tibial cut was then removed.  We then used the flexion extension blocks to carefully examine the balance between flexion and extension.  At this point the block was placed with the leg in full extension and the distal femoral cutting block was placed.  The distal femoral cut was performed.  We then flexed the knee to roughly 90° and again checked the balance in flexion and extension.  The knee was then balanced.  We then sized the femoral component at 90° of flexion the appropriate cutting guide was pinned in place.  All femoral cuts were then performed.    The medial meniscus and remnants of remnants of excessive posterior capsule were excised.  We then again flexed the knee and sized the tibial cut surface.  The tibial trial was then placed and the fixation lugs were drilled.  The trial components were then placed.  The trial spacer was then placed.  The knee was taken through a full range of motion.  The knee was nicely balanced and the 2 mm harmony easily fit in both flexion and extension with equal balance.  The trial components were then removed the knee was copiously irrigated with pulsed lavage and then injected with anesthetic cocktail.  The cut surfaces were then dried with clean laparotomy sponges.  The components were then cemented tibia followed by femur.  The excess cement was removed and the knee was held in full extension with a 2 mm harmony in place.  After the cement had completely hardened we again copiously irrigated the knee and chose the final polyethylene insert which was equal to the trial which gave nice balance and good soft tissue tension without being overly tight.  The knee was then closed in multiple layers in standard fashion and the patient was extubated and transferred to the PACU for recovery from anesthesia.  At the end of the case, the sponge and needle counts were reported as being  correct. There were no known complications. The patient was then transported to the recovery room.      Emanuel Jack MD  6/1/2018

## 2018-06-01 NOTE — PLAN OF CARE
Problem: Patient Care Overview  Goal: Plan of Care Review   06/01/18 8967   OTHER   Outcome Summary patient demonstrated safe ambulation and was indep with HEP. Pt was dizzy with therapy which limited gait distance but once resolved, pt will be ready for dc to home.

## 2018-06-01 NOTE — ANESTHESIA PREPROCEDURE EVALUATION
Anesthesia Evaluation     Patient summary reviewed   NPO Solid Status: > 8 hours  NPO Liquid Status: > 6 hours           Airway   Mallampati: II  TM distance: >3 FB  Dental      Pulmonary    (+) asthma, sleep apnea,   Cardiovascular     ECG reviewed  Rhythm: regular  Rate: normal    (+) hypertension, hyperlipidemia,       Neuro/Psych  GI/Hepatic/Renal/Endo    (+)   diabetes mellitus type 2, hypothyroidism,     Musculoskeletal     Abdominal    Substance History      OB/GYN          Other                        Anesthesia Plan    ASA 3     general     intravenous induction   Anesthetic plan and risks discussed with patient.

## 2018-06-01 NOTE — ANESTHESIA PROCEDURE NOTES
Peripheral Block    Patient location during procedure: holding area  Start time: 6/1/2018 6:45 AM  Stop time: 6/1/2018 6:49 AM  Reason for block: at surgeon's request and post-op pain management  Performed by  Anesthesiologist: DILCIA STAFFORD  Preanesthetic Checklist  Completed: patient identified, site marked, surgical consent, pre-op evaluation, timeout performed, IV checked, risks and benefits discussed and monitors and equipment checked  Prep:  Pt Position: supine  Sterile barriers:gloves  Prep: ChloraPrep  Patient monitoring: blood pressure monitoring, continuous pulse oximetry and EKG  Procedure  Sedation:yes  Performed under: local infiltration  Guidance:ultrasound guided  ULTRASOUND INTERPRETATION. Using ultrasound guidance a 20 G gauge needle was placed in close proximity to the nerve, at which point, under ultrasound guidance anesthetic was injected in the area of the nerve and spread of the anesthesia was seen on ultrasound in close proximity thereto.  There were no abnormalities seen on ultrasound; a digital image was taken; and the patient tolerated the procedure with no complications. Images:still images obtained    Laterality:left  Block Type:adductor canal block    Medications  Local Injected:ropivacaine 0.5% Local Amount Injected:30mL  Post Assessment  Injection Assessment: negative aspiration for heme, no paresthesia on injection and incremental injection  Patient Tolerance:comfortable throughout block  Complications:no

## 2018-06-01 NOTE — ANESTHESIA PROCEDURE NOTES
Airway  Urgency: elective    Date/Time: 6/1/2018 7:08 AM  Airway not difficult    General Information and Staff    Patient location during procedure: OR  Anesthesiologist: DILCIA STAFFORD  CRNA: MARIA T ADKINS    Indications and Patient Condition  Indications for airway management: airway protection    Preoxygenated: yes  Mask difficulty assessment: 1 - vent by mask    Final Airway Details  Final airway type: endotracheal airway      Successful airway: ETT  Cuffed: yes   Successful intubation technique: direct laryngoscopy  Facilitating devices/methods: Bougie  Endotracheal tube insertion site: oral  Blade: Eli  Blade size: #3  ETT size: 7.0 mm  Cormack-Lehane Classification: grade III - view of epiglottis only  Placement verified by: chest auscultation and capnometry   Measured from: lips  ETT to lips (cm): 20  Number of attempts at approach: 1    Additional Comments  Smooth IV induction. Trachea intubated. Cuff up. Ett secured. BEBS. Dentition intact without injury.

## 2018-06-01 NOTE — H&P (VIEW-ONLY)
Patient: Namrata Luz    Date of Admission: 6/1/18    YOB: 1955    Medical Record Number: [id number]    Admitting Physician: Dr. Emanuel Jack    Reason for Admission: End Stage Left Medial Knee OA    History of Present Illness: 62 y.o. female presents with severe end stage medial compartment knee osteoarthritis which has not been responsive to the full compliment of conservative measures. Despite conservative attempts, there is still severe, constant activity limiting pain. Given the severity of the pain, the patient has elected to proceed with knee replacement.    Allergies:   Allergies   Allergen Reactions   • Morphine And Related Hives   • Penicillins Hives         Current Medications:  Scheduled Meds:  PRN Meds:.    PMH:  Past Medical History:   Diagnosis Date   • Abnormal LFTs    • Allergic rhinitis    • Anxiety    • Arthritis    • Asthma    • Depression    • Diabetes mellitus     PRE DIABETES   • Disease of thyroid gland    • Endometriosis    • Fatty liver    • Fractures 2007   • Hyperlipidemia    • Hypertension     ON TOPROL FOR MVP   • Limited joint range of motion     LT KNEE   • Lung nodule    • Mitral valve prolapse    • Prediabetes    • Psoriasis    • Renal insufficiency    • Sleep apnea     USES C-PAP   • Tachycardia    • Vitamin D deficiency        PSurg/Soc/Fam Hx:     Past Surgical History:   Procedure Laterality Date   • BACK SURGERY  1982   • CATARACT EXTRACTION, BILATERAL Bilateral 2015   • CHOLECYSTECTOMY  2000'S   • HYSTERECTOMY  1982   • LUMBAR DISCECTOMY  1982   • TIBIA FASCIOTOMY Left 2006   • TUBAL ABDOMINAL LIGATION Bilateral 1981        Social History     Occupational History   • Not on file.     Social History Main Topics   • Smoking status: Never Smoker   • Smokeless tobacco: Never Used      Comment: lots of second hand smoke during childhood   • Alcohol use Yes     0 Standard drinks or equivalent per week      Comment: maybe have 1 drink every couple of months   • Drug  "use: No   • Sexual activity: Not Currently     Partners: Male     Birth control/ protection: Post-menopausal      Social History     Social History Narrative   • No narrative on file        Family History   Problem Relation Age of Onset   • Vaginal cancer Mother    • Lung cancer Mother    • Arthritis Mother    • Diabetes Mother            • Cancer Mother    • Coronary artery disease Father    • Arthritis Father    • Kidney cancer Father    • Cancer Father    • Coronary artery disease Brother    • Asthma Sister    • Osteoporosis Sister    • Osteoporosis Maternal Grandmother    • Osteoporosis Sister    • Malig Hyperthermia Neg Hx          Review of Systems:   A 14 point review of systems was performed, pertinent positives discussed above, all other systems are negative    Physical Exam: 62 y.o. female  Vital Signs :   Vitals:    18 1414   BP: 130/62   BP Location: Left arm   Temp: 98.7 °F (37.1 °C)   TempSrc: Temporal Artery    Weight: 93.9 kg (207 lb)   Height: 172.7 cm (68\")     General: Alert and Oriented x 3, No acute distress.  Psych: mood and affect appropriate; recent and remote memory intact  Eyes: conjunctiva clear; pupils equally round and reactive, sclera nonicteric  CV: no peripheral edema  Resp: normal respiratory effort  Skin: no rashes or wounds; normal turgor  Musculosketetal: pain localized to the medial aspect of the knee. Neutral alignment in stance. No patellofemoral crepitance or pain with patellofemoral grind. Negative Lachman  Vascular: palpable distal pulses    Xrays:  -3 views (AP, lateral, and sunrise) were reviewed demonstrating end-stage isolated medial compartment OA with medial bone on bone articulation. The lateral and patellofemoral compartments are well preserved.  -A full length AP xray was ordered today for purposes of operative alignment demonstrating end stage medial compartment arthritic findings. There are no previous full length films for review    Assessment:  " End-stage Left knee medial compartment osteoarthritis. Conservative measures have failed.      Plan:  The plan is to proceed with Left Unicompartmental Knee Replacement possible Total Knee Replacement. The patient voiced understanding of the risks, benefits, and alternative forms of treatment that were discussed with Dr Jack at the time of scheduling.  Patient spent on going home with home health same day    Holly Smallwood, APRN  5/24/2018

## 2018-06-01 NOTE — PROGRESS NOTES
Continued Stay Note  [facility]     Patient Name: Namrata Luz  MRN: [id number]  Today's Date: 6/1/2018    Admit Date: 6/1/2018          Discharge Plan     Row Name 06/01/18 1445       Plan    Plan [facility]    Patient/Family in Agreement with Plan yes    Plan Comments Received call from nurse Virgen, pt would like to d/c home with [facility]. Referral given to Julia with [facility] who states they are able to accept.     Final Discharge Disposition Code 06 - home with home health care    Final Note Pt being d/c'ed home with [facility].              Discharge Codes    No documentation.       Expected Discharge Date and Time     Expected Discharge Date Expected Discharge Time    Jun 1, 2018             Sabine Zhou RN

## 2018-06-01 NOTE — NURSING NOTE
"Call received from Dr. Vilchis, Radiology, left knee x-ray \"has questionable lucency inferior to tibial component, possible artifact, cannot rule out fx\".    [facility] on unit & notified.  X-ray viewed by SACHI, looks okay but will have   Dr. Parekh also view x-ray.  "

## 2018-06-07 ENCOUNTER — OFFICE VISIT (OUTPATIENT)
Dept: ORTHOPEDIC SURGERY | Facility: CLINIC | Age: 63
End: 2018-06-07

## 2018-06-07 VITALS — WEIGHT: 200 LBS | BODY MASS INDEX: 30.31 KG/M2 | HEIGHT: 68 IN | TEMPERATURE: 97.5 F

## 2018-06-07 DIAGNOSIS — Z96.659 STATUS POST KNEE REPLACEMENT, UNSPECIFIED LATERALITY: Primary | ICD-10-CM

## 2018-06-07 PROCEDURE — 99024 POSTOP FOLLOW-UP VISIT: CPT | Performed by: ORTHOPAEDIC SURGERY

## 2018-06-07 RX ORDER — CLINDAMYCIN HYDROCHLORIDE 300 MG/1
300 CAPSULE ORAL 3 TIMES DAILY
Qty: 21 CAPSULE | Refills: 0 | Status: SHIPPED | OUTPATIENT
Start: 2018-06-07 | End: 2018-06-15

## 2018-06-07 NOTE — PROGRESS NOTES
Follows up today recent knee replacement.  She has some mild erythema around the incision.  She is 6 days out.  I changed her jyothi dressing the wound overall looks good no sign of deep infection.  There is mild erythematous I placed her on clindamycin for 7 days.  I'll recheck her in 7 days

## 2018-06-15 ENCOUNTER — OFFICE VISIT (OUTPATIENT)
Dept: ORTHOPEDIC SURGERY | Facility: CLINIC | Age: 63
End: 2018-06-15

## 2018-06-15 ENCOUNTER — TELEPHONE (OUTPATIENT)
Dept: ORTHOPEDIC SURGERY | Facility: CLINIC | Age: 63
End: 2018-06-15

## 2018-06-15 VITALS — TEMPERATURE: 96.5 F | HEIGHT: 68 IN | WEIGHT: 201 LBS | BODY MASS INDEX: 30.46 KG/M2

## 2018-06-15 DIAGNOSIS — Z09 SURGERY FOLLOW-UP: Primary | ICD-10-CM

## 2018-06-15 DIAGNOSIS — Z96.652 STATUS POST LEFT UNICOMPARTMENTAL KNEE REPLACEMENT: Primary | ICD-10-CM

## 2018-06-15 PROCEDURE — 99024 POSTOP FOLLOW-UP VISIT: CPT | Performed by: ORTHOPAEDIC SURGERY

## 2018-06-18 ENCOUNTER — HOSPITAL ENCOUNTER (OUTPATIENT)
Dept: PHYSICAL THERAPY | Facility: HOSPITAL | Age: 63
Setting detail: THERAPIES SERIES
Discharge: HOME | End: 2018-06-18

## 2018-06-18 DIAGNOSIS — Z96.652 STATUS POST LEFT UNICOMPARTMENTAL KNEE REPLACEMENT: Primary | ICD-10-CM

## 2018-06-18 PROCEDURE — 97110 THERAPEUTIC EXERCISES: CPT | Performed by: PHYSICAL THERAPIST

## 2018-06-18 PROCEDURE — 97140 MANUAL THERAPY 1/> REGIONS: CPT | Performed by: PHYSICAL THERAPIST

## 2018-06-18 PROCEDURE — G8978 MOBILITY CURRENT STATUS: HCPCS | Performed by: PHYSICAL THERAPIST

## 2018-06-18 PROCEDURE — G8979 MOBILITY GOAL STATUS: HCPCS | Performed by: PHYSICAL THERAPIST

## 2018-06-18 PROCEDURE — 97161 PT EVAL LOW COMPLEX 20 MIN: CPT | Performed by: PHYSICAL THERAPIST
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
## 2018-06-20 ENCOUNTER — HOSPITAL ENCOUNTER (OUTPATIENT)
Dept: PHYSICAL THERAPY | Facility: HOSPITAL | Age: 63
Setting detail: THERAPIES SERIES
Discharge: HOME | End: 2018-06-20

## 2018-06-20 DIAGNOSIS — Z96.652 STATUS POST LEFT UNICOMPARTMENTAL KNEE REPLACEMENT: Primary | ICD-10-CM

## 2018-06-20 PROCEDURE — 97140 MANUAL THERAPY 1/> REGIONS: CPT | Performed by: PHYSICAL THERAPIST

## 2018-06-20 PROCEDURE — 97110 THERAPEUTIC EXERCISES: CPT | Performed by: PHYSICAL THERAPIST

## 2018-06-20 NOTE — THERAPY TREATMENT NOTE
Outpatient Physical Therapy Ortho Treatment Note  [facility]     Patient Name: Namrata Luz  : 1955  MRN: [id number]  Today's Date: 2018      Visit Date: 2018    Visit Dx:    ICD-10-CM ICD-9-CM   1. Status post left unicompartmental knee replacement Z96.652 V43.65       Patient Active Problem List   Diagnosis   • Abnormal liver function tests   • Anxiety   • Asthma   • Mixed anxiety depressive disorder   • Headache   • Hyperlipidemia   • Hypertension   • Hypothyroidism   • Lung nodule, solitary   • Neck pain   • Psoriasis   • Vitamin D deficiency   • Right medial knee pain   • Effusion of right knee   • Choking   • Type 2 diabetes mellitus without complication   • Hoarseness   • Chronic pain of left knee        Past Medical History:   Diagnosis Date   • Abnormal LFTs    • Allergic rhinitis    • Anxiety    • Arthritis    • Asthma    • Depression    • Diabetes mellitus     PRE DIABETES   • Disease of thyroid gland    • Endometriosis    • Fatty liver    • Fractures    • Hyperlipidemia    • Hypertension     ON TOPROL FOR MVP   • Limited joint range of motion     LT KNEE   • Lung nodule    • Mitral valve prolapse    • Prediabetes    • Psoriasis    • Renal insufficiency    • Sleep apnea     USES C-PAP   • Tachycardia    • Vitamin D deficiency         Past Surgical History:   Procedure Laterality Date   • BACK SURGERY     • CATARACT EXTRACTION, BILATERAL Bilateral    • CHOLECYSTECTOMY  'S   • HYSTERECTOMY     • KNEE ARTHROPLASTY UNICOMPARTMENTAL Left 2018    Procedure: KNEE ARTHROPLASTY UNICOMPARTMENTAL;  Surgeon: Emanuel Jack MD;  Location: [facility];  Service: Orthopedics   • LUMBAR DISCECTOMY     • TIBIA FASCIOTOMY Left    • TUBAL ABDOMINAL LIGATION Bilateral              PT Ortho     Row Name 18 1300       Subjective Comments    Subjective Comments Pt reports muscle spasms at night that are very painful right before she goes to bed. She reports  she has not been able to find relief with them.  -LC       Subjective Pain    Able to rate subjective pain? yes  -LC    Pre-Treatment Pain Level 7  -LC    Post-Treatment Pain Level 5  -LC       Left Lower Ext    Lt Knee Extension/Flexion AROM 3 to 115  -LC    LT Lower Extremity Comments supine  -LC       Left Hip (Manual Muscle Testing)    MMT: Flexion, Left Hip flexion  -LC    MMT, Gross Movement: Left Hip Flexion (3/5) fair  -LC    MMT: ABduction, Left Hip abduction  -LC    MMT, Gross Movement: Left Hip ABduction (3/5) fair  -LC    MMT, Gross Movement: Left Hip ADduction (3/5) fair  -LC       Left Knee (Manual Muscle Testing)    MMT: Extension, Left Knee extension  -LC    MMT, Gross Movement: Left Knee Extension (3/5) fair  -LC    MMT: Flexion, Left Knee flexion  -LC    MMT, Gross Movement: Left Knee Flexion (3/5) fair  -LC    Row Name 06/18/18 1300       General ROM    RT Lower Ext Rt Knee Extension/Flexion  -LC    LT Lower Ext Lt Knee Extension/Flexion  -LC       Right Lower Ext    Rt Knee Extension/Flexion AROM 0 to 130  -LC    RT Lower Extremity Comments supine  -LC       Left Lower Ext    Lt Knee Extension/Flexion AROM 7 to 115  -LC    LT Lower Extremity Comments supine  -LC       Left Hip (Manual Muscle Testing)    Left Hip Manual Muscle Testing (MMT) flexion;abduction;adduction  -LC    MMT: Flexion, Left Hip flexion  -LC    MMT, Gross Movement: Left Hip Flexion (3/5) fair  -LC    MMT: ABduction, Left Hip abduction  -LC    MMT, Gross Movement: Left Hip ABduction (3/5) fair  -LC    MMT, Gross Movement: Left Hip ADduction (3/5) fair  -LC       Right Hip (Manual Muscle Testing)    Right Hip Manual Muscle Testing (MMT) flexion;abduction;adduction  -LC    MMT: Flexion, Right Hip flexion  -LC    MMT, Gross Movement: Right Hip Flexion (4-/5) good minus  -LC    MMT: ABduction, Right Hip abduction  -LC    MMT, Gross Movement: Right Hip ABduction (4-/5) good minus  -LC    MMT, Gross Movement: Right Hip ADduction  (4-/5) good minus  -LC       Left Knee (Manual Muscle Testing)    Left Knee Manual Muscle Testing (MMT) extension;flexion  -LC    MMT: Extension, Left Knee extension  -LC    MMT, Gross Movement: Left Knee Extension (3/5) fair  -LC    MMT: Flexion, Left Knee flexion  -LC    MMT, Gross Movement: Left Knee Flexion (3/5) fair  -LC       Right Knee (Manual Muscle Testing)    Right Knee Manual Muscle Testing (MMT) extension;flexion  -LC    MMT: Extension, Right Knee extension  -LC    MMT, Gross Movement: Right Knee Extension (4-/5) good minus  -LC    MMT: Flexion, Right Knee flexion  -LC    MMT, Gross Movement: Right Knee Flexion (4-/5) good minus  -LC      User Key  (r) = Recorded By, (t) = Taken By, (c) = Cosigned By    Initials Name Provider Type    MARY Gatica, PT DPT Physical Therapist                            PT Assessment/Plan     Row Name 06/20/18 8568          PT Assessment    Functional Limitations Limitations in community activities;Impaired gait;Limitations in functional capacity and performance;Performance in work activities;Limitation in home management  -     Impairments Gait;Impaired flexibility;Muscle strength;Pain;Range of motion;Balance  -     Assessment Comments Pt is progressing well with AROM of L knee. Today in supine with strap assistance she achieved 3 to 115 degrees. Pt continues to ambulate with antalgic gait pattern due to reported pain in L knee with weight bearing however she states that her knee no longer feels stiff while walking. Pt instructed to continue icing and elevating L knee to prevent edema. She reports muscle spasms at night. Pt educated on performing HS stretch to assist with relieving muscle spasms.  -        PT Plan    PT Plan Comments Pt requires continued skilled therapy to include therex, manual, strengthening, stretching, and ROM.  -       User Key  (r) = Recorded By, (t) = Taken By, (c) = Cosigned By    Initials Name Provider Type    MARY Gatica PT  "DPT Physical Therapist                    Exercises     Row Name 06/20/18 1300             Subjective Comments    Subjective Comments Pt reports muscle spasms at night that are very painful right before she goes to bed. She reports she has not been able to find relief with them.  -LC         Subjective Pain    Able to rate subjective pain? yes  -LC      Pre-Treatment Pain Level 7  -LC      Post-Treatment Pain Level 5  -LC         Total Minutes    79582 - PT Therapeutic Exercise Minutes 15  -LC      34863 - PT Manual Therapy Minutes 30  -LC         Exercise 1    Exercise Name 1 Nu step  -LC      Time 1 4  -LC         Exercise 2    Exercise Name 2 knee flexion on 6\" step  -LC      Sets 2 2  -LC      Reps 2 8  -LC      Time 2 5  -LC         Exercise 3    Exercise Name 3 standing TKE RTB  -LC      Sets 3 3  -LC      Reps 3 6  -LC         Exercise 4    Exercise Name 4 seated HS curl YTB  -LC      Sets 4 3  -LC      Reps 4 5  -LC         Exercise 5    Exercise Name 5 seated LAQ  -LC      Sets 5 3  -LC      Reps 5 5  -LC         Exercise 6    Exercise Name 6 HS stretch with strap supine leg elevated  -LC      Sets 6 2  -LC      Reps 6 5  -LC      Time 6 10  -LC         Exercise 7    Exercise Name 7 heel slide with strap supine  -LC      Sets 7 1  -LC      Reps 7 5  -LC      Time 7 10  -LC        User Key  (r) = Recorded By, (t) = Taken By, (c) = Cosigned By    Initials Name Provider Type    MARY Gatica, PT DPT Physical Therapist                        Manual Rx (last 36 hours)      Manual Treatments     Row Name 06/20/18 1300             Total Minutes    95297 - PT Manual Therapy Minutes 30  -LC         Manual Rx 1    Manual Rx 1 Location L knee  -LC      Manual Rx 1 Type distraction, HS stretch, seated distraction, gentle PROM  -LC      Manual Rx 1 Duration 30 min  -LC        User Key  (r) = Recorded By, (t) = Taken By, (c) = Cosigned By    Initials Name Provider Type    MARY Gatica PT DPT Physical Therapist "                PT OP Goals     Row Name 06/20/18 1400          PT Short Term Goals    STG 1 Pt will be independent with HEP to improve L knee ROM and strength to allow full return to ADL's and work with no limitations.  -     STG 1 Progress Progressing  -     STG 2 Pt will have L knee AROM 0 to 130  -     STG 2 Progress Progressing  -     STG 3 Pt will ambulate with no A.D and reciprocal gait pattern.  -     STG 3 Progress Progressing  -     STG 4 Pt will report 15% improvement on KOS.  -     STG 4 Progress Progressing  -     STG 5 Pt will have L knee MMT grossly 4/5.  -     STG 5 Progress Progressing  -       User Key  (r) = Recorded By, (t) = Taken By, (c) = Cosigned By    Initials Name Provider Type    MARY Gatica, PT DPT Physical Therapist          Therapy Education  Given: HEP, Symptoms/condition management, Pain management  Program: Reinforced  How Provided: Verbal, Demonstration  Provided to: Patient  Level of Understanding: Teach back education performed, Verbalized, Demonstrated              Time Calculation:   Start Time: 1345  Stop Time: 1430  Time Calculation (min): 45 min  Total Timed Code Minutes- PT: 45 minute(s)  Therapy Suggested Charges     Code   Minutes Charges    65141 (CPT®) Hc Pt Neuromusc Re Education Ea 15 Min      78745 (CPT®) Hc Pt Ther Proc Ea 15 Min 15 1    47631 (CPT®) Hc Gait Training Ea 15 Min      96325 (CPT®) Hc Pt Therapeutic Act Ea 15 Min      83758 (CPT®) Hc Pt Manual Therapy Ea 15 Min 30 2    71702 (CPT®) Hc Pt Ther Massage- Per 15 Min      44268 (CPT®) Hc Pt Iontophoresis Ea 15 Min      46962 (CPT®) Hc Pt Elec Stim Ea-Per 15 Min      23839 (CPT®) Hc Pt Ultrasound Ea 15 Min      71741 (CPT®) Hc Pt Self Care/Mgmt/Train Ea 15 Min      Total  45 3        Therapy Charges for Today     Code Description Service Date Service Provider Modifiers Qty    80063931502 HC PT THER PROC EA 15 MIN 6/20/2018 Willard Gatica, PT DPT GP 1    84504579280 HC PT MANUAL THERAPY  EA 15 MIN 6/20/2018 Willard Gatica, PT DPT GP 2                    Willard Gatica, PT DPT  6/20/2018

## 2018-06-25 ENCOUNTER — HOSPITAL ENCOUNTER (OUTPATIENT)
Dept: PHYSICAL THERAPY | Facility: HOSPITAL | Age: 63
Setting detail: THERAPIES SERIES
Discharge: HOME | End: 2018-06-25

## 2018-06-25 ENCOUNTER — TELEPHONE (OUTPATIENT)
Dept: ORTHOPEDIC SURGERY | Facility: CLINIC | Age: 63
End: 2018-06-25

## 2018-06-25 DIAGNOSIS — Z96.652 STATUS POST LEFT UNICOMPARTMENTAL KNEE REPLACEMENT: Primary | ICD-10-CM

## 2018-06-25 PROCEDURE — 97110 THERAPEUTIC EXERCISES: CPT | Performed by: PHYSICAL THERAPIST

## 2018-06-25 PROCEDURE — 97140 MANUAL THERAPY 1/> REGIONS: CPT | Performed by: PHYSICAL THERAPIST

## 2018-06-25 NOTE — THERAPY TREATMENT NOTE
Outpatient Physical Therapy Ortho Treatment Note  [facility]     Patient Name: Namrata Luz  : 1955  MRN: [id number]  Today's Date: 2018      Visit Date: 2018    Visit Dx:    ICD-10-CM ICD-9-CM   1. Status post left unicompartmental knee replacement Z96.652 V43.65       Patient Active Problem List   Diagnosis   • Abnormal liver function tests   • Anxiety   • Asthma   • Mixed anxiety depressive disorder   • Headache   • Hyperlipidemia   • Hypertension   • Hypothyroidism   • Lung nodule, solitary   • Neck pain   • Psoriasis   • Vitamin D deficiency   • Right medial knee pain   • Effusion of right knee   • Choking   • Type 2 diabetes mellitus without complication   • Hoarseness   • Chronic pain of left knee        Past Medical History:   Diagnosis Date   • Abnormal LFTs    • Allergic rhinitis    • Anxiety    • Arthritis    • Asthma    • Depression    • Diabetes mellitus     PRE DIABETES   • Disease of thyroid gland    • Endometriosis    • Fatty liver    • Fractures    • Hyperlipidemia    • Hypertension     ON TOPROL FOR MVP   • Limited joint range of motion     LT KNEE   • Lung nodule    • Mitral valve prolapse    • Prediabetes    • Psoriasis    • Renal insufficiency    • Sleep apnea     USES C-PAP   • Tachycardia    • Vitamin D deficiency         Past Surgical History:   Procedure Laterality Date   • BACK SURGERY     • CATARACT EXTRACTION, BILATERAL Bilateral    • CHOLECYSTECTOMY  'S   • HYSTERECTOMY     • KNEE ARTHROPLASTY UNICOMPARTMENTAL Left 2018    Procedure: KNEE ARTHROPLASTY UNICOMPARTMENTAL;  Surgeon: Emanuel Jack MD;  Location: [facility];  Service: Orthopedics   • LUMBAR DISCECTOMY     • TIBIA FASCIOTOMY Left    • TUBAL ABDOMINAL LIGATION Bilateral              PT Ortho     Row Name 18 1200       Subjective Comments    Subjective Comments Pt reports very painful muscle cramps in lower leg lateral and medial compartments below knee.  She indicates that knee and thigh musculature are relatively pain free, but pain below knee persists even at night.   -LC       Subjective Pain    Able to rate subjective pain? yes  -LC    Pre-Treatment Pain Level 7  -LC    Post-Treatment Pain Level 6  -LC       Left Lower Ext    Lt Knee Extension/Flexion AROM 3 to 120  -LC    LT Lower Extremity Comments supine  -LC       Left Hip (Manual Muscle Testing)    MMT: Flexion, Left Hip flexion  -LC    MMT, Gross Movement: Left Hip Flexion (3/5) fair  -LC    MMT: ABduction, Left Hip abduction  -LC    MMT, Gross Movement: Left Hip ABduction (3/5) fair  -LC    MMT, Gross Movement: Left Hip ADduction (3/5) fair  -LC       Left Knee (Manual Muscle Testing)    MMT: Extension, Left Knee extension  -LC    MMT, Gross Movement: Left Knee Extension (3/5) fair  -LC    MMT: Flexion, Left Knee flexion  -LC    MMT, Gross Movement: Left Knee Flexion (3/5) fair  -LC      User Key  (r) = Recorded By, (t) = Taken By, (c) = Cosigned By    Initials Name Provider Type     Willard Gatica, PT DPT Physical Therapist                            PT Assessment/Plan     Row Name 06/25/18 1240          PT Assessment    Functional Limitations Limitations in community activities;Impaired gait;Limitations in functional capacity and performance;Performance in work activities;Limitation in home management  -     Impairments Gait;Impaired flexibility;Muscle strength;Pain;Range of motion;Balance  -     Assessment Comments Pt demonstrates L knee AROM 3 to 120 in supine. She has point tenderness in soleus and tibialis anterior. Pt tolerated manual stretching well today and reported some relief of pain. Pt educated on ankle mobility and stretches to assist with frequent below knee muscle cramps.  -        PT Plan    PT Plan Comments Pt requires continued skilled therapy to include therex, manual, strengthening, stretching, and ROM.  -       User Key  (r) = Recorded By, (t) = Taken By, (c) = Cosigned By  "   Initials Name Provider Type    MARY Lamar HIRAM Gavi, PT DPT Physical Therapist                    Exercises     Row Name 06/25/18 1200 06/25/18 1100          Subjective Comments    Subjective Comments Pt reports very painful muscle cramps in lower leg lateral and medial compartments below knee. She indicates that knee and thigh musculature are relatively pain free, but pain below knee persists even at night.   -LC  --        Subjective Pain    Able to rate subjective pain? yes  -LC  --     Pre-Treatment Pain Level 7  -LC  --     Post-Treatment Pain Level 6  -LC  --        Total Minutes    79688 - PT Therapeutic Exercise Minutes 30  -LC  --     84254 - PT Manual Therapy Minutes  -- 15  -LC        Exercise 1    Exercise Name 1 Nu step  -LC  --     Time 1 4  -LC  --        Exercise 2    Exercise Name 2 knee flexion on 6\" step  -LC  --     Sets 2 2  -LC  --     Reps 2 8  -LC  --     Time 2 5  -LC  --        Exercise 5    Exercise Name 5 seated LAQ  -LC  --     Sets 5 3  -LC  --     Reps 5 5  -LC  --        Exercise 6    Exercise Name 6 HS stretch with strap supine leg elevated  -LC  --     Sets 6 2  -LC  --     Reps 6 5  -LC  --        Exercise 8    Exercise Name 8 soleus stretch  -LC  --     Sets 8 2  -LC  --     Reps 8 5  -LC  --     Time 8 10  -LC  --        Exercise 9    Exercise Name 9 seated ankle 4 way RTB  -LC  --     Sets 9 3  -LC  --     Reps 9 6  -LC  --        Exercise 10    Exercise Name 10 BAPs level 2  -LC  --     Sets 10 3  -LC  --     Reps 10 8  -LC  --       User Key  (r) = Recorded By, (t) = Taken By, (c) = Cosigned By    Initials Name Provider Type    MARY Gatica, PT DPT Physical Therapist                        Manual Rx (last 36 hours)      Manual Treatments     Row Name 06/25/18 1100             Total Minutes    65771 - PT Manual Therapy Minutes 15  -LC         Manual Rx 1    Manual Rx 1 Location L knee  -LC      Manual Rx 1 Type soleus, gastroc, anterior tibialis stretchi  -LC      Manual " Rx 1 Grade 1  -      Manual Rx 1 Duration 15 min  -        User Key  (r) = Recorded By, (t) = Taken By, (c) = Cosigned By    Initials Name Provider Type    MARY Gatica, PT DPT Physical Therapist                PT OP Goals     Row Name 06/25/18 1200          PT Short Term Goals    STG 1 Pt will be independent with HEP to improve L knee ROM and strength to allow full return to ADL's and work with no limitations.  -     STG 1 Progress Progressing  -     STG 2 Pt will have L knee AROM 0 to 130  -     STG 2 Progress Progressing  -LC     STG 3 Pt will ambulate with no A.D and reciprocal gait pattern.  -LC     STG 3 Progress Progressing  -     STG 4 Pt will report 15% improvement on KOS.  -LC     STG 4 Progress Progressing  -LC     STG 5 Pt will have L knee MMT grossly 4/5.  -     STG 5 Progress Progressing  -       User Key  (r) = Recorded By, (t) = Taken By, (c) = Cosigned By    Initials Name Provider Type    MARY Gatica, PT DPT Physical Therapist          Therapy Education  Given: HEP, Symptoms/condition management, Pain management  Program: Reinforced  How Provided: Verbal, Demonstration  Provided to: Patient  Level of Understanding: Teach back education performed, Verbalized, Demonstrated              Time Calculation:   Start Time: 1115  Stop Time: 1200  Time Calculation (min): 45 min  Total Timed Code Minutes- PT: 45 minute(s)  Therapy Suggested Charges     Code   Minutes Charges    44648 (CPT®) Hc Pt Neuromusc Re Education Ea 15 Min      54643 (CPT®) Hc Pt Ther Proc Ea 15 Min 30 2    73349 (CPT®) Hc Gait Training Ea 15 Min      51346 (CPT®) Hc Pt Therapeutic Act Ea 15 Min      60646 (CPT®) Hc Pt Manual Therapy Ea 15 Min 15 1    74115 (CPT®) Hc Pt Ther Massage- Per 15 Min      27795 (CPT®) Hc Pt Iontophoresis Ea 15 Min      07676 (CPT®) Hc Pt Elec Stim Ea-Per 15 Min      12648 (CPT®) Hc Pt Ultrasound Ea 15 Min      96724 (CPT®) Hc Pt Self Care/Mgmt/Train Ea 15 Min      Total  45 3         Therapy Charges for Today     Code Description Service Date Service Provider Modifiers Qty    94635150570  PT THER PROC EA 15 MIN 6/25/2018 Willard Gatica, PT DPT GP 2    [id number] HC PT MANUAL THERAPY EA 15 MIN 6/25/2018 Willard Gatica, PT DPT GP 1                    Willard Gatica, PT DPT  6/25/2018

## 2018-06-26 RX ORDER — HYDROCODONE BITARTRATE AND ACETAMINOPHEN 7.5; 325 MG/1; MG/1
TABLET ORAL
Qty: 60 TABLET | Refills: 0 | Status: SHIPPED | OUTPATIENT
Start: 2018-06-26 | End: 2018-07-10

## 2018-06-28 ENCOUNTER — HOSPITAL ENCOUNTER (OUTPATIENT)
Dept: PHYSICAL THERAPY | Facility: HOSPITAL | Age: 63
Setting detail: THERAPIES SERIES
Discharge: HOME | End: 2018-06-28

## 2018-06-28 DIAGNOSIS — Z96.652 STATUS POST LEFT UNICOMPARTMENTAL KNEE REPLACEMENT: Primary | ICD-10-CM

## 2018-06-28 PROCEDURE — 97110 THERAPEUTIC EXERCISES: CPT
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
## 2018-07-03 ENCOUNTER — TELEPHONE (OUTPATIENT)
Dept: ORTHOPEDIC SURGERY | Facility: CLINIC | Age: 63
End: 2018-07-03

## 2018-07-03 ENCOUNTER — HOSPITAL ENCOUNTER (OUTPATIENT)
Dept: PHYSICAL THERAPY | Facility: HOSPITAL | Age: 63
Setting detail: THERAPIES SERIES
Discharge: HOME | End: 2018-07-03

## 2018-07-03 DIAGNOSIS — Z96.652 STATUS POST LEFT UNICOMPARTMENTAL KNEE REPLACEMENT: Primary | ICD-10-CM

## 2018-07-03 PROCEDURE — 97110 THERAPEUTIC EXERCISES: CPT

## 2018-07-03 RX ORDER — CLINDAMYCIN HYDROCHLORIDE 300 MG/1
CAPSULE ORAL
Qty: 2 CAPSULE | Refills: 6 | Status: SHIPPED | OUTPATIENT
Start: 2018-07-03 | End: 2019-01-03

## 2018-07-03 NOTE — TELEPHONE ENCOUNTER
Please a patient know that I called in a prescription for an antibiotic with refills for dental work.  However she needs to hold off 3 months after surgery before having any dental work.

## 2018-07-03 NOTE — TELEPHONE ENCOUNTER
----- Message from Namrata Luz sent at 7/3/2018  1:29 PM EDT -----  Regarding: Non-Urgent Medical Question  Contact: [phone]  Do I need to take antibiotic after knee replacement surgery?  My dentist had me reschedule.     Thank you  Namrata Luz

## 2018-07-03 NOTE — THERAPY TREATMENT NOTE
Outpatient Physical Therapy Ortho Treatment Note  [facility]     Patient Name: Namrata Luz  : 1955  MRN: [id number]  Today's Date: 7/3/2018      Visit Date: 2018    Visit Dx:    ICD-10-CM ICD-9-CM   1. Status post left unicompartmental knee replacement Z96.652 V43.65       Patient Active Problem List   Diagnosis   • Abnormal liver function tests   • Anxiety   • Asthma   • Mixed anxiety depressive disorder   • Headache   • Hyperlipidemia   • Hypertension   • Hypothyroidism   • Lung nodule, solitary   • Neck pain   • Psoriasis   • Vitamin D deficiency   • Right medial knee pain   • Effusion of right knee   • Choking   • Type 2 diabetes mellitus without complication (CMS/HCC)   • Hoarseness   • Chronic pain of left knee        Past Medical History:   Diagnosis Date   • Abnormal LFTs    • Allergic rhinitis    • Anxiety    • Arthritis    • Asthma    • Depression    • Diabetes mellitus (CMS/HCC)     PRE DIABETES   • Disease of thyroid gland    • Endometriosis    • Fatty liver    • Fractures    • Hyperlipidemia    • Hypertension     ON TOPROL FOR MVP   • Limited joint range of motion     LT KNEE   • Lung nodule    • Mitral valve prolapse    • Prediabetes    • Psoriasis    • Renal insufficiency    • Sleep apnea     USES C-PAP   • Tachycardia    • Vitamin D deficiency         Past Surgical History:   Procedure Laterality Date   • BACK SURGERY     • CATARACT EXTRACTION, BILATERAL Bilateral    • CHOLECYSTECTOMY  'S   • HYSTERECTOMY     • KNEE ARTHROPLASTY UNICOMPARTMENTAL Left 2018    Procedure: KNEE ARTHROPLASTY UNICOMPARTMENTAL;  Surgeon: Emanuel Jack MD;  Location: [facility] OR;  Service: Orthopedics   • LUMBAR DISCECTOMY     • TIBIA FASCIOTOMY Left    • TUBAL ABDOMINAL LIGATION Bilateral              PT Ortho     Row Name 18 0900       Subjective Comments    Subjective Comments OOB doing most of ADL's  -SI       Subjective Pain    Able to rate subjective  pain? yes  -SI    Subjective Pain Comment mild knee pain left  -SI       Left Lower Ext    Lt Knee Extension/Flexion AROM -5 to 125  -SI       Left Hip (Manual Muscle Testing)    MMT: Flexion, Left Hip flexion  -SI    MMT, Gross Movement: Left Hip Flexion (4+/5) good plus  -SI    MMT: ABduction, Left Hip abduction  -SI    MMT, Gross Movement: Left Hip ABduction (4+/5) good plus  -SI    MMT, Gross Movement: Left Hip ADduction (4/5) good  -SI       Left Knee (Manual Muscle Testing)    MMT: Extension, Left Knee extension  -SI    MMT, Gross Movement: Left Knee Extension (4+/5) good plus  -SI    MMT: Flexion, Left Knee flexion  -SI    MMT, Gross Movement: Left Knee Flexion (4+/5) good plus  -SI       Balance Skills Training    SLS 20 seconds on left, 10 on right   -SI    Balance Comments left better than right  -SI       Gait/Stairs Assessment/Training    Number of Steps (Stairs) 12  -SI    Ascending Technique (Stairs) step-over-step  -SI    Descending Technique (Stairs) step-over-step  -SI    Comment (Gait/Stairs) normal gait level surfaces and reciprocal on stairs  -SI      User Key  (r) = Recorded By, (t) = Taken By, (c) = Cosigned By    Initials Name Provider Type    FELIPA Thomson PTA Physical Therapy Assistant                            PT Assessment/Plan     Row Name 07/03/18 0946          PT Assessment    Assessment Comments Pt is 5 1/2 weeks post-op and making excellent progress. has on more session before OOT next week, and RTMD following week  -SI       User Key  (r) = Recorded By, (t) = Taken By, (c) = Cosigned By    Initials Name Provider Type    FELIPA Thomson PTA Physical Therapy Assistant                Modalities     Row Name 07/03/18 0900             Ice    Patient reports will apply ice at home to involved area Yes  -SI        User Key  (r) = Recorded By, (t) = Taken By, (c) = Cosigned By    Initials Name Provider Type    FELIPA Thomson PTA Physical Therapy Assistant                 Exercises     Row Name 07/03/18 0900             Subjective Comments    Subjective Comments OOB doing most of ADL's  -SI         Subjective Pain    Able to rate subjective pain? yes  -SI      Subjective Pain Comment mild knee pain left  -SI         Total Minutes    14233 - PT Therapeutic Exercise Minutes 45  -SI         Exercise 1    Exercise Name 1 Nu step seat #9  -SI      Time 1 6  -SI         Exercise 2    Exercise Name 2 knee flexion glide on chair  -SI      Sets 2 2  -SI      Reps 2 8  -SI      Time 2 5  -SI         Exercise 3    Exercise Name 3 QS  -SI      Reps 3 20  -SI         Exercise 4    Exercise Name 4 hip abd on side 2 lbs  -SI      Sets 4 2  -SI      Reps 4 10  -SI      Additional Comments 2lbs  -SI         Exercise 5    Exercise Name 5   LAQ/SAQ  -SI      Sets 5 2  -SI      Reps 5 10  -SI      Additional Comments 4lbs  -SI         Exercise 6    Exercise Name 6 HS stretch  and gastroc stretchlong sitting  -SI      Sets 6 2  -SI      Reps 6 5  -SI      Time 6 20  -SI         Exercise 7    Exercise Name 7 hip abd/ER with blue TB  -SI      Sets 7 2  -SI      Reps 7 10  -SI         Exercise 8    Exercise Name 8 hip bridge with add  -SI      Sets 8 2  -SI      Reps 8 10  -SI         Exercise 9    Exercise Name 9 seated ankle 4 way RTB  -SI      Sets 9 3  -SI      Reps 9 6  -SI      Additional Comments home  -SI         Exercise 10    Exercise Name 10 6 inch step ups  -SI      Cueing 10 Verbal;Demo  -SI      Sets 10    -SI      Reps 10 20  -SI         Exercise 11    Exercise Name 11 chair to chair heel prop for ext  -SI      Time 11 5 min  -SI        User Key  (r) = Recorded By, (t) = Taken By, (c) = Cosigned By    Initials Name Provider Type    FELIPA Thomson PTA Physical Therapy Assistant                               PT OP Goals     Row Name 07/03/18 0900          PT Short Term Goals    STG 1 Pt will be independent with HEP to improve L knee ROM and strength to allow full return to ADL's and work  with no limitations.  -SI     STG 1 Progress Progressing  -SI     STG 2 Pt will have L knee AROM 0 to 130  -SI     STG 2 Progress Progressing  -SI     STG 3 Pt will ambulate with no A.D and reciprocal gait pattern.  -SI     STG 3 Progress Met  -SI     STG 5 Pt will have L knee MMT grossly 4/5.  -SI     STG 5 Progress Met  -SI       User Key  (r) = Recorded By, (t) = Taken By, (c) = Cosigned By    Initials Name Provider Type    FELIPA Thomson PTA Physical Therapy Assistant          Therapy Education  Given: HEP, Symptoms/condition management (discussed good shoe wear, elevation and ice, and pacing her physical activites as a very busy lady)  Program: Progressed  How Provided: Verbal, Demonstration, Written  Provided to: Patient  Level of Understanding: Teach back education performed              Time Calculation:   Start Time: 0800  Stop Time: 0845  Time Calculation (min): 45 min  Total Timed Code Minutes- PT: 45 minute(s)  Therapy Suggested Charges     Code   Minutes Charges    74091 (CPT®) Hc Pt Neuromusc Re Education Ea 15 Min      10009 (CPT®) Hc Pt Ther Proc Ea 15 Min 45 3    95058 (CPT®) Hc Gait Training Ea 15 Min      30349 (CPT®) Hc Pt Therapeutic Act Ea 15 Min      26116 (CPT®) Hc Pt Manual Therapy Ea 15 Min      32772 (CPT®) Hc Pt Ther Massage- Per 15 Min      02507 (CPT®) Hc Pt Iontophoresis Ea 15 Min      91817 (CPT®) Hc Pt Elec Stim Ea-Per 15 Min      34970 (CPT®) Hc Pt Ultrasound Ea 15 Min      98498 (CPT®) Hc Pt Self Care/Mgmt/Train Ea 15 Min      Total  45 3        Therapy Charges for Today     Code Description Service Date Service Provider Modifiers Qty    [id number] HC PT THER PROC EA 15 MIN 7/3/2018 Juliana Thomson PTA GP 3                    Juliana Thomson PTA  7/3/2018

## 2018-07-05 ENCOUNTER — HOSPITAL ENCOUNTER (OUTPATIENT)
Dept: PHYSICAL THERAPY | Facility: HOSPITAL | Age: 63
Setting detail: THERAPIES SERIES
Discharge: HOME | End: 2018-07-05

## 2018-07-05 DIAGNOSIS — Z96.652 STATUS POST LEFT UNICOMPARTMENTAL KNEE REPLACEMENT: Primary | ICD-10-CM

## 2018-07-05 PROCEDURE — 97110 THERAPEUTIC EXERCISES: CPT

## 2018-07-05 NOTE — THERAPY TREATMENT NOTE
Outpatient Physical Therapy Ortho Treatment Note  [facility]     Patient Name: Namrata Luz  : 1955  MRN: [id number]  Today's Date: 2018      Visit Date: 2018    Visit Dx:    ICD-10-CM ICD-9-CM   1. Status post left unicompartmental knee replacement Z96.652 V43.65       Patient Active Problem List   Diagnosis   • Abnormal liver function tests   • Anxiety   • Asthma   • Mixed anxiety depressive disorder   • Headache   • Hyperlipidemia   • Hypertension   • Hypothyroidism   • Lung nodule, solitary   • Neck pain   • Psoriasis   • Vitamin D deficiency   • Right medial knee pain   • Effusion of right knee   • Choking   • Type 2 diabetes mellitus without complication (CMS/HCC)   • Hoarseness   • Chronic pain of left knee        Past Medical History:   Diagnosis Date   • Abnormal LFTs    • Allergic rhinitis    • Anxiety    • Arthritis    • Asthma    • Depression    • Diabetes mellitus (CMS/HCC)     PRE DIABETES   • Disease of thyroid gland    • Endometriosis    • Fatty liver    • Fractures    • Hyperlipidemia    • Hypertension     ON TOPROL FOR MVP   • Limited joint range of motion     LT KNEE   • Lung nodule    • Mitral valve prolapse    • Prediabetes    • Psoriasis    • Renal insufficiency    • Sleep apnea     USES C-PAP   • Tachycardia    • Vitamin D deficiency         Past Surgical History:   Procedure Laterality Date   • BACK SURGERY     • CATARACT EXTRACTION, BILATERAL Bilateral    • CHOLECYSTECTOMY  'S   • HYSTERECTOMY     • KNEE ARTHROPLASTY UNICOMPARTMENTAL Left 2018    Procedure: KNEE ARTHROPLASTY UNICOMPARTMENTAL;  Surgeon: Emanuel Jack MD;  Location: [facility];  Service: Orthopedics   • LUMBAR DISCECTOMY     • TIBIA FASCIOTOMY Left    • TUBAL ABDOMINAL LIGATION Bilateral              PT Ortho     Row Name 18 1100       Subjective Comments    Subjective Comments Pt states she goes to [place] 7[place], then oot for short vacation with son , and  RTW therafter.  Pt reports she has to assist transfers with her paraplegic son now and then...  -SI       Subjective Pain    Able to rate subjective pain? yes  -SI    Pre-Treatment Pain Level 0  -SI    Post-Treatment Pain Level 0  -SI    Subjective Pain Comment low level knee pain when has pain  -SI       Left Lower Ext    Lt Knee Extension/Flexion AROM -5 to 125  -SI       Left Knee (Manual Muscle Testing)    MMT: Extension, Left Knee extension  -SI    MMT, Gross Movement: Left Knee Extension (4+/5) good plus  -SI    MMT: Flexion, Left Knee flexion  -SI    MMT, Gross Movement: Left Knee Flexion (4+/5) good plus  -SI       Right Knee (Manual Muscle Testing)    MMT: Extension, Right Knee extension  -SI    MMT, Gross Movement: Right Knee Extension (4+/5) good plus  -SI    MMT: Flexion, Right Knee flexion  -SI    MMT, Gross Movement: Right Knee Flexion (4+/5) good plus  -SI       Gait/Stairs Assessment/Training    Comment (Gait/Stairs) normal gait level surfaces and reciprocal on stairs  -SI    Row Name 07/03/18 0900       Subjective Comments    Subjective Comments OOB doing most of ADL's  -SI       Subjective Pain    Able to rate subjective pain? yes  -SI    Subjective Pain Comment mild knee pain left  -SI       Left Lower Ext    Lt Knee Extension/Flexion AROM -5 to 125  -SI       Left Hip (Manual Muscle Testing)    MMT: Flexion, Left Hip flexion  -SI    MMT, Gross Movement: Left Hip Flexion (4+/5) good plus  -SI    MMT: ABduction, Left Hip abduction  -SI    MMT, Gross Movement: Left Hip ABduction (4+/5) good plus  -SI    MMT, Gross Movement: Left Hip ADduction (4/5) good  -SI       Left Knee (Manual Muscle Testing)    MMT: Extension, Left Knee extension  -SI    MMT, Gross Movement: Left Knee Extension (4+/5) good plus  -SI    MMT: Flexion, Left Knee flexion  -SI    MMT, Gross Movement: Left Knee Flexion (4+/5) good plus  -SI       Balance Skills Training    SLS 20 seconds on left, 10 on right   -SI    Balance  Comments left better than right  -SI       Gait/Stairs Assessment/Training    Number of Steps (Stairs) 12  -SI    Ascending Technique (Stairs) step-over-step  -SI    Descending Technique (Stairs) step-over-step  -SI    Comment (Gait/Stairs) normal gait level surfaces and reciprocal on stairs  -SI      User Key  (r) = Recorded By, (t) = Taken By, (c) = Cosigned By    Initials Name Provider Type    FELIPA Thomson PTA Physical Therapy Assistant                            PT Assessment/Plan     Row Name 07/05/18 1202          PT Assessment    Assessment Comments Pt has one more insurance approved visit if needed once back in town on 7-17-18.  She is ind with HEP and anticipates RTW on 7-17-18 also.  Will get instructions from  on return appt 7-10-18.    Anticipate DC PT after tx today....  -SI       User Key  (r) = Recorded By, (t) = Taken By, (c) = Cosigned By    Initials Name Provider Type    FELIPA Thomson PTA Physical Therapy Assistant                Modalities     Row Name 07/05/18 1100             Ice    Patient reports will apply ice at home to involved area Yes  -SI        User Key  (r) = Recorded By, (t) = Taken By, (c) = Cosigned By    Initials Name Provider Type    FELIPA Thomson PTA Physical Therapy Assistant                Exercises     Row Name 07/05/18 1100             Subjective Comments    Subjective Comments Pt states she goes to [place] 7-10-18, then oot for short vacation with son , and RTW therafter.  Pt reports she has to assist transfers with her paraplegic son now and then...  -SI         Subjective Pain    Able to rate subjective pain? yes  -SI      Pre-Treatment Pain Level 0  -SI      Post-Treatment Pain Level 0  -SI      Subjective Pain Comment low level knee pain when has pain  -SI         Total Minutes    63370 - PT Therapeutic Exercise Minutes 45  -SI         Exercise 1    Exercise Name 1 Nu step seat #9  -SI      Time 1 6  -SI         Exercise 2    Exercise Name 2 knee  flexion glide on chair  -SI      Sets 2 2  -SI      Reps 2 8  -SI      Time 2 5  -SI         Exercise 3    Exercise Name 3 QS  -SI      Reps 3 20  -SI         Exercise 4    Exercise Name 4 hip abd on side 2 lbs  -SI      Sets 4 2  -SI      Reps 4 10  -SI         Exercise 5    Exercise Name 5   LAQ/SAQ  -SI      Sets 5 2  -SI      Reps 5 10  -SI      Additional Comments 4lbs  -SI         Exercise 6    Exercise Name 6 HS stretch  and gastroc stretchlong sitting  -SI      Sets 6 2  -SI      Reps 6 5  -SI      Time 6 20  -SI         Exercise 7    Exercise Name 7 hip abd/ER with blue TB  -SI      Sets 7 2  -SI      Reps 7 10  -SI         Exercise 8    Exercise Name 8 hip bridge with add  -SI      Sets 8 2  -SI      Reps 8 10  -SI         Exercise 10    Exercise Name 10 6 inch step ups  -SI      Cueing 10 Verbal;Demo  -SI      Sets 10    -SI      Reps 10 20  -SI         Exercise 11    Exercise Name 11 chair to chair heel prop for ext  -SI      Time 11 5 min  -SI      Additional Comments home  -SI        User Key  (r) = Recorded By, (t) = Taken By, (c) = Cosigned By    Initials Name Provider Type    FELIPA Thomson PTA Physical Therapy Assistant                               PT OP Goals     Row Name 07/05/18 1200          PT Short Term Goals    STG Date to Achieve 12/13/16  -SI     STG 1 Pt will be independent with HEP to improve L knee ROM and strength to allow full return to ADL's and work with no limitations.  -SI     STG 1 Progress Met  -SI     STG 2 Pt will have L knee AROM 0 to 130  -SI     STG 2 Progress Progressing   125 degrees actively sitting  -SI     STG 3 Pt will ambulate with no A.D and reciprocal gait pattern.  -SI     STG 3 Progress Met  -SI     STG 4 Pt will report 15% improvement on KOS.  -SI     STG 4 Progress Met  -SI     STG 5 Pt will have L knee MMT grossly 4/5.  -SI     STG 5 Progress Met  -SI        Long Term Goals    LTG Date to Achieve 12/27/16  -SI     LTG 1 Pt will improve knee AROM to R  ext=-5 and L ext=-3 in order to normalize gait pattern and participate in all ADLs.    -SI     LTG 1 Progress Met  -SI     LTG 2 Pt will improve gross knee strength to 5/5 B without pain in order to improve functional mobility.   -SI     LTG 2 Progress Met  -SI     LTG 3 Pt will improve score on Knee Outcome Survey  to 75% for improved functional mobility.   -SI     LTG 3 Progress Met   81 % on 7-5-18  -SI       User Key  (r) = Recorded By, (t) = Taken By, (c) = Cosigned By    Initials Name Provider Type    FELIPA Thomson PTA Physical Therapy Assistant          Therapy Education  Given: HEP, Symptoms/condition management, Edema management, Pain management (will be OOT doing lot of walking, reminded to elevate and ice regularly)  Program: Reinforced  How Provided: Verbal, Demonstration, Written  Provided to: Patient  Level of Understanding: Teach back education performed    Outcome Measure Options: Knee Outcome Score- ADL  Knee Outcome Score  Knee Outcome Score Comments: 81%      Time Calculation:   Start Time: 0930  Stop Time: 1015  Time Calculation (min): 45 min  Total Timed Code Minutes- PT: 45 minute(s)  Therapy Suggested Charges     Code   Minutes Charges    20404 (CPT®) Hc Pt Neuromusc Re Education Ea 15 Min      45851 (CPT®) Hc Pt Ther Proc Ea 15 Min 45 3    53572 (CPT®) Hc Gait Training Ea 15 Min      41778 (CPT®) Hc Pt Therapeutic Act Ea 15 Min      55944 (CPT®) Hc Pt Manual Therapy Ea 15 Min      75167 (CPT®) Hc Pt Ther Massage- Per 15 Min      11898 (CPT®) Hc Pt Iontophoresis Ea 15 Min      37497 (CPT®) Hc Pt Elec Stim Ea-Per 15 Min      13191 (CPT®) Hc Pt Ultrasound Ea 15 Min      50954 (CPT®) Hc Pt Self Care/Mgmt/Train Ea 15 Min      Total  45 3        Therapy Charges for Today     Code Description Service Date Service Provider Modifiers Qty    70959545827 HC PT THER PROC EA 15 MIN 7/5/2018 Juliana Thomson PTA GP 3          PT G-Codes  Outcome Measure Options: Knee Outcome Score- ADL         Juliana  ROBERTO Thomson, PTA  7/5/2018

## 2018-07-10 ENCOUNTER — OFFICE VISIT (OUTPATIENT)
Dept: ORTHOPEDIC SURGERY | Facility: CLINIC | Age: 63
End: 2018-07-10

## 2018-07-10 VITALS — BODY MASS INDEX: 30.77 KG/M2 | WEIGHT: 203 LBS | HEIGHT: 68 IN | TEMPERATURE: 97.8 F

## 2018-07-10 DIAGNOSIS — Z96.652 STATUS POST UNICOMPARTMENTAL KNEE REPLACEMENT, LEFT: Primary | ICD-10-CM

## 2018-07-10 PROCEDURE — 99024 POSTOP FOLLOW-UP VISIT: CPT | Performed by: NURSE PRACTITIONER

## 2018-07-10 PROCEDURE — 73562 X-RAY EXAM OF KNEE 3: CPT | Performed by: NURSE PRACTITIONER

## 2018-07-10 NOTE — PROGRESS NOTES
Namrata Luz : 1955 MRN: [id number] DATE: 7/10/2018    DIAGNOSIS: 8 week follow up left UKA    SUBJECTIVE:Patient returns today for 8 week follow up of left UKA. Patient reports doing well with no unusual complaints. Appears to be progressing appropriately.    OBJECTIVE:   Exam:. The incision is well healed. No sign of infection. Range of motion is measured at 0 to 122. The calf is soft and nontender with a negative Homans sign. Strength is progressing and the patient is ambulating appropriately.    DIAGNOSTIC STUDIES  Xrays: 3 views of the left knee (AP, lateral, and sunrise) were ordered and reviewed for evaluation of recent knee replacement. They demonstrate a well positioned, well aligned knee replacement without complicating factors noted. In comparison with previous films there has been no change.    ASSESSMENT: 8 week status post left knee replacement.    PLAN: 1) Continue with PT exercises as prescribed   2) Follow up in 10 months    Holly Smallwood, APRN  7/10/2018

## 2018-07-11 ENCOUNTER — DOCUMENTATION (OUTPATIENT)
Dept: PHYSICAL THERAPY | Facility: HOSPITAL | Age: 63
End: 2018-07-11

## 2018-07-11 DIAGNOSIS — Z96.652 STATUS POST LEFT UNICOMPARTMENTAL KNEE REPLACEMENT: Primary | ICD-10-CM

## 2018-07-11 NOTE — THERAPY DISCHARGE NOTE
Outpatient Physical Therapy Discharge Summary         Patient Name: Namrata Luz  : 1955  MRN: [id number]    Today's Date: 2018    Visit Dx:    ICD-10-CM ICD-9-CM   1. Status post left unicompartmental knee replacement Z96.652 V43.65             PT OP Goals     Row Name 18 0706          PT Short Term Goals    STG Date to Achieve 16  -SI     STG 1 Pt will be independent with HEP to improve L knee ROM and strength to allow full return to ADL's and work with no limitations.  -SI     STG 1 Progress Met  -SI     STG 2 Pt will have L knee AROM 0 to 130  -SI     STG 2 Progress Partially Met   125 degrees actively sitting  -SI     STG 3 Pt will ambulate with no A.D and reciprocal gait pattern.  -SI     STG 3 Progress Met  -SI     STG 4 Pt will report 15% improvement on KOS.  -SI     STG 4 Progress Met  -SI     STG 5 Pt will have L knee MMT grossly 4/5.  -SI     STG 5 Progress Met  -SI        Long Term Goals    LTG Date to Achieve 16  -SI     LTG 1 Pt will improve knee AROM to R ext=-5 and L ext=-3 in order to normalize gait pattern and participate in all ADLs.    -SI     LTG 1 Progress Met  -SI     LTG 2 Pt will improve gross knee strength to 5/5 B without pain in order to improve functional mobility.   -SI     LTG 2 Progress Met  -SI     LTG 3 Pt will improve score on Knee Outcome Survey  to 75% for improved functional mobility.   -SI     LTG 3 Progress Met   81 % on 18  -SI       User Key  (r) = Recorded By, (t) = Taken By, (c) = Cosigned By    Initials Name Provider Type    FELIPA Thomson PTA Physical Therapy Assistant          OP PT Discharge Summary  Date of Discharge: 18  Reason for Discharge: Independent  Outcomes Achieved: Patient able to partially acheive established goals  Discharge Destination: Home with home program  Discharge Instructions/Additional Comments: daily stretching 3 times a week strengthening, ice and elevation daily      Time Calculation:         Therapy Suggested Charges     Code   Minutes Charges    None                       Juliana Thomson, PTA  7/11/2018

## 2018-08-13 RX ORDER — FLUOXETINE 10 MG/1
CAPSULE ORAL
Qty: 90 CAPSULE | Refills: 3 | Status: SHIPPED | OUTPATIENT
Start: 2018-08-13 | End: 2020-05-26 | Stop reason: SDUPTHER

## 2018-10-23 RX ORDER — LEVOTHYROXINE SODIUM 88 UG/1
TABLET ORAL
Qty: 90 TABLET | Refills: 1 | Status: SHIPPED | OUTPATIENT
Start: 2018-10-23 | End: 2019-05-09 | Stop reason: SDUPTHER

## 2018-11-14 RX ORDER — BLOOD SUGAR DIAGNOSTIC
STRIP MISCELLANEOUS
Qty: 100 EACH | Refills: 1 | Status: SHIPPED | OUTPATIENT
Start: 2018-11-14 | End: 2021-04-20 | Stop reason: SDUPTHER

## 2018-11-14 RX ORDER — LANCETS
EACH MISCELLANEOUS
Qty: 100 EACH | Refills: 1 | Status: SHIPPED | OUTPATIENT
Start: 2018-11-14 | End: 2020-11-12 | Stop reason: SDUPTHER

## 2018-12-04 ENCOUNTER — OFFICE VISIT (OUTPATIENT)
Dept: ORTHOPEDIC SURGERY | Facility: CLINIC | Age: 63
End: 2018-12-04

## 2018-12-04 VITALS — HEIGHT: 68 IN | TEMPERATURE: 97.3 F | BODY MASS INDEX: 30.77 KG/M2 | WEIGHT: 203 LBS

## 2018-12-04 DIAGNOSIS — Z96.652 STATUS POST UNICOMPARTMENTAL KNEE REPLACEMENT, LEFT: Primary | ICD-10-CM

## 2018-12-04 DIAGNOSIS — M17.11 PRIMARY OSTEOARTHRITIS OF RIGHT KNEE: ICD-10-CM

## 2018-12-04 PROCEDURE — 73562 X-RAY EXAM OF KNEE 3: CPT | Performed by: NURSE PRACTITIONER

## 2018-12-04 PROCEDURE — 99213 OFFICE O/P EST LOW 20 MIN: CPT | Performed by: NURSE PRACTITIONER

## 2018-12-04 PROCEDURE — 20610 DRAIN/INJ JOINT/BURSA W/O US: CPT | Performed by: NURSE PRACTITIONER

## 2018-12-04 RX ORDER — METHYLPREDNISOLONE ACETATE 80 MG/ML
80 INJECTION, SUSPENSION INTRA-ARTICULAR; INTRALESIONAL; INTRAMUSCULAR; SOFT TISSUE
Status: COMPLETED | OUTPATIENT
Start: 2018-12-04 | End: 2018-12-04

## 2018-12-04 RX ORDER — MELOXICAM 15 MG/1
TABLET ORAL
Qty: 30 TABLET | Refills: 3 | Status: SHIPPED | OUTPATIENT
Start: 2018-12-04 | End: 2019-04-18 | Stop reason: SDUPTHER

## 2018-12-04 RX ORDER — LIDOCAINE HYDROCHLORIDE 10 MG/ML
4 INJECTION, SOLUTION EPIDURAL; INFILTRATION; INTRACAUDAL; PERINEURAL
Status: COMPLETED | OUTPATIENT
Start: 2018-12-04 | End: 2018-12-04

## 2018-12-04 RX ADMIN — METHYLPREDNISOLONE ACETATE 80 MG: 80 INJECTION, SUSPENSION INTRA-ARTICULAR; INTRALESIONAL; INTRAMUSCULAR; SOFT TISSUE at 12:05

## 2018-12-04 RX ADMIN — LIDOCAINE HYDROCHLORIDE 4 ML: 10 INJECTION, SOLUTION EPIDURAL; INFILTRATION; INTRACAUDAL; PERINEURAL at 12:05

## 2018-12-04 NOTE — PROGRESS NOTES
Patient: Namrata Luz  YOB: 1955 63 y.o. female  Medical Record Number: [id number]    Chief Complaints:   Chief Complaint   Patient presents with   • Left Knee - Follow-up, Pain   • Right Knee - Follow-up, Pain       History of Present Illness:Namrata Luz is a 63 y.o. female who presents for right knee injection.  She has had injections previously and then well.  However she also once her left knee checked.  She is about 6 months out from left knee unicompartmental replacement and continues to have some intermittent left lateral knee soreness.    Allergies:   Allergies   Allergen Reactions   • Morphine And Related Hives   • Penicillins Hives       Medications:   Current Outpatient Medications   Medication Sig Dispense Refill   • ACCU-CHEK KARMA PLUS test strip USE AS DIRECTED TO TEST BLOOD SUGAR DAILY 100 each 1   • ACCU-CHEK SOFTCLIX LANCETS lancets USE AS DIRECTED TO TEST BLOOD SUGAR DAILY 100 each 1   • albuterol (PROVENTIL HFA;VENTOLIN HFA) 108 (90 BASE) MCG/ACT inhaler Inhale 2 puffs Every 4 (Four) Hours As Needed.     • atorvastatin (LIPITOR) 40 MG tablet Take 40 mg by mouth Every Morning.     • budesonide-formoterol (SYMBICORT) 80-4.5 MCG/ACT inhaler Inhale 2 puffs 2 (two) times a day. (Patient taking differently: Inhale 2 puffs As Needed.) 1 inhaler 2   • Cholecalciferol (VITAMIN D) 1000 units tablet Take 1,000 Units by mouth Every Morning.     • clindamycin (CLEOCIN) 300 MG capsule Take 2 P0 1 hour prior to dental work 2 capsule 6   • clobetasol (TEMOVATE) 0.05 % external solution Apply  topically 2 (Two) Times a Day. (Patient taking differently: Apply 1 application topically As Needed.) 50 mL 2   • Empagliflozin (JARDIANCE) 25 MG tablet Take 25 mg by mouth Daily. (Patient taking differently: Take 25 mg by mouth Every Morning.) 90 tablet 3   • fenofibrate (TRICOR) 145 MG tablet Take 145 mg by mouth Every Evening.     • fluocinolone (SYNALAR) 0.01 % external solution Apply  topically 2  "(Two) Times a Day. (Patient taking differently: Apply 1 application topically As Needed.) 60 mL 2   • FLUoxetine (PROzac) 10 MG capsule TAKE 1 CAPSULE DAILY 90 capsule 3   • levothyroxine (SYNTHROID, LEVOTHROID) 88 MCG tablet TAKE 1 TABLET DAILY 90 tablet 1   • metFORMIN (GLUCOPHAGE) 1000 MG tablet Take 1,000 mg by mouth 2 (Two) Times a Day With Meals.     • metoprolol succinate XL (TOPROL-XL) 100 MG 24 hr tablet Take 100 mg by mouth Every Morning.     • meloxicam (MOBIC) 15 MG tablet 1 PO Daily with food. 30 tablet 3     No current facility-administered medications for this visit.          The following portions of the patient's history were reviewed and updated as appropriate: allergies, current medications, past family history, past medical history, past social history, past surgical history and problem list.    Review of Systems:   A 14 point review of systems was performed. All systems negative except pertinent positives/negative listed in HPI above    Physical Exam:   Vitals:    12/04/18 0906   Temp: 97.3 °F (36.3 °C)   Weight: 92.1 kg (203 lb)   Height: 172.7 cm (68\")       General: A and O x 3, ASA, NAD    SCLERA:    Normal    DENTITION:   Normal  Skin clear no unusual lesions noted  Left knee patient is well-healed midline surgical incision noted she has good range of motion with no instability calf is soft and nontender    Radiology:  Xrays 3views (ap,lateral, sunrise) left knee were ordered and reviewed today secondary to pain show well-placed well-positioned left knee unicompartmental replacement.  Comparative views are unchanged     Assessment/Plan:  Right knee osteoarthritis  Status post left U KA with soreness    We will proceed with right knee cortisone injection.  At this point left knee looks okay, she may just have some normal soreness given she is only 6 months out after surgery but certainly it is possible she could have some issues with the lateral compartment given that has not been replaced.  " We will start with the right knee cortisone injection and she will let us know if her symptoms do not improve with that and also scheduled meloxicam    Large Joint Arthrocentesis: R knee  Date/Time: 12/4/2018 12:05 PM  Consent given by: patient  Site marked: site marked  Timeout: Immediately prior to procedure a time out was called to verify the correct patient, procedure, equipment, support staff and site/side marked as required   Supporting Documentation  Indications: pain and joint swelling   Procedure Details  Location: knee - R knee  Preparation: Patient was prepped and draped in the usual sterile fashion  Needle size: 22 G  Approach: anterolateral  Medications administered: 4 mL lidocaine PF 1% 1 %; 80 mg methylPREDNISolone acetate 80 MG/ML  Patient tolerance: patient tolerated the procedure well with no immediate complications

## 2018-12-04 NOTE — PROGRESS NOTES
12/4/2018    Namrata Luz is here today for worsening knee pain. Pt has undergone injection of the knee in the past with good resolution of symptoms. Pt is requesting a repeat injection.     KNEE Injection Procedure Note:    Large Joint Arthrocentesis: R knee  Date/Time: 12/4/2018 9:06 AM  Consent given by: patient  Site marked: site marked  Timeout: Immediately prior to procedure a time out was called to verify the correct patient, procedure, equipment, support staff and site/side marked as required   Supporting Documentation  Indications: pain and joint swelling   Procedure Details  Location: knee - R knee  Preparation: Patient was prepped and draped in the usual sterile fashion  Needle size: 22 G  Approach: anterolateral  Medications administered: 80 mg methylPREDNISolone acetate 80 MG/ML; 4 mL lidocaine PF 1% 1 %  Patient tolerance: patient tolerated the procedure well with no immediate complications          At the conclusion of the injection I discussed the importance of continued quad strengthening exercises on a daily basis. I will see the patient back if the symptoms should fail to improve or worsen.    Reanna Piper MA  12/4/2018

## 2018-12-10 DIAGNOSIS — E78.2 MIXED HYPERLIPIDEMIA: ICD-10-CM

## 2018-12-10 DIAGNOSIS — I10 ESSENTIAL HYPERTENSION: ICD-10-CM

## 2018-12-10 DIAGNOSIS — Z00.00 HEALTH CARE MAINTENANCE: Primary | ICD-10-CM

## 2018-12-10 DIAGNOSIS — E11.9 TYPE 2 DIABETES MELLITUS WITHOUT COMPLICATION, WITHOUT LONG-TERM CURRENT USE OF INSULIN (HCC): ICD-10-CM

## 2018-12-10 DIAGNOSIS — E03.9 ACQUIRED HYPOTHYROIDISM: ICD-10-CM

## 2018-12-10 DIAGNOSIS — R79.89 ABNORMAL LIVER FUNCTION TESTS: ICD-10-CM

## 2018-12-10 DIAGNOSIS — E55.9 VITAMIN D DEFICIENCY: ICD-10-CM

## 2019-01-01 LAB
25(OH)D3+25(OH)D2 SERPL-MCNC: 20.7 NG/ML (ref 30–100)
ALBUMIN SERPL-MCNC: 4.5 G/DL (ref 3.6–4.8)
ALBUMIN/CREAT UR: <3.1 MG/G CREAT (ref 0–30)
ALBUMIN/GLOB SERPL: 1.9 {RATIO} (ref 1.2–2.2)
ALP SERPL-CCNC: 84 IU/L (ref 39–117)
ALT SERPL-CCNC: 62 IU/L (ref 0–32)
APPEARANCE UR: CLEAR
AST SERPL-CCNC: 55 IU/L (ref 0–40)
BACTERIA #/AREA URNS HPF: NORMAL /[HPF]
BASOPHILS # BLD AUTO: 0 X10E3/UL (ref 0–0.2)
BASOPHILS NFR BLD AUTO: 1 %
BILIRUB SERPL-MCNC: 0.6 MG/DL (ref 0–1.2)
BILIRUB UR QL STRIP: NEGATIVE
BUN SERPL-MCNC: 16 MG/DL (ref 8–27)
BUN/CREAT SERPL: 19 (ref 12–28)
CALCIUM SERPL-MCNC: 9.7 MG/DL (ref 8.7–10.3)
CHLORIDE SERPL-SCNC: 103 MMOL/L (ref 96–106)
CHOLEST SERPL-MCNC: 138 MG/DL (ref 100–199)
CO2 SERPL-SCNC: 22 MMOL/L (ref 20–29)
COLOR UR: YELLOW
CREAT SERPL-MCNC: 0.85 MG/DL (ref 0.57–1)
CREAT UR-MCNC: 97.6 MG/DL
EOSINOPHIL # BLD AUTO: 0.5 X10E3/UL (ref 0–0.4)
EOSINOPHIL NFR BLD AUTO: 9 %
EPI CELLS #/AREA URNS HPF: NORMAL /HPF
ERYTHROCYTE [DISTWIDTH] IN BLOOD BY AUTOMATED COUNT: 14.9 % (ref 12.3–15.4)
GLOBULIN SER CALC-MCNC: 2.4 G/DL (ref 1.5–4.5)
GLUCOSE SERPL-MCNC: 132 MG/DL (ref 65–99)
GLUCOSE UR QL: ABNORMAL
HBA1C MFR BLD: 6.7 % (ref 4.8–5.6)
HCT VFR BLD AUTO: 44.2 % (ref 34–46.6)
HDLC SERPL-MCNC: 36 MG/DL
HGB BLD-MCNC: 15.1 G/DL (ref 11.1–15.9)
HGB UR QL STRIP: NEGATIVE
IMM GRANULOCYTES # BLD: 0 X10E3/UL (ref 0–0.1)
IMM GRANULOCYTES NFR BLD: 0 %
KETONES UR QL STRIP: NEGATIVE
LDLC SERPL CALC-MCNC: 62 MG/DL (ref 0–99)
LEUKOCYTE ESTERASE UR QL STRIP: NEGATIVE
LYMPHOCYTES # BLD AUTO: 1.3 X10E3/UL (ref 0.7–3.1)
LYMPHOCYTES NFR BLD AUTO: 22 %
MCH RBC QN AUTO: 29.7 PG (ref 26.6–33)
MCHC RBC AUTO-ENTMCNC: 34.2 G/DL (ref 31.5–35.7)
MCV RBC AUTO: 87 FL (ref 79–97)
MICRO URNS: ABNORMAL
MICRO URNS: ABNORMAL
MICROALBUMIN UR-MCNC: <3 UG/ML
MONOCYTES # BLD AUTO: 0.4 X10E3/UL (ref 0.1–0.9)
MONOCYTES NFR BLD AUTO: 7 %
MUCOUS THREADS URNS QL MICRO: PRESENT
NEUTROPHILS # BLD AUTO: 3.6 X10E3/UL (ref 1.4–7)
NEUTROPHILS NFR BLD AUTO: 61 %
NITRITE UR QL STRIP: NEGATIVE
PH UR STRIP: 5 [PH] (ref 5–7.5)
PLATELET # BLD AUTO: 211 X10E3/UL (ref 150–379)
POTASSIUM SERPL-SCNC: 4.9 MMOL/L (ref 3.5–5.2)
PROT SERPL-MCNC: 6.9 G/DL (ref 6–8.5)
PROT UR QL STRIP: NEGATIVE
RBC # BLD AUTO: 5.08 X10E6/UL (ref 3.77–5.28)
RBC #/AREA URNS HPF: NORMAL /HPF
SODIUM SERPL-SCNC: 143 MMOL/L (ref 134–144)
SP GR UR: >=1.03 (ref 1–1.03)
TRIGL SERPL-MCNC: 199 MG/DL (ref 0–149)
TSH SERPL DL<=0.005 MIU/L-ACNC: 2.56 UIU/ML (ref 0.45–4.5)
URINALYSIS REFLEX: ABNORMAL
UROBILINOGEN UR STRIP-MCNC: 0.2 MG/DL (ref 0.2–1)
VLDLC SERPL CALC-MCNC: 40 MG/DL (ref 5–40)
WBC # BLD AUTO: 5.9 X10E3/UL (ref 3.4–10.8)
WBC #/AREA URNS HPF: NORMAL /HPF

## 2019-01-03 ENCOUNTER — OFFICE VISIT (OUTPATIENT)
Dept: INTERNAL MEDICINE | Facility: CLINIC | Age: 64
End: 2019-01-03

## 2019-01-03 VITALS
HEART RATE: 69 BPM | DIASTOLIC BLOOD PRESSURE: 72 MMHG | RESPIRATION RATE: 18 BRPM | WEIGHT: 211 LBS | SYSTOLIC BLOOD PRESSURE: 126 MMHG | OXYGEN SATURATION: 98 % | HEIGHT: 68 IN | BODY MASS INDEX: 31.98 KG/M2

## 2019-01-03 DIAGNOSIS — R91.1 LUNG NODULE, SOLITARY: ICD-10-CM

## 2019-01-03 DIAGNOSIS — Z12.31 ENCOUNTER FOR SCREENING MAMMOGRAM FOR BREAST CANCER: ICD-10-CM

## 2019-01-03 DIAGNOSIS — R79.89 ABNORMAL LIVER FUNCTION TESTS: ICD-10-CM

## 2019-01-03 DIAGNOSIS — E78.2 MIXED HYPERLIPIDEMIA: ICD-10-CM

## 2019-01-03 DIAGNOSIS — E11.9 TYPE 2 DIABETES MELLITUS WITHOUT COMPLICATION, WITHOUT LONG-TERM CURRENT USE OF INSULIN (HCC): ICD-10-CM

## 2019-01-03 DIAGNOSIS — I10 ESSENTIAL HYPERTENSION: ICD-10-CM

## 2019-01-03 DIAGNOSIS — Z00.00 HEALTH MAINTENANCE EXAMINATION: Primary | ICD-10-CM

## 2019-01-03 PROCEDURE — 99396 PREV VISIT EST AGE 40-64: CPT | Performed by: INTERNAL MEDICINE

## 2019-01-03 NOTE — PROGRESS NOTES
Chief Complaint  Namrata Luz is a 63 y.o. female who presents for Annual Exam (CPE)  .    History of Present Illness   Namrata is here for her CPE.  She is in a better place mentally.  She has been seeing a good therapist.  In general, she is feeling good.  She has an appt with  for her lung nodule.  He is following this.  She missed her 6 mo appt due to her knee surgery.    Mammo: Has not had one this year.     Pap: n/a s/p hysterectomy  C-scope: 1/1/2015, repeat in 10 years.     Exercise: Tries to stay, but does not get much cardio.    Immunization History   Administered Date(s) Administered   • Flu Mist 10/02/2013, 10/01/2014, 10/07/2015   • Flu Vaccine Quad PF >18YRS 09/22/2016, 12/12/2017   • Hepatitis A 05/12/2018, 12/04/2018   • Influenza, Unspecified 12/04/2018   • Pneumococcal Polysaccharide (PPSV23) 04/17/2014   • Td 10/02/2005   • Tdap 04/17/2014   • Zostavax 08/24/2012       Review of Systems   Constitution: Negative for chills, fever and malaise/fatigue.   HENT: Positive for tinnitus (chronic, not new ). Negative for hearing loss, hoarse voice and sore throat.    Eyes: Negative for blurred vision, double vision and visual disturbance.   Cardiovascular: Negative for chest pain, leg swelling and palpitations.   Respiratory: Negative for cough and shortness of breath.    Endocrine: Negative for polydipsia and polyuria.   Hematologic/Lymphatic: Does not bruise/bleed easily.   Skin: Negative for rash and suspicious lesions.   Musculoskeletal: Positive for arthritis and joint pain. Negative for myalgias.   Gastrointestinal: Negative for bloating, abdominal pain, change in bowel habit, constipation, diarrhea, dysphagia, hematochezia, melena, nausea and vomiting.   Genitourinary: Negative for dysuria and hematuria.   Neurological: Negative for dizziness, headaches and light-headedness.   Psychiatric/Behavioral: Negative for depression. The patient is not nervous/anxious.        Patient Active  Problem List   Diagnosis   • Abnormal liver function tests   • Anxiety   • Asthma   • Mixed anxiety depressive disorder   • Headache   • Hyperlipidemia   • Hypertension   • Hypothyroidism   • Lung nodule, solitary   • Neck pain   • Psoriasis   • Vitamin D deficiency   • Right medial knee pain   • Effusion of right knee   • Choking   • Type 2 diabetes mellitus without complication (CMS/HCC)   • Hoarseness   • Chronic pain of left knee       Past Medical History:   Diagnosis Date   • Abnormal LFTs    • Allergic rhinitis    • Anxiety    • Arthritis    • Asthma    • Depression    • Diabetes mellitus (CMS/HCC)     PRE DIABETES   • Disease of thyroid gland    • Endometriosis    • Fatty liver    • Fractures    • Hyperlipidemia    • Hypertension     ON TOPROL FOR MVP   • Limited joint range of motion     LT KNEE   • Lung nodule    • Mitral valve prolapse    • Prediabetes    • Psoriasis    • Renal insufficiency    • Sleep apnea     USES C-PAP   • Tachycardia    • Vitamin D deficiency        Past Surgical History:   Procedure Laterality Date   • BACK SURGERY     • CATARACT EXTRACTION, BILATERAL Bilateral    • CHOLECYSTECTOMY     • HYSTERECTOMY     • KNEE ARTHROPLASTY UNICOMPARTMENTAL Left 2018    Procedure: KNEE ARTHROPLASTY UNICOMPARTMENTAL;  Surgeon: Emanuel Jack MD;  Location: [facility];  Service: Orthopedics   • LUMBAR DISCECTOMY     • TIBIA FASCIOTOMY Left    • TUBAL ABDOMINAL LIGATION Bilateral        Family History   Problem Relation Age of Onset   • Vaginal cancer Mother    • Lung cancer Mother    • Arthritis Mother    • Diabetes Mother            • Cancer Mother    • Coronary artery disease Father    • Arthritis Father    • Kidney cancer Father    • Cancer Father    • Coronary artery disease Brother    • Asthma Sister    • Osteoporosis Sister    • Osteoporosis Maternal Grandmother    • Osteoporosis Sister    • Malig Hyperthermia Neg Hx        Social History      Socioeconomic History   • Marital status:      Spouse name: Not on file   • Number of children: Not on file   • Years of education: Not on file   • Highest education level: Not on file   Social Needs   • Financial resource strain: Not on file   • Food insecurity - worry: Not on file   • Food insecurity - inability: Not on file   • Transportation needs - medical: Not on file   • Transportation needs - non-medical: Not on file   Occupational History   • Not on file   Tobacco Use   • Smoking status: Never Smoker   • Smokeless tobacco: Never Used   • Tobacco comment: lots of second hand smoke during childhood   Substance and Sexual Activity   • Alcohol use: Yes     Frequency: Monthly or less     Comment: maybe have 1 drink every couple of months   • Drug use: No   • Sexual activity: Not Currently     Partners: Male     Birth control/protection: Post-menopausal   Other Topics Concern   • Not on file   Social History Narrative   • Not on file       Current Outpatient Medications on File Prior to Visit   Medication Sig Dispense Refill   • ACCU-CHEK KARMA PLUS test strip USE AS DIRECTED TO TEST BLOOD SUGAR DAILY 100 each 1   • ACCU-CHEK SOFTCLIX LANCETS lancets USE AS DIRECTED TO TEST BLOOD SUGAR DAILY 100 each 1   • albuterol (PROVENTIL HFA;VENTOLIN HFA) 108 (90 BASE) MCG/ACT inhaler Inhale 2 puffs Every 4 (Four) Hours As Needed.     • atorvastatin (LIPITOR) 40 MG tablet Take 40 mg by mouth Every Morning.     • clobetasol (TEMOVATE) 0.05 % external solution Apply  topically 2 (Two) Times a Day. (Patient taking differently: Apply 1 application topically As Needed.) 50 mL 2   • Empagliflozin (JARDIANCE) 25 MG tablet Take 25 mg by mouth Daily. (Patient taking differently: Take 25 mg by mouth Every Morning.) 90 tablet 3   • fenofibrate (TRICOR) 145 MG tablet Take 145 mg by mouth Every Evening.     • fluocinolone (SYNALAR) 0.01 % external solution Apply  topically 2 (Two) Times a Day. (Patient taking differently:  "Apply 1 application topically As Needed.) 60 mL 2   • FLUoxetine (PROzac) 10 MG capsule TAKE 1 CAPSULE DAILY 90 capsule 3   • levothyroxine (SYNTHROID, LEVOTHROID) 88 MCG tablet TAKE 1 TABLET DAILY 90 tablet 1   • meloxicam (MOBIC) 15 MG tablet 1 PO Daily with food. 30 tablet 3   • metFORMIN (GLUCOPHAGE) 1000 MG tablet Take 1,000 mg by mouth 2 (Two) Times a Day With Meals.     • metoprolol succinate XL (TOPROL-XL) 100 MG 24 hr tablet Take 100 mg by mouth Every Morning.     • [DISCONTINUED] clindamycin (CLEOCIN) 300 MG capsule Take 2 P0 1 hour prior to dental work 2 capsule 6   • [DISCONTINUED] budesonide-formoterol (SYMBICORT) 80-4.5 MCG/ACT inhaler Inhale 2 puffs 2 (two) times a day. (Patient taking differently: Inhale 2 puffs As Needed.) 1 inhaler 2   • [DISCONTINUED] Cholecalciferol (VITAMIN D) 1000 units tablet Take 1,000 Units by mouth Every Morning.       No current facility-administered medications on file prior to visit.        Allergies   Allergen Reactions   • Morphine And Related Hives   • Penicillins Hives       Vitals:    01/03/19 0900   BP: 126/72   Pulse: 69   Resp: 18   SpO2: 98%   Weight: 95.7 kg (211 lb)   Height: 172.7 cm (67.99\")       Body mass index is 32.09 kg/m².    Objective   Physical Exam   Constitutional: She is oriented to person, place, and time. She appears well-developed and well-nourished. No distress.   HENT:   Head: Normocephalic and atraumatic.   Nose: Nose normal.   Mouth/Throat: Oropharynx is clear and moist.   Eyes: Conjunctivae and EOM are normal. Pupils are equal, round, and reactive to light. No scleral icterus.   Neck: Normal range of motion. Neck supple. No thyromegaly present.   Cardiovascular: Normal rate, regular rhythm and normal heart sounds. Exam reveals no gallop and no friction rub.   No murmur heard.  Pulses:       Carotid pulses are 2+ on the right side, and 2+ on the left side.       Femoral pulses are 2+ on the right side, and 2+ on the left side.       " Dorsalis pedis pulses are 2+ on the right side, and 2+ on the left side.        Posterior tibial pulses are 2+ on the right side, and 2+ on the left side.   Pulmonary/Chest: Effort normal and breath sounds normal. No respiratory distress. She has no wheezes. She has no rales. Right breast exhibits no mass and no nipple discharge. Left breast exhibits no mass and no nipple discharge.   Abdominal: Soft. Bowel sounds are normal. She exhibits no distension and no mass. There is no tenderness.   Musculoskeletal: Normal range of motion. She exhibits no edema.    Namrata had a diabetic foot exam performed today.  Vascular Status -  Her right foot exhibits normal foot vasculature  and no edema. Her left foot exhibits normal foot vasculature  and no edema.  Skin Integrity  -  Her right foot skin is intact.Her left foot skin is intact..  Lymphadenopathy:     She has no cervical adenopathy.     She has no axillary adenopathy.        Right: No inguinal and no supraclavicular adenopathy present.        Left: No inguinal and no supraclavicular adenopathy present.   Neurological: She is alert and oriented to person, place, and time. She has normal reflexes. No cranial nerve deficit.   Skin: Skin is warm and dry.   Psychiatric: She has a normal mood and affect. Her speech is normal and behavior is normal. Judgment and thought content normal. Cognition and memory are normal.   Vitals reviewed.        Results for orders placed or performed in visit on 12/31/18   Comprehensive Metabolic Panel   Result Value Ref Range    Glucose 132 (H) 65 - 99 mg/dL    BUN 16 8 - 27 mg/dL    Creatinine 0.85 0.57 - 1.00 mg/dL    eGFR Non African Am 73 >59 mL/min/1.73    eGFR African Am 84 >59 mL/min/1.73    BUN/Creatinine Ratio 19 12 - 28    Sodium 143 134 - 144 mmol/L    Potassium 4.9 3.5 - 5.2 mmol/L    Chloride 103 96 - 106 mmol/L    Total CO2 22 20 - 29 mmol/L    Calcium 9.7 8.7 - 10.3 mg/dL    Total Protein 6.9 6.0 - 8.5 g/dL    Albumin 4.5 3.6 -  4.8 g/dL    Globulin 2.4 1.5 - 4.5 g/dL    A/G Ratio 1.9 1.2 - 2.2    Total Bilirubin 0.6 0.0 - 1.2 mg/dL    Alkaline Phosphatase 84 39 - 117 IU/L    AST (SGOT) 55 (H) 0 - 40 IU/L    ALT (SGPT) 62 (H) 0 - 32 IU/L   Urinalysis With Culture If Indicated -   Result Value Ref Range    Specific Gravity, UA      >=1.030 (A) 1.005 - 1.030    pH, UA 5.0 5.0 - 7.5    Color, UA Yellow Yellow    Appearance, UA Clear Clear    Leukocytes, UA Negative Negative    Protein Negative Negative/Trace    Glucose, UA 3+ (A) Negative    Ketones Negative Negative    Blood, UA Negative Negative    Bilirubin, UA Negative Negative    Urobilinogen, UA 0.2 0.2 - 1.0 mg/dL    Nitrite, UA Negative Negative    Microscopic Examination Comment     MICROSCOPIC EXAMINATION See below:     Urinalysis Reflex Comment    Microscopic Examination -   Result Value Ref Range    WBC, UA 0-5 0 - 5 /hpf    RBC, UA 0-2 0 - 2 /hpf    Epithelial Cells (non renal) 0-10 0 - 10 /hpf    Mucus, UA Present Not Estab.    Bacteria, UA None seen None seen/Few   Lipid Panel   Result Value Ref Range    Total Cholesterol 138 100 - 199 mg/dL    Triglycerides 199 (H) 0 - 149 mg/dL    HDL Cholesterol 36 (L) >39 mg/dL    VLDL Cholesterol 40 5 - 40 mg/dL    LDL Cholesterol  62 0 - 99 mg/dL   Microalbumin / Creatinine Urine Ratio -   Result Value Ref Range    Creatinine, Urine 97.6 Not Estab. mg/dL    Microalbumin, Urine <3.0 Not Estab. ug/mL    Microalbumin/Creatinine Ratio <3.1 0.0 - 30.0 mg/g creat   Hemoglobin A1c   Result Value Ref Range    Hemoglobin A1C 6.7 (H) 4.8 - 5.6 %   Vitamin D 25 Hydroxy   Result Value Ref Range    25 Hydroxy, Vitamin D 20.7 (L) 30.0 - 100.0 ng/mL   TSH Rfx On Abnormal To Free T4   Result Value Ref Range    TSH 2.560 0.450 - 4.500 uIU/mL   CBC & Differential   Result Value Ref Range    WBC 5.9 3.4 - 10.8 x10E3/uL    RBC 5.08 3.77 - 5.28 x10E6/uL    Hemoglobin 15.1 11.1 - 15.9 g/dL    Hematocrit 44.2 34.0 - 46.6 %    MCV 87 79 - 97 fL    MCH 29.7 26.6 -  33.0 pg    MCHC 34.2 31.5 - 35.7 g/dL    RDW 14.9 12.3 - 15.4 %    Platelets 211 150 - 379 x10E3/uL    Neutrophil Rel % 61 Not Estab. %    Lymphocyte Rel % 22 Not Estab. %    Monocyte Rel % 7 Not Estab. %    Eosinophil Rel % 9 Not Estab. %    Basophil Rel % 1 Not Estab. %    Neutrophils Absolute 3.6 1.4 - 7.0 x10E3/uL    Lymphocytes Absolute 1.3 0.7 - 3.1 x10E3/uL    Monocytes Absolute 0.4 0.1 - 0.9 x10E3/uL    Eosinophils Absolute 0.5 (H) 0.0 - 0.4 x10E3/uL    Basophils Absolute 0.0 0.0 - 0.2 x10E3/uL    Immature Granulocyte Rel % 0 Not Estab. %    Immature Grans Absolute 0.0 0.0 - 0.1 x10E3/uL       Assessment/Plan   Diagnoses and all orders for this visit:    Health maintenance examination    Type 2 diabetes mellitus without complication, without long-term current use of insulin (CMS/[facility])    Essential hypertension    Mixed hyperlipidemia    Lung nodule, solitary    Encounter for screening mammogram for breast cancer  -     Mammo Screening Bilateral With CAD; Future    Abnormal liver function tests        Discussion/Summary  Namrata is here for routine CPE.  She is up to date on health maintenance except for mammo.  Order placed for this.  Her DM is controlled.  Her bp is well controlled.  Her LDL is at goal.  LFTs stable.  She is following with Dr. Alegria for her lung nodule.  I have really encouraged her to work on her weight.  She is trying to follow a paleo diet.  Encouraged to make sure she is not consuming more calories than she is burning.  Increase exercise.        Return in about 6 months (around 7/3/2019) for Next scheduled follow up, with nonfasting labs prior.

## 2019-01-04 ENCOUNTER — TRANSCRIBE ORDERS (OUTPATIENT)
Dept: ADMINISTRATIVE | Facility: HOSPITAL | Age: 64
End: 2019-01-04

## 2019-01-04 DIAGNOSIS — Z12.31 VISIT FOR SCREENING MAMMOGRAM: Primary | ICD-10-CM

## 2019-01-29 RX ORDER — FENOFIBRATE 145 MG/1
TABLET, COATED ORAL
Qty: 90 TABLET | Refills: 1 | Status: SHIPPED | OUTPATIENT
Start: 2019-01-29 | End: 2019-09-09 | Stop reason: SDUPTHER

## 2019-02-06 ENCOUNTER — APPOINTMENT (OUTPATIENT)
Dept: MAMMOGRAPHY | Facility: HOSPITAL | Age: 64
End: 2019-02-06

## 2019-04-08 ENCOUNTER — TELEPHONE (OUTPATIENT)
Dept: ORTHOPEDIC SURGERY | Facility: CLINIC | Age: 64
End: 2019-04-08

## 2019-04-08 RX ORDER — EMPAGLIFLOZIN 25 MG/1
TABLET, FILM COATED ORAL
Qty: 30 TABLET | Refills: 11 | Status: SHIPPED | OUTPATIENT
Start: 2019-04-08 | End: 2020-04-27 | Stop reason: SDUPTHER

## 2019-04-15 ENCOUNTER — CLINICAL SUPPORT (OUTPATIENT)
Dept: ORTHOPEDIC SURGERY | Facility: CLINIC | Age: 64
End: 2019-04-15

## 2019-04-15 VITALS — HEIGHT: 67 IN | WEIGHT: 209 LBS | BODY MASS INDEX: 32.8 KG/M2 | TEMPERATURE: 98 F

## 2019-04-15 DIAGNOSIS — M17.11 PRIMARY OSTEOARTHRITIS OF RIGHT KNEE: Primary | ICD-10-CM

## 2019-04-15 PROCEDURE — 20610 DRAIN/INJ JOINT/BURSA W/O US: CPT | Performed by: NURSE PRACTITIONER

## 2019-04-15 RX ORDER — METHYLPREDNISOLONE ACETATE 80 MG/ML
80 INJECTION, SUSPENSION INTRA-ARTICULAR; INTRALESIONAL; INTRAMUSCULAR; SOFT TISSUE
Status: COMPLETED | OUTPATIENT
Start: 2019-04-15 | End: 2019-04-15

## 2019-04-15 RX ORDER — LIDOCAINE HYDROCHLORIDE 20 MG/ML
2 INJECTION, SOLUTION EPIDURAL; INFILTRATION; INTRACAUDAL; PERINEURAL
Status: COMPLETED | OUTPATIENT
Start: 2019-04-15 | End: 2019-04-15

## 2019-04-15 RX ADMIN — METHYLPREDNISOLONE ACETATE 80 MG: 80 INJECTION, SUSPENSION INTRA-ARTICULAR; INTRALESIONAL; INTRAMUSCULAR; SOFT TISSUE at 08:21

## 2019-04-15 RX ADMIN — LIDOCAINE HYDROCHLORIDE 2 ML: 20 INJECTION, SOLUTION EPIDURAL; INFILTRATION; INTRACAUDAL; PERINEURAL at 08:21

## 2019-04-15 NOTE — PROGRESS NOTES
4/15/2019    Namrata Luz is here today for worsening knee pain. Pt has undergone injection of the knee in the past with good resolution of symptoms. Pt is requesting a repeat injection.     KNEE Injection Procedure Note:    Large Joint Arthrocentesis: R knee  Date/Time: 4/15/2019 8:21 AM  Consent given by: patient  Site marked: site marked  Timeout: Immediately prior to procedure a time out was called to verify the correct patient, procedure, equipment, support staff and site/side marked as required   Supporting Documentation  Indications: pain   Procedure Details  Location: knee - R knee  Preparation: Patient was prepped and draped in the usual sterile fashion  Needle size: 22 G  Approach: anterolateral  Medications administered: 2 mL lidocaine PF 2% 2 %; 80 mg methylPREDNISolone acetate 80 MG/ML  Patient tolerance: patient tolerated the procedure well with no immediate complications        Prior to injection risks were discussed including pain, infection, elevated blood sugar.  Patient verbalized understanding would like to proceed with injection  At the conclusion of the injection I discussed the importance of continued quad strengthening exercises on a daily basis. I will see the patient back if the symptoms should fail to improve or worsen.    Holly Smallwood APRN  4/15/2019

## 2019-04-18 RX ORDER — MELOXICAM 15 MG/1
TABLET ORAL
Qty: 30 TABLET | Refills: 2 | Status: SHIPPED | OUTPATIENT
Start: 2019-04-18 | End: 2019-07-28 | Stop reason: SDUPTHER

## 2019-05-09 RX ORDER — LEVOTHYROXINE SODIUM 88 UG/1
TABLET ORAL
Qty: 90 TABLET | Refills: 1 | Status: SHIPPED | OUTPATIENT
Start: 2019-05-09 | End: 2020-05-26 | Stop reason: SDUPTHER

## 2019-05-09 RX ORDER — ATORVASTATIN CALCIUM 40 MG/1
TABLET, FILM COATED ORAL
Qty: 90 TABLET | Refills: 2 | Status: SHIPPED | OUTPATIENT
Start: 2019-05-09 | End: 2022-03-10 | Stop reason: SDUPTHER

## 2019-05-30 ENCOUNTER — OFFICE VISIT (OUTPATIENT)
Dept: ORTHOPEDIC SURGERY | Facility: CLINIC | Age: 64
End: 2019-05-30

## 2019-05-30 VITALS — HEIGHT: 68 IN | BODY MASS INDEX: 30.77 KG/M2 | WEIGHT: 203 LBS | TEMPERATURE: 97.6 F

## 2019-05-30 DIAGNOSIS — Z96.652 S/P LEFT UNICOMPARTMENTAL KNEE REPLACEMENT: Primary | ICD-10-CM

## 2019-05-30 DIAGNOSIS — G89.29 CHRONIC PAIN OF RIGHT KNEE: ICD-10-CM

## 2019-05-30 DIAGNOSIS — M25.561 CHRONIC PAIN OF RIGHT KNEE: ICD-10-CM

## 2019-05-30 PROCEDURE — 99213 OFFICE O/P EST LOW 20 MIN: CPT | Performed by: ORTHOPAEDIC SURGERY

## 2019-05-30 PROCEDURE — 73562 X-RAY EXAM OF KNEE 3: CPT | Performed by: ORTHOPAEDIC SURGERY

## 2019-05-30 NOTE — PROGRESS NOTES
Patient: Namrata Luz  YOB: 1955 63 y.o. female  Medical Record Number: [id number]    Chief Complaints:   Chief Complaint   Patient presents with   • Left Knee - Follow-up   • Right Knee - Establish Care, Pain       History of Present Illness:Namrata Luz is a 63 y.o. female who presents for follow-up of both knees.  Left unicompartmental replacement a year ago doing well.  Right knee is having more intermittent episodes which are fairly severe at times.  She had an injection about a month ago and it still helping.  She continues to work on leg strengthening but has feelings of instability with pain on the medial joint    Allergies:   Allergies   Allergen Reactions   • Morphine And Related Hives   • Penicillins Hives       Medications:   Current Outpatient Medications   Medication Sig Dispense Refill   • ACCU-CHEK KARMA PLUS test strip USE AS DIRECTED TO TEST BLOOD SUGAR DAILY 100 each 1   • ACCU-CHEK SOFTCLIX LANCETS lancets USE AS DIRECTED TO TEST BLOOD SUGAR DAILY 100 each 1   • atorvastatin (LIPITOR) 40 MG tablet TAKE 1 TABLET DAILY AS DIRECTED 90 tablet 2   • clobetasol (TEMOVATE) 0.05 % external solution Apply  topically 2 (Two) Times a Day. (Patient taking differently: Apply 1 application topically As Needed.) 50 mL 2   • fenofibrate (TRICOR) 145 MG tablet TAKE 1 TABLET DAILY 90 tablet 1   • fluocinolone (SYNALAR) 0.01 % external solution Apply  topically 2 (Two) Times a Day. (Patient taking differently: Apply 1 application topically As Needed.) 60 mL 2   • FLUoxetine (PROzac) 10 MG capsule TAKE 1 CAPSULE DAILY 90 capsule 3   • JARDIANCE 25 MG tablet TAKE 1 TABLET BY MOUTH ONCE DAILY 30 tablet 11   • levothyroxine (SYNTHROID, LEVOTHROID) 88 MCG tablet TAKE 1 TABLET DAILY 90 tablet 1   • meloxicam (MOBIC) 15 MG tablet TAKE ONE TABLET BY MOUTH DAILY WITH FOOD 30 tablet 2   • metFORMIN (GLUCOPHAGE) 1000 MG tablet TAKE 1 TABLET TWICE A  tablet 1   • metoprolol succinate XL (TOPROL-XL)  "100 MG 24 hr tablet Take 100 mg by mouth Every Morning.       No current facility-administered medications for this visit.          The following portions of the patient's history were reviewed and updated as appropriate: allergies, current medications, past family history, past medical history, past social history, past surgical history and problem list.    Review of Systems:   A 14 point review of systems was performed. All systems negative except pertinent positives/negative listed in HPI above    Physical Exam:   Vitals:    05/30/19 1537   Temp: 97.6 °F (36.4 °C)   TempSrc: Temporal   Weight: 92.1 kg (203 lb)   Height: 172.7 cm (68\")       General: A and O x 3, ASA, NAD    SCLERA:    Normal    DENTITION:   Normal  Knee:  right    ALIGNMENT:     Varus  ,   Patella  tracks  midline    GAIT:    Antalgic    SKIN:    No abnormality    RANGE OF MOTION:   0  -  120   DEG    STRENGTH:   4  / 5    LIGAMENTS:    No varus / valgus instability.   Negative  Lachman.    MENISCUS:     Negative   Noreen       DISTAL PULSES:    Paplable    DISTAL SENSATION :   Intact    LYMPHATICS:     No   lymphadenopathy    OTHER:          - Positive trace  effusion      - Crepitance with ROM   Knee:  left    ALIGNMENT:     Neutral  ,   Patella  tracks  Midline without crepitance    GAIT:    Nonantalgic    SKIN:    Well healed midline incision, no erythema or fluctuance    RANGE OF MOTION:   0  -  128   DEG    STRENGTH:   5  / 5    LIGAMENTS:    No varus / valgus instability.   No  Flexion   instability.       DISTAL PULSES:    Paplable    DISTAL SENSATION :   Intact    LYMPHATICS:     No   lymphadenopathy    OTHER:     No   Effusion      Calf soft / nontender ,   Negative Karen's sign              Radiology:  Xrays 3views both knees  (ap,lateral, sunrise) were ordered and reviewed for evaluation of knee pain demonstrating right knee advanced varus osteoarthritis with bone on bone articulation, subchondral cysts, and periarticular " osteophytes - slight progression vs last years. Well positioned Left UKA - uncanged vs previous.      Assessment/Plan:  Left UKA  -doing well  Right advanced isolated medial OA- will need UKA in relatively near fututre- continue exercises. RTO 6months, repeat xrays, injection prn

## 2019-06-04 ENCOUNTER — TELEPHONE (OUTPATIENT)
Dept: ORTHOPEDIC SURGERY | Facility: CLINIC | Age: 64
End: 2019-06-04

## 2019-06-04 NOTE — TELEPHONE ENCOUNTER
MLL, is it Clindamycin 330mg take 2 PO 1 hour prior to dental work?  Please advise.  Message taken from [employer].

## 2019-06-04 NOTE — TELEPHONE ENCOUNTER
Regarding: Prescription Question  Contact: [phone]  ----- Message from [employer], Generic sent at 2019  1:50 PM EDT -----    I have dental appointment scheduled on  and need antibiotic to take for the dental cleaning. Knee partial replacement on 2018 with Dr Jack. You were able to send prescription last dental visit. Thank you.     Namrata Luz    55  [phone]

## 2019-06-05 NOTE — TELEPHONE ENCOUNTER
Spoke with patient to confirm pharmacy.  Called pharmacy with antibiotic order.  Pharmacist said that clindamycin comes in 150mg or 300mg.  I said that 300 mg was ok.

## 2019-07-02 ENCOUNTER — APPOINTMENT (OUTPATIENT)
Dept: GENERAL RADIOLOGY | Facility: HOSPITAL | Age: 64
End: 2019-07-02

## 2019-07-02 PROCEDURE — 73502 X-RAY EXAM HIP UNI 2-3 VIEWS: CPT | Performed by: FAMILY MEDICINE

## 2019-07-08 ENCOUNTER — OFFICE VISIT (OUTPATIENT)
Dept: INTERNAL MEDICINE | Facility: CLINIC | Age: 64
End: 2019-07-08

## 2019-07-08 VITALS — HEIGHT: 69 IN | BODY MASS INDEX: 30.87 KG/M2 | DIASTOLIC BLOOD PRESSURE: 70 MMHG | SYSTOLIC BLOOD PRESSURE: 126 MMHG

## 2019-07-08 DIAGNOSIS — R79.89 ABNORMAL LIVER FUNCTION TESTS: ICD-10-CM

## 2019-07-08 DIAGNOSIS — E03.9 ACQUIRED HYPOTHYROIDISM: ICD-10-CM

## 2019-07-08 DIAGNOSIS — E78.2 MIXED HYPERLIPIDEMIA: Primary | ICD-10-CM

## 2019-07-08 DIAGNOSIS — E55.9 VITAMIN D DEFICIENCY: ICD-10-CM

## 2019-07-08 DIAGNOSIS — Z12.31 SCREENING MAMMOGRAM, ENCOUNTER FOR: ICD-10-CM

## 2019-07-08 DIAGNOSIS — E11.9 TYPE 2 DIABETES MELLITUS WITHOUT COMPLICATION, WITHOUT LONG-TERM CURRENT USE OF INSULIN (HCC): ICD-10-CM

## 2019-07-08 DIAGNOSIS — F41.9 ANXIETY: ICD-10-CM

## 2019-07-08 PROBLEM — K75.81 NASH (NONALCOHOLIC STEATOHEPATITIS): Status: ACTIVE | Noted: 2019-07-08

## 2019-07-08 PROCEDURE — 99214 OFFICE O/P EST MOD 30 MIN: CPT | Performed by: PHYSICIAN ASSISTANT

## 2019-07-08 RX ORDER — LISINOPRIL 5 MG/1
5 TABLET ORAL DAILY
Qty: 30 TABLET | Refills: 1 | Status: SHIPPED | OUTPATIENT
Start: 2019-07-08 | End: 2019-09-17 | Stop reason: SDUPTHER

## 2019-07-08 NOTE — PROGRESS NOTES
Subjective   Chief Complaint   Patient presents with   • Hyperlipidemia   • Hypothyroidism       Pt is a 63 year old white female with a hx of elevated lfts, anxiety, hld, htn, lung nodule, hypothyroidism and DM who presents today to establish care as a new patient. She is a former Dr. Sifuentes patient, was last seen in January.     Her diabetes has been well controlled, last a1c in December was below 7. She is up to date on her eye exam, urine microalbumin and foot exam. No polys. Checks her sugars, no episodes of hypo. She has no myalgias with her current statin dose, her last LDL was to goal at 62. She is not taking an ace/arb at this time. No hx of intolerance to them.     Hx of vitamin D deficiency, was low in December started back on vitamin D supplement. Has not yet had her mammogram this year, has had to reschedule. She is up to date on her colonoscopy, 2015 to repeat in 2025. Status post hysterectomy.     She does have a 7 mm lung nodule that is being followed by Dr. Alegria, no cough, night sweats or weight loss.           Patient Active Problem List   Diagnosis   • Abnormal liver function tests   • Anxiety   • Asthma   • Mixed anxiety depressive disorder   • Headache   • Hyperlipidemia   • Hypertension   • Hypothyroidism   • Lung nodule, solitary   • Neck pain   • Psoriasis   • Vitamin D deficiency   • Right medial knee pain   • Effusion of right knee   • Choking   • Type 2 diabetes mellitus without complication (CMS/HCC)   • Hoarseness   • Chronic pain of left knee       Allergies   Allergen Reactions   • Morphine And Related Hives   • Penicillins Hives       Current Outpatient Medications on File Prior to Visit   Medication Sig Dispense Refill   • ACCU-CHEK KARMA PLUS test strip USE AS DIRECTED TO TEST BLOOD SUGAR DAILY 100 each 1   • ACCU-CHEK SOFTCLIX LANCETS lancets USE AS DIRECTED TO TEST BLOOD SUGAR DAILY 100 each 1   • atorvastatin (LIPITOR) 40 MG tablet TAKE 1 TABLET DAILY AS DIRECTED 90 tablet 2    • clobetasol (TEMOVATE) 0.05 % external solution Apply  topically 2 (Two) Times a Day. (Patient taking differently: Apply 1 application topically As Needed.) 50 mL 2   • fenofibrate (TRICOR) 145 MG tablet TAKE 1 TABLET DAILY 90 tablet 1   • FLUoxetine (PROzac) 10 MG capsule TAKE 1 CAPSULE DAILY 90 capsule 3   • JARDIANCE 25 MG tablet TAKE 1 TABLET BY MOUTH ONCE DAILY 30 tablet 11   • levothyroxine (SYNTHROID, LEVOTHROID) 88 MCG tablet TAKE 1 TABLET DAILY 90 tablet 1   • meloxicam (MOBIC) 15 MG tablet TAKE ONE TABLET BY MOUTH DAILY WITH FOOD 30 tablet 2   • metFORMIN (GLUCOPHAGE) 1000 MG tablet TAKE 1 TABLET TWICE A  tablet 1   • metoprolol succinate XL (TOPROL-XL) 100 MG 24 hr tablet Take 100 mg by mouth Every Morning.     • fluocinolone (SYNALAR) 0.01 % external solution Apply  topically 2 (Two) Times a Day. (Patient taking differently: Apply 1 application topically As Needed.) 60 mL 2     No current facility-administered medications on file prior to visit.        Past Medical History:   Diagnosis Date   • Abnormal LFTs    • Allergic rhinitis    • Anxiety    • Arthritis    • Asthma    • Depression    • Diabetes mellitus (CMS/HCC)     PRE DIABETES   • Disease of thyroid gland    • Endometriosis    • Fatty liver    • Fractures    • Hyperlipidemia    • Hypertension     ON TOPROL FOR MVP   • Limited joint range of motion     LT KNEE   • Lung nodule    • Mitral valve prolapse    • Prediabetes    • Psoriasis    • Renal insufficiency    • Sleep apnea     USES C-PAP   • Tachycardia    • Vitamin D deficiency        Family History   Problem Relation Age of Onset   • Vaginal cancer Mother    • Lung cancer Mother    • Arthritis Mother    • Diabetes Mother            • Cancer Mother         Lung   • Coronary artery disease Father    • Arthritis Father    • Kidney cancer Father    • Cancer Father         Kidney   • Coronary artery disease Brother    • Asthma Sister    • Osteoporosis Sister    • Osteoporosis  Maternal Grandmother            • Osteoporosis Sister         Being treated   • Malig Hyperthermia Neg Hx        Social History     Socioeconomic History   • Marital status:      Spouse name: Not on file   • Number of children: Not on file   • Years of education: Not on file   • Highest education level: Not on file   Tobacco Use   • Smoking status: Never Smoker   • Smokeless tobacco: Never Used   • Tobacco comment: lots of second hand smoke during childhood   Substance and Sexual Activity   • Alcohol use: Yes     Frequency: Monthly or less     Comment: maybe have 1 drink every couple of months   • Drug use: No   • Sexual activity: Not Currently     Partners: Male     Birth control/protection: Post-menopausal       Past Surgical History:   Procedure Laterality Date   • BACK SURGERY     • CATARACT EXTRACTION, BILATERAL Bilateral    • CHOLECYSTECTOMY  'S   • HYSTERECTOMY     • KNEE ARTHROPLASTY UNICOMPARTMENTAL Left 2018    Procedure: KNEE ARTHROPLASTY UNICOMPARTMENTAL;  Surgeon: Emanuel Jack MD;  Location: [facility];  Service: Orthopedics   • LUMBAR DISCECTOMY     • TIBIA FASCIOTOMY Left    • TUBAL ABDOMINAL LIGATION Bilateral        PHQ-9 Depression Screening  Little interest or pleasure in doing things?     Feeling down, depressed, or hopeless?     Trouble falling or staying asleep, or sleeping too much?     Feeling tired or having little energy?     Poor appetite or overeating?     Feeling bad about yourself - or that you are a failure or have let yourself or your family down?     Trouble concentrating on things, such as reading the newspaper or watching television?     Moving or speaking so slowly that other people could have noticed? Or the opposite - being so fidgety or restless that you have been moving around a lot more than usual?     Thoughts that you would be better off dead, or of hurting yourself in some way?     PHQ-9 Total Score     If you checked  "off any problems, how difficult have these problems made it for you to do your work, take care of things at home, or get along with other people?       The following portions of the patient's history were reviewed and updated as appropriate: problem list, allergies, current medications, past medical history, past family history, past social history and past surgical history.    Review of Systems   Constitution: Negative for chills, fever, night sweats and weight loss.   Cardiovascular: Negative for chest pain and dyspnea on exertion.   Respiratory: Negative for cough, hemoptysis and shortness of breath.    Endocrine: Negative for polydipsia, polyphagia and polyuria.   Neurological: Negative for headaches.   Psychiatric/Behavioral: Negative for depression. The patient is nervous/anxious.        Immunization History   Administered Date(s) Administered   • Flu Mist 10/02/2013, 10/01/2014, 10/07/2015   • Flu Vaccine Quad PF >18YRS 09/22/2016, 12/12/2017   • Hepatitis A 05/12/2018, 12/04/2018   • Influenza, Unspecified 12/04/2018   • Pneumococcal Polysaccharide (PPSV23) 04/17/2014   • Td 10/02/2005   • Tdap 04/17/2014   • Zostavax 08/24/2012       Objective   Vitals:    07/08/19 0826 07/08/19 0850   BP:  126/70   Height: 174 cm (68.5\")      Physical Exam   Constitutional: She is oriented to person, place, and time. She appears well-developed and well-nourished.   HENT:   Head: Normocephalic and atraumatic.   Mouth/Throat: Oropharynx is clear and moist.   Eyes: Conjunctivae and EOM are normal. Pupils are equal, round, and reactive to light.   Neck: Neck supple. Carotid bruit is not present. No thyromegaly present.   Cardiovascular: Normal rate, regular rhythm and normal heart sounds.   No murmur heard.  Pulmonary/Chest: Effort normal and breath sounds normal.   Neurological: She is alert and oriented to person, place, and time.   Skin: Skin is warm and dry.   Psychiatric: She has a normal mood and affect.   Vitals " reviewed.      Assessment/Plan   Namrata was seen today for hyperlipidemia and hypothyroidism.    Diagnoses and all orders for this visit:    Mixed hyperlipidemia  -     Comprehensive Metabolic Panel  -     Lipid Panel With / Chol / HDL Ratio    Vitamin D deficiency  -     Vitamin D 25 Hydroxy    Acquired hypothyroidism  -     TSH    Type 2 diabetes mellitus without complication, without long-term current use of insulin (CMS/[facility])  -     Hemoglobin A1c  -     lisinopril (PRINIVIL,ZESTRIL) 5 MG tablet; Take 1 tablet by mouth Daily.    Screening mammogram, encounter for  -     Mammo Screening Digital Tomosynthesis Bilateral With CAD; Future      BP is great, however needs ace for nephro-protection with her diabetes will start Lisinopril 5 mg daily. Labs today and fup in 6 weeks. I have reviewed all previous records from Dr. Sifuentes.       Return in about 6 weeks (around 8/19/2019).

## 2019-07-09 LAB
25(OH)D3+25(OH)D2 SERPL-MCNC: 40.4 NG/ML (ref 30–100)
ALBUMIN SERPL-MCNC: 4.6 G/DL (ref 3.6–4.8)
ALBUMIN/GLOB SERPL: 1.8 {RATIO} (ref 1.2–2.2)
ALP SERPL-CCNC: 83 IU/L (ref 39–117)
ALT SERPL-CCNC: 46 IU/L (ref 0–32)
AST SERPL-CCNC: 56 IU/L (ref 0–40)
BILIRUB SERPL-MCNC: 0.5 MG/DL (ref 0–1.2)
BUN SERPL-MCNC: 18 MG/DL (ref 8–27)
BUN/CREAT SERPL: 21 (ref 12–28)
CALCIUM SERPL-MCNC: 9.8 MG/DL (ref 8.7–10.3)
CHLORIDE SERPL-SCNC: 102 MMOL/L (ref 96–106)
CHOLEST SERPL-MCNC: 133 MG/DL (ref 100–199)
CHOLEST/HDLC SERPL: 4.2 RATIO (ref 0–4.4)
CO2 SERPL-SCNC: 23 MMOL/L (ref 20–29)
CREAT SERPL-MCNC: 0.84 MG/DL (ref 0.57–1)
GLOBULIN SER CALC-MCNC: 2.5 G/DL (ref 1.5–4.5)
GLUCOSE SERPL-MCNC: 115 MG/DL (ref 65–99)
HBA1C MFR BLD: 6.5 % (ref 4.8–5.6)
HDLC SERPL-MCNC: 32 MG/DL
LDLC SERPL CALC-MCNC: 62 MG/DL (ref 0–99)
POTASSIUM SERPL-SCNC: 5.1 MMOL/L (ref 3.5–5.2)
PROT SERPL-MCNC: 7.1 G/DL (ref 6–8.5)
SODIUM SERPL-SCNC: 143 MMOL/L (ref 134–144)
TRIGL SERPL-MCNC: 193 MG/DL (ref 0–149)
TSH SERPL DL<=0.005 MIU/L-ACNC: 4.44 UIU/ML (ref 0.45–4.5)
VLDLC SERPL CALC-MCNC: 39 MG/DL (ref 5–40)

## 2019-07-29 RX ORDER — MELOXICAM 15 MG/1
TABLET ORAL
Qty: 30 TABLET | Refills: 1 | Status: SHIPPED | OUTPATIENT
Start: 2019-07-29 | End: 2019-09-30 | Stop reason: SDUPTHER

## 2019-08-24 ENCOUNTER — APPOINTMENT (OUTPATIENT)
Dept: MAMMOGRAPHY | Facility: HOSPITAL | Age: 64
End: 2019-08-24

## 2019-09-03 ENCOUNTER — OFFICE VISIT (OUTPATIENT)
Dept: INTERNAL MEDICINE | Facility: CLINIC | Age: 64
End: 2019-09-03

## 2019-09-03 VITALS
DIASTOLIC BLOOD PRESSURE: 70 MMHG | HEIGHT: 69 IN | SYSTOLIC BLOOD PRESSURE: 120 MMHG | WEIGHT: 210 LBS | BODY MASS INDEX: 31.1 KG/M2

## 2019-09-03 DIAGNOSIS — I10 ESSENTIAL HYPERTENSION: Primary | ICD-10-CM

## 2019-09-03 DIAGNOSIS — E78.2 MIXED HYPERLIPIDEMIA: ICD-10-CM

## 2019-09-03 DIAGNOSIS — E03.9 ACQUIRED HYPOTHYROIDISM: ICD-10-CM

## 2019-09-03 DIAGNOSIS — E11.9 TYPE 2 DIABETES MELLITUS WITHOUT COMPLICATION, WITHOUT LONG-TERM CURRENT USE OF INSULIN (HCC): ICD-10-CM

## 2019-09-03 LAB
BUN SERPL-MCNC: 12 MG/DL (ref 8–23)
BUN/CREAT SERPL: 13.8 (ref 7–25)
CALCIUM SERPL-MCNC: 9.7 MG/DL (ref 8.6–10.5)
CHLORIDE SERPL-SCNC: 101 MMOL/L (ref 98–107)
CO2 SERPL-SCNC: 25.6 MMOL/L (ref 22–29)
CREAT SERPL-MCNC: 0.87 MG/DL (ref 0.57–1)
GLUCOSE SERPL-MCNC: 130 MG/DL (ref 65–99)
POTASSIUM SERPL-SCNC: 4.5 MMOL/L (ref 3.5–5.2)
SODIUM SERPL-SCNC: 142 MMOL/L (ref 136–145)

## 2019-09-03 PROCEDURE — 99213 OFFICE O/P EST LOW 20 MIN: CPT | Performed by: PHYSICIAN ASSISTANT

## 2019-09-03 RX ORDER — CLOBETASOL PROPIONATE 0.46 MG/ML
1 SOLUTION TOPICAL AS NEEDED
Qty: 50 ML | Refills: 2 | Status: SHIPPED | OUTPATIENT
Start: 2019-09-03

## 2019-09-03 NOTE — PROGRESS NOTES
Subjective   Chief Complaint   Patient presents with   • Hypertension       FUP on htn. Started on lisinopril 10 mg for diabetes, tolerating well no side effects. Needs refill on her topical steroid for her psoriasis. No complaints today.           Patient Active Problem List   Diagnosis   • Abnormal liver function tests   • Asthma   • Mixed anxiety depressive disorder   • Hyperlipidemia   • Hypertension   • Hypothyroidism   • Lung nodule, solitary   • Psoriasis   • Vitamin D deficiency   • Right medial knee pain   • Type 2 diabetes mellitus without complication (CMS/HCC)   • Chronic pain of left knee   • CAMEJO (nonalcoholic steatohepatitis)       Allergies   Allergen Reactions   • Morphine And Related Hives   • Penicillins Hives       Current Outpatient Medications on File Prior to Visit   Medication Sig Dispense Refill   • ACCU-CHEK KARMA PLUS test strip USE AS DIRECTED TO TEST BLOOD SUGAR DAILY 100 each 1   • ACCU-CHEK SOFTCLIX LANCETS lancets USE AS DIRECTED TO TEST BLOOD SUGAR DAILY 100 each 1   • atorvastatin (LIPITOR) 40 MG tablet TAKE 1 TABLET DAILY AS DIRECTED 90 tablet 2   • fenofibrate (TRICOR) 145 MG tablet TAKE 1 TABLET DAILY 90 tablet 1   • fluocinolone (SYNALAR) 0.01 % external solution Apply  topically 2 (Two) Times a Day. (Patient taking differently: Apply 1 application topically As Needed.) 60 mL 2   • FLUoxetine (PROzac) 10 MG capsule TAKE 1 CAPSULE DAILY 90 capsule 3   • JARDIANCE 25 MG tablet TAKE 1 TABLET BY MOUTH ONCE DAILY 30 tablet 11   • levothyroxine (SYNTHROID, LEVOTHROID) 88 MCG tablet TAKE 1 TABLET DAILY 90 tablet 1   • lisinopril (PRINIVIL,ZESTRIL) 5 MG tablet Take 1 tablet by mouth Daily. 30 tablet 1   • meloxicam (MOBIC) 15 MG tablet TAKE ONE TABLET BY MOUTH DAILY WITH FOOD 30 tablet 1   • metFORMIN (GLUCOPHAGE) 1000 MG tablet TAKE 1 TABLET TWICE A  tablet 1   • metoprolol succinate XL (TOPROL-XL) 100 MG 24 hr tablet Take 100 mg by mouth Every Morning.     • [DISCONTINUED]  clobetasol (TEMOVATE) 0.05 % external solution Apply  topically 2 (Two) Times a Day. (Patient taking differently: Apply 1 application topically As Needed.) 50 mL 2     No current facility-administered medications on file prior to visit.        Past Medical History:   Diagnosis Date   • Allergic rhinitis    • Arthritis    • Asthma    • Endometriosis    • Fractures    • Hyperlipidemia    • Hypertension     ON TOPROL FOR MVP   • Limited joint range of motion     LT KNEE   • Mitral valve prolapse    • CAMEJO (nonalcoholic steatohepatitis)    • Prediabetes    • Psoriasis    • Renal insufficiency    • Sleep apnea     USES C-PAP   • Tachycardia    • Vitamin D deficiency        Family History   Problem Relation Age of Onset   • Vaginal cancer Mother    • Lung cancer Mother    • Arthritis Mother    • Diabetes Mother            • Cancer Mother         Lung   • Coronary artery disease Father    • Arthritis Father    • Kidney cancer Father    • Cancer Father         Kidney   • Coronary artery disease Brother    • Asthma Sister    • Osteoporosis Sister    • Osteoporosis Maternal Grandmother            • Osteoporosis Sister         Being treated   • Malig Hyperthermia Neg Hx        Social History     Socioeconomic History   • Marital status:      Spouse name: Not on file   • Number of children: Not on file   • Years of education: Not on file   • Highest education level: Not on file   Tobacco Use   • Smoking status: Never Smoker   • Smokeless tobacco: Never Used   • Tobacco comment: lots of second hand smoke during childhood   Substance and Sexual Activity   • Alcohol use: Yes     Frequency: Monthly or less     Comment: maybe have 1 drink every couple of months   • Drug use: No   • Sexual activity: Not Currently     Partners: Male     Birth control/protection: Post-menopausal       Past Surgical History:   Procedure Laterality Date   • BACK SURGERY     • CATARACT EXTRACTION, BILATERAL Bilateral     • CHOLECYSTECTOMY  2000'S   • HYSTERECTOMY  1982   • KNEE ARTHROPLASTY UNICOMPARTMENTAL Left 6/1/2018    Procedure: KNEE ARTHROPLASTY UNICOMPARTMENTAL;  Surgeon: Emanuel Jack MD;  Location: [facility];  Service: Orthopedics   • LUMBAR DISCECTOMY  1982   • TIBIA FASCIOTOMY Left 2006   • TUBAL ABDOMINAL LIGATION Bilateral 1981       PHQ-9 Depression Screening  Little interest or pleasure in doing things?     Feeling down, depressed, or hopeless?     Trouble falling or staying asleep, or sleeping too much?     Feeling tired or having little energy?     Poor appetite or overeating?     Feeling bad about yourself - or that you are a failure or have let yourself or your family down?     Trouble concentrating on things, such as reading the newspaper or watching television?     Moving or speaking so slowly that other people could have noticed? Or the opposite - being so fidgety or restless that you have been moving around a lot more than usual?     Thoughts that you would be better off dead, or of hurting yourself in some way?     PHQ-9 Total Score     If you checked off any problems, how difficult have these problems made it for you to do your work, take care of things at home, or get along with other people?       The following portions of the patient's history were reviewed and updated as appropriate: problem list, allergies, current medications and past medical history.    Review of Systems   Cardiovascular: Negative for chest pain.   Neurological: Negative for headaches.       Immunization History   Administered Date(s) Administered   • Flu Mist 10/02/2013, 10/01/2014, 10/07/2015   • Flu Vaccine Quad PF >18YRS 09/22/2016, 12/12/2017   • Hepatitis A 05/12/2018, 12/04/2018   • Influenza, Unspecified 12/04/2018   • Pneumococcal Polysaccharide (PPSV23) 04/17/2014   • Td 10/02/2005   • Tdap 04/17/2014   • Zostavax 08/24/2012       Objective   Vitals:    09/03/19 0757   Weight: 95.3 kg (210 lb)   Height: 174 cm  "(68.5\")     Physical Exam   Constitutional: She appears well-developed and well-nourished.   HENT:   Head: Normocephalic and atraumatic.   Cardiovascular: Normal rate, regular rhythm, S1 normal, S2 normal and normal heart sounds.   Pulmonary/Chest: Effort normal and breath sounds normal.   Vitals reviewed.        Assessment/Plan   Namrata was seen today for hypertension.    Diagnoses and all orders for this visit:    Essential hypertension  -     Basic Metabolic Panel    Acquired hypothyroidism  -     TSH; Future    Type 2 diabetes mellitus without complication, without long-term current use of insulin (CMS/[facility])  -     Comprehensive Metabolic Panel; Future  -     Hemoglobin A1c; Future  -     Microalbumin / Creatinine Urine Ratio - Urine, Clean Catch; Future    Mixed hyperlipidemia  -     Lipid Panel With / Chol / HDL Ratio; Future    Other orders  -     clobetasol (TEMOVATE) 0.05 % external solution; Apply 1 application topically to the appropriate area as directed As Needed (for psoriasis).    tolerating ace inhibitor well, no cough. Will get BMP today and have her fup in 6 months. Diabetes is well controlled.     Return in about 6 months (around 3/3/2020).           "

## 2019-09-04 ENCOUNTER — TELEPHONE (OUTPATIENT)
Dept: INTERNAL MEDICINE | Facility: CLINIC | Age: 64
End: 2019-09-04

## 2019-09-04 NOTE — TELEPHONE ENCOUNTER
----- Message from Sujata Breen PA-C sent at 9/4/2019  8:22 AM EDT -----  Kidney function Is normal with addition of lisinopril

## 2019-09-09 ENCOUNTER — TELEPHONE (OUTPATIENT)
Dept: INTERNAL MEDICINE | Facility: CLINIC | Age: 64
End: 2019-09-09

## 2019-09-09 RX ORDER — FENOFIBRATE 145 MG/1
145 TABLET, COATED ORAL DAILY
Qty: 90 TABLET | Refills: 3 | Status: SHIPPED | OUTPATIENT
Start: 2019-09-09 | End: 2020-08-21 | Stop reason: SDUPTHER

## 2019-09-09 NOTE — TELEPHONE ENCOUNTER
Patient called stating that she gets all her medications under her old doctor and needs them to be prescribed through [facility], because [facility] are not refilling them, did call patient back and left message to see which rx she needs filled

## 2019-09-15 ENCOUNTER — HOSPITAL ENCOUNTER (EMERGENCY)
Facility: HOSPITAL | Age: 64
Discharge: HOME | End: 2019-09-15
Attending: EMERGENCY MEDICINE | Admitting: EMERGENCY MEDICINE

## 2019-09-15 VITALS
TEMPERATURE: 96.6 F | OXYGEN SATURATION: 99 % | DIASTOLIC BLOOD PRESSURE: 77 MMHG | SYSTOLIC BLOOD PRESSURE: 128 MMHG | HEIGHT: 68 IN | RESPIRATION RATE: 16 BRPM | BODY MASS INDEX: 30.77 KG/M2 | HEART RATE: 74 BPM | WEIGHT: 203 LBS

## 2019-09-15 DIAGNOSIS — S61.209A AVULSION OF FINGER, INITIAL ENCOUNTER: Primary | ICD-10-CM

## 2019-09-15 PROCEDURE — 99283 EMERGENCY DEPT VISIT LOW MDM: CPT

## 2019-09-15 PROCEDURE — 99284 EMERGENCY DEPT VISIT MOD MDM: CPT

## 2019-09-16 ENCOUNTER — TELEPHONE (OUTPATIENT)
Dept: INTERNAL MEDICINE | Facility: CLINIC | Age: 64
End: 2019-09-16

## 2019-09-16 NOTE — ED NOTES
Avulsion noted to right ring finger at distal knuckle that is [id number].  No bleeding noted at this time.  Wound irrigated with saline.  Surgicel applied.  Pressure dressing applied, secured with coban.  Will continue to monitor for bleeding.       Galilea Saunders RN  09/15/19 6611

## 2019-09-16 NOTE — ED TRIAGE NOTES
To ER via [facility].  C/o avulsion to rt ring finger on glass.  Pt was seen at [facility] and was treated. Dressing in place at this time.  Hemostatic pad placed at [facility], but wound continues to bleed.

## 2019-09-16 NOTE — TELEPHONE ENCOUNTER
Patient called and left message stating that she cut her finger over the weekend really bad. She went to urgent care and the bleeding didn't stop so she went to the E.R yesterday and they did treat her. She states that they told her to follow up with her PCP. She said she did take the day off, she is still bleeding not so bad and she doesn't know if she really needs to come in, if you want her to that will be fine. She just want to know what you suggest. Please read

## 2019-09-16 NOTE — ED PROVIDER NOTES
EMERGENCY DEPARTMENT ENCOUNTER    CHIEF COMPLAINT  Chief Complaint: Laceration   History given by: Patient   History limited by: none   Room Number: 02/02  PMD: Sujata Breen PA-C      HPI:  Pt is a 63 y.o. female who presents complaining of avulsion to the dorsum of the R 4th digit that she obtained at 1715 on a piece of glass. Pt states she has been unable to control bleeding since that time. Per pt, she was seen at  where they used derma-bond, without improvement. Pt states she is R handed.     Duration:  more than 2 hours   Onset: sudden   Timing: constant   Location: R 4th digit   Radiation: none   Quality: avulsion.   Intensity/Severity: mild   Progression: unchanged   Associated Symptoms: none   Aggravating Factors: none   Alleviating Factors: none   Previous Episodes: none   Treatment before arrival: Pt has been attempting to stop bleeding, without improvement.     PAST MEDICAL HISTORY  Active Ambulatory Problems     Diagnosis Date Noted   • Abnormal liver function tests 04/18/2016   • Asthma 04/18/2016   • Mixed anxiety depressive disorder 04/18/2016   • Hyperlipidemia 04/18/2016   • Hypertension 04/18/2016   • Hypothyroidism 04/18/2016   • Lung nodule, solitary 04/18/2016   • Psoriasis 04/18/2016   • Vitamin D deficiency 04/18/2016   • Right medial knee pain 09/22/2016   • Type 2 diabetes mellitus without complication (CMS/HCC) 12/06/2016   • Chronic pain of left knee 05/17/2018   • CAMEJO (nonalcoholic steatohepatitis) 07/08/2019     Resolved Ambulatory Problems     Diagnosis Date Noted   • No Resolved Ambulatory Problems     Past Medical History:   Diagnosis Date   • Allergic rhinitis    • Arthritis    • Asthma    • Endometriosis    • Fractures 2007   • Hyperlipidemia    • Hypertension    • Limited joint range of motion    • Mitral valve prolapse    • CAMEJO (nonalcoholic steatohepatitis)    • Prediabetes    • Psoriasis    • Renal insufficiency    • Sleep apnea    • Tachycardia    • Vitamin D deficiency         PAST SURGICAL HISTORY  Past Surgical History:   Procedure Laterality Date   • BACK SURGERY     • CATARACT EXTRACTION, BILATERAL Bilateral    • CHOLECYSTECTOMY  'S   • HYSTERECTOMY     • KNEE ARTHROPLASTY UNICOMPARTMENTAL Left 2018    Procedure: KNEE ARTHROPLASTY UNICOMPARTMENTAL;  Surgeon: Emanuel Jack MD;  Location: [facility] OR;  Service: Orthopedics   • LUMBAR DISCECTOMY     • TIBIA FASCIOTOMY Left 2006   • TUBAL ABDOMINAL LIGATION Bilateral        FAMILY HISTORY  Family History   Problem Relation Age of Onset   • Vaginal cancer Mother    • Lung cancer Mother    • Arthritis Mother    • Diabetes Mother            • Cancer Mother         Lung   • Coronary artery disease Father    • Arthritis Father    • Kidney cancer Father    • Cancer Father         Kidney   • Coronary artery disease Brother    • Asthma Sister    • Osteoporosis Sister    • Osteoporosis Maternal Grandmother            • Osteoporosis Sister         Being treated   • Malig Hyperthermia Neg Hx        SOCIAL HISTORY  Social History     Socioeconomic History   • Marital status:      Spouse name: Not on file   • Number of children: Not on file   • Years of education: Not on file   • Highest education level: Not on file   Tobacco Use   • Smoking status: Never Smoker   • Smokeless tobacco: Never Used   • Tobacco comment: lots of second hand smoke during childhood   Substance and Sexual Activity   • Alcohol use: Yes     Frequency: Monthly or less     Comment: maybe have 1 drink every couple of months   • Drug use: No   • Sexual activity: Not Currently     Partners: Male     Birth control/protection: Post-menopausal       ALLERGIES  Morphine and related and Penicillins    REVIEW OF SYSTEMS  Review of Systems   Constitutional: Negative for fever.   Respiratory: Negative for shortness of breath.    Cardiovascular: Negative for chest pain.   Skin: Positive for wound (avulsion to 4th digit of R hand ).        PHYSICAL EXAM  ED Triage Vitals   Temp Heart Rate Resp BP SpO2   09/15/19 2116 09/15/19 2116 09/15/19 2119 09/15/19 2133 09/15/19 2116   96.8 °F (36 °C) 84 16 131/66 93 %      Temp src Heart Rate Source Patient Position BP Location FiO2 (%)   09/15/19 2116 09/15/19 2116 09/15/19 2133 09/15/19 2133 --   Tympanic Monitor Sitting Left arm        Physical Exam   Constitutional: She is oriented to person, place, and time. No distress.   Pulmonary/Chest: Effort normal. No respiratory distress.   Neurological: She is alert and oriented to person, place, and time. She has normal sensation.   Skin: Skin is warm and dry.   R hand - Avulsion to the dorsal aspect of the 4th digit    Psychiatric: Affect normal.   Nursing note and vitals reviewed.      Procedures      PROGRESS AND CONSULTS  ED Course as of Sep 15 2254   Sun Sep 15, 2019   2246 10:46 PM  Patient here for avulsion of ring finger.  Dressed with surgicele and pressure dressing and has not rebled.  Rewrapped with a splint to keep her from bending her finger.  Will discharge home.  [SL]      ED Course User Index  [SL] Uriel Bedoya MD     2144: Upon pt exam, discussed plan for control of bleeding with surgicel and pressure bandage. Informed pt of plan for monitoring in ED until hemorrhage stops, and then plan for redress of wound and discharge with finger splint.    2230: Pt rechecked and resting comfortably. Dressing removed and hemostasis achieved. Discussed with pt plan for discharge with wound care directions given. Pt instructed to keep wound clean and dry and wear finger splint for protection. Pt understands and agrees with the plan, all questions answered.      MEDICAL DECISION MAKING  Results were reviewed/discussed with the patient and they were also made aware of online access. Pt also made aware that some labs, such as cultures, will not be resulted during ER visit and follow up with PMD is necessary.     MDM  Number of Diagnoses or Management  Options     Amount and/or Complexity of Data Reviewed  Review and summarize past medical records: yes (4/2014 - tetanus UTD)           DIAGNOSIS  Final diagnoses:   Avulsion of finger, initial encounter       DISPOSITION  DISCHARGE    Patient discharged in stable condition.    Reviewed implications of results, diagnosis, meds, responsibility to follow up, warning signs and symptoms of possible worsening, potential complications and reasons to return to ER.    Patient/Family voiced understanding of above instructions.    Discussed plan for discharge, as there is no emergent indication for admission. Patient referred to primary care provider for BP management due to today's BP. Pt/family is agreeable and understands need for follow up and repeat testing.  Pt is aware that discharge does not mean that nothing is wrong but it indicates no emergency is present that requires admission and they must continue care with follow-up as given below or physician of their choice.     FOLLOW-UP  Sujata Breen, PADebC  [address] [id number] [phone]    Schedule an appointment as soon as possible for a visit            Medication List      Changed    fluocinolone 0.01 % external solution  Commonly known as:  SYNALAR  Apply  topically 2 (Two) Times a Day.  What changed:    how much to take  when to take this  reasons to take this              Latest Documented Vital Signs:  As of 10:54 PM  BP- 128/77 HR- 74 Temp- 96.6 °F (35.9 °C) (Tympanic) O2 sat- 99%    --  Documentation assistance provided by moses Gann for Dr Bedoya.  Information recorded by the scribe was done at my direction and has been verified and validated by me.          Monica Gann  09/15/19 9235       Uriel Bedoya MD  09/15/19 8179

## 2019-09-16 NOTE — ED NOTES
Wound undressed. No bleeding present. Surgicel, splint and tube gauze applied. Pt tolerated well.      Galilea Saunders RN  09/15/19 8482

## 2019-09-17 DIAGNOSIS — E11.9 TYPE 2 DIABETES MELLITUS WITHOUT COMPLICATION, WITHOUT LONG-TERM CURRENT USE OF INSULIN (HCC): ICD-10-CM

## 2019-09-17 RX ORDER — LISINOPRIL 5 MG/1
5 TABLET ORAL DAILY
Qty: 90 TABLET | Refills: 3 | Status: SHIPPED | OUTPATIENT
Start: 2019-09-17 | End: 2020-09-03 | Stop reason: SDUPTHER

## 2019-09-30 DIAGNOSIS — Z96.652 S/P LEFT UNICOMPARTMENTAL KNEE REPLACEMENT: Primary | ICD-10-CM

## 2019-09-30 RX ORDER — MELOXICAM 15 MG/1
TABLET ORAL
Qty: 30 TABLET | Refills: 0 | Status: SHIPPED | OUTPATIENT
Start: 2019-09-30 | End: 2019-11-08 | Stop reason: SDUPTHER

## 2019-10-14 ENCOUNTER — TELEPHONE (OUTPATIENT)
Dept: ORTHOPEDIC SURGERY | Facility: CLINIC | Age: 64
End: 2019-10-14

## 2019-10-15 NOTE — TELEPHONE ENCOUNTER
Left answering machine message for patient to call back to confirm seeing MLL this Thursday, 10/17 @ 9:15 ([facility] office).

## 2019-10-16 NOTE — TELEPHONE ENCOUNTER
Patient called back and she cannot get off work on Thursday to see MLL. She has a hard time with her work schedule. She will tough it out for now and call back if she can work anything else out. Thank-you.

## 2019-10-19 ENCOUNTER — HOSPITAL ENCOUNTER (OUTPATIENT)
Dept: MAMMOGRAPHY | Facility: HOSPITAL | Age: 64
Discharge: HOME | End: 2019-10-19
Admitting: PHYSICIAN ASSISTANT

## 2019-10-19 DIAGNOSIS — Z12.31 SCREENING MAMMOGRAM, ENCOUNTER FOR: ICD-10-CM

## 2019-10-19 PROCEDURE — 77067 SCR MAMMO BI INCL CAD: CPT

## 2019-10-19 PROCEDURE — 77063 BREAST TOMOSYNTHESIS BI: CPT

## 2019-10-31 ENCOUNTER — TELEPHONE (OUTPATIENT)
Dept: INTERNAL MEDICINE | Facility: CLINIC | Age: 64
End: 2019-10-31

## 2019-10-31 NOTE — TELEPHONE ENCOUNTER
Patient called stating she was put on Albuterol in the ER and it does work for her good was wondering if you can put a refill in for her?

## 2019-11-01 DIAGNOSIS — R05.9 COUGH: ICD-10-CM

## 2019-11-01 DIAGNOSIS — J02.9 SORE THROAT: ICD-10-CM

## 2019-11-01 RX ORDER — ALBUTEROL SULFATE 90 UG/1
2 AEROSOL, METERED RESPIRATORY (INHALATION) EVERY 4 HOURS PRN
Qty: 1 INHALER | Refills: 0 | Status: SHIPPED | OUTPATIENT
Start: 2019-11-01 | End: 2022-03-08

## 2019-11-08 DIAGNOSIS — Z96.652 S/P LEFT UNICOMPARTMENTAL KNEE REPLACEMENT: ICD-10-CM

## 2019-11-08 RX ORDER — MELOXICAM 15 MG/1
TABLET ORAL
Qty: 30 TABLET | Refills: 0 | Status: SHIPPED | OUTPATIENT
Start: 2019-11-08 | End: 2019-12-23

## 2019-12-05 ENCOUNTER — OFFICE VISIT (OUTPATIENT)
Dept: ORTHOPEDIC SURGERY | Facility: CLINIC | Age: 64
End: 2019-12-05

## 2019-12-05 VITALS — BODY MASS INDEX: 29.25 KG/M2 | WEIGHT: 193 LBS | HEIGHT: 68 IN

## 2019-12-05 DIAGNOSIS — M25.561 CHRONIC PAIN OF RIGHT KNEE: Primary | ICD-10-CM

## 2019-12-05 DIAGNOSIS — G89.29 CHRONIC PAIN OF RIGHT KNEE: Primary | ICD-10-CM

## 2019-12-05 DIAGNOSIS — M17.11 PRIMARY OSTEOARTHRITIS OF RIGHT KNEE: ICD-10-CM

## 2019-12-05 PROCEDURE — 73562 X-RAY EXAM OF KNEE 3: CPT | Performed by: ORTHOPAEDIC SURGERY

## 2019-12-05 PROCEDURE — 99214 OFFICE O/P EST MOD 30 MIN: CPT | Performed by: ORTHOPAEDIC SURGERY

## 2019-12-05 PROCEDURE — 20610 DRAIN/INJ JOINT/BURSA W/O US: CPT | Performed by: ORTHOPAEDIC SURGERY

## 2019-12-05 RX ORDER — METHYLPREDNISOLONE ACETATE 80 MG/ML
80 INJECTION, SUSPENSION INTRA-ARTICULAR; INTRALESIONAL; INTRAMUSCULAR; SOFT TISSUE
Status: COMPLETED | OUTPATIENT
Start: 2019-12-05 | End: 2019-12-05

## 2019-12-05 RX ADMIN — METHYLPREDNISOLONE ACETATE 80 MG: 80 INJECTION, SUSPENSION INTRA-ARTICULAR; INTRALESIONAL; INTRAMUSCULAR; SOFT TISSUE at 16:53

## 2019-12-05 NOTE — PROGRESS NOTES
Patient: Namrata Luz  YOB: 1955 64 y.o. female  Medical Record Number: [id number]    Chief Complaints:   Chief Complaint   Patient presents with   • Right Knee - Follow-up, Pain       History of Present Illness:Namrata Luz is a 64 y.o. female who presents for follow-up of  Right knee she has worsening medial knee pain at times is severe.  Pain now limits activities.  Worse with weightbearing activity.  It has worsened over the past 6 months.    Allergies:   Allergies   Allergen Reactions   • Morphine And Related Hives   • Penicillins Hives   • Morphine Hives       Medications:   Current Outpatient Medications   Medication Sig Dispense Refill   • albuterol sulfate  (90 Base) MCG/ACT inhaler Inhale 2 puffs Every 4 (Four) Hours As Needed for Wheezing. 1 inhaler 0   • atorvastatin (LIPITOR) 40 MG tablet TAKE 1 TABLET DAILY AS DIRECTED 90 tablet 2   • clobetasol (TEMOVATE) 0.05 % external solution Apply 1 application topically to the appropriate area as directed As Needed (for psoriasis). 50 mL 2   • fenofibrate (TRICOR) 145 MG tablet Take 1 tablet by mouth Daily. 90 tablet 3   • fluocinolone (SYNALAR) 0.01 % external solution Apply  topically 2 (Two) Times a Day. (Patient taking differently: Apply 1 application topically As Needed.) 60 mL 2   • FLUoxetine (PROzac) 10 MG capsule TAKE 1 CAPSULE DAILY 90 capsule 3   • fluticasone (FLONASE) 50 MCG/ACT nasal spray 2 sprays into the nostril(s) as directed by provider Daily. 1 bottle 0   • JARDIANCE 25 MG tablet TAKE 1 TABLET BY MOUTH ONCE DAILY 30 tablet 11   • levothyroxine (SYNTHROID, LEVOTHROID) 88 MCG tablet TAKE 1 TABLET DAILY 90 tablet 1   • lisinopril (PRINIVIL,ZESTRIL) 5 MG tablet Take 1 tablet by mouth Daily. 90 tablet 3   • meloxicam (MOBIC) 15 MG tablet TAKE ONE TABLET BY MOUTH DAILY WITH FOOD 30 tablet 0   • metFORMIN (GLUCOPHAGE) 1000 MG tablet TAKE 1 TABLET TWICE A  tablet 1   • metoprolol succinate XL (TOPROL-XL) 100 MG 24 hr  "tablet Take 100 mg by mouth Every Morning.     • ACCU-CHEK KARMA PLUS test strip USE AS DIRECTED TO TEST BLOOD SUGAR DAILY 100 each 1   • ACCU-CHEK SOFTCLIX LANCETS lancets USE AS DIRECTED TO TEST BLOOD SUGAR DAILY 100 each 1     No current facility-administered medications for this visit.          The following portions of the patient's history were reviewed and updated as appropriate: allergies, current medications, past family history, past medical history, past social history, past surgical history and problem list.    Review of Systems:   A 14 point review of systems was performed. All systems negative except pertinent positives/negative listed in HPI above    Physical Exam:   Vitals:    12/05/19 1635   Weight: 87.5 kg (193 lb)   Height: 172.7 cm (68\")       General: A and O x 3, ASA, NAD    SCLERA:    Normal    DENTITION:   Normal  Knee:  right    ALIGNMENT:     Varus  ,   Patella  tracks  midline    GAIT:    Antalgic    SKIN:    No abnormality    RANGE OF MOTION:   0  -  125   DEG    STRENGTH:   4  / 5    LIGAMENTS:    No varus / valgus instability.   Negative  Lachman.    MENISCUS:     Negative   Noreen       DISTAL PULSES:    Paplable    DISTAL SENSATION :   Intact    LYMPHATICS:     No   lymphadenopathy    OTHER:          - Positive   effusion      - Crepitance with ROM       Radiology:  Xrays 3views right knee  (ap,lateral, sunrise) recently taken at outside institution were reviewed demonstratingadvanced varus osteoarthritis with bone on bone articulation, subchondral cysts, and periarticular osteophytes - medial jonit space bone on bone (isolated to medial compartment)  todays xrays were compared to previous xrays and show new findings as described above - progression    Assessment/Plan:  Right knee medial isolated OA.  Continuation of conservative management vs. UKA vs TKA discussed. After stating understanding of the procedures and associated risks / benefit / alternatives of the various options,  " the patient wishes to proceed with unicompartmental knee replacement.  At this point the patient has failed the full gamut of conservative treatment and stating complete understanding of the risks / benefits / anternatives wishes to proceed with surgical treatment.    The patient understands that no guarantees have been made with regard to outcomes of this procedure and that there is a possibility that future surgery is a possibility including conversion to total knee replacement should further disease progression occur in other compartments of the knee.    Risk and benefits of surgery were reviewed.  Including, but not limited to, blood clots or pulmonary embolism, anesthesia risk, infection, fracture, skin/leg numbness, persistent pain/crepitance/popping/catching, failure of the implant, need for future surgeries, hematoma, possible nerve or blood vessel injury, need for transfusion, and potential risk of stroke,heart attack or death, among others.  The patient understands and wishes to proceed.     It was explained that if tissue has been repaired or reconstructed, there is also an increased chance of failure which may require further management.  Following the completion of the discussion, the patient expressed understanding of this planned course of care, all their questions were answered and consent will be obtained preoperatively.    Operative Plan:  Right Humphreys and Nephew/ Tamiko unicompartmental knee replacement performing the procedure on an outpatient basiswith home health rehab - will call to schedule-if it is more than 6 months from now I would like to see her back re-x-ray reexamine her before considering partial versus total.  She wants to try and buy some more time for now so today we injected the right knee as below.  Large Joint Arthrocentesis: R knee  Date/Time: 12/5/2019 4:53 PM  Consent given by: patient  Site marked: site marked  Timeout: Immediately prior to procedure a time out was called to  verify the correct patient, procedure, equipment, support staff and site/side marked as required   Supporting Documentation  Indications: pain and joint swelling   Procedure Details  Location: knee - R knee  Preparation: Patient was prepped and draped in the usual sterile fashion  Needle gauge: 21 G.  Approach: anterolateral  Medications administered: 2 mL lidocaine (cardiac); 80 mg methylPREDNISolone acetate 80 MG/ML  Patient tolerance: patient tolerated the procedure well with no immediate complications

## 2019-12-23 DIAGNOSIS — Z96.652 S/P LEFT UNICOMPARTMENTAL KNEE REPLACEMENT: ICD-10-CM

## 2019-12-23 RX ORDER — MELOXICAM 15 MG/1
TABLET ORAL
Qty: 30 TABLET | Refills: 0 | Status: SHIPPED | OUTPATIENT
Start: 2019-12-23 | End: 2020-03-05

## 2020-01-13 DIAGNOSIS — I10 ESSENTIAL HYPERTENSION: Primary | ICD-10-CM

## 2020-01-13 RX ORDER — METOPROLOL SUCCINATE 100 MG/1
100 TABLET, EXTENDED RELEASE ORAL EVERY MORNING
Qty: 90 TABLET | Refills: 3 | Status: SHIPPED | OUTPATIENT
Start: 2020-01-13 | End: 2020-10-26

## 2020-01-20 ENCOUNTER — TELEPHONE (OUTPATIENT)
Dept: ORTHOPEDIC SURGERY | Facility: CLINIC | Age: 65
End: 2020-01-20

## 2020-01-20 DIAGNOSIS — Z96.652 S/P LEFT UNICOMPARTMENTAL KNEE REPLACEMENT: ICD-10-CM

## 2020-01-20 RX ORDER — MELOXICAM 15 MG/1
15 TABLET ORAL DAILY
Qty: 30 TABLET | Refills: 0 | Status: SHIPPED | OUTPATIENT
Start: 2020-01-20 | End: 2020-03-04

## 2020-01-20 NOTE — TELEPHONE ENCOUNTER
[employer] MESSAGE       ----- Message from Namrata Luz sent at 2020  9:58 AM EST -----  Regarding: Prescription Question  Contact: [phone]  I need a refill on the Meloxican.      Thank you.    Namrata Luz   [phone]    [55

## 2020-01-20 NOTE — TELEPHONE ENCOUNTER
----- Message from Namrata Luz sent at 2020  9:58 AM EST -----  Regarding: Prescription Question  Contact: [phone]  I need a refill on the Meloxican.      Thank you.    Namrata Luz   [phone]    [55

## 2020-01-31 ENCOUNTER — TELEPHONE (OUTPATIENT)
Dept: ORTHOPEDIC SURGERY | Facility: CLINIC | Age: 65
End: 2020-01-31

## 2020-01-31 RX ORDER — CLINDAMYCIN HYDROCHLORIDE 300 MG/1
CAPSULE ORAL
Qty: 2 CAPSULE | Refills: 2 | Status: SHIPPED | OUTPATIENT
Start: 2020-01-31 | End: 2020-03-04

## 2020-01-31 NOTE — TELEPHONE ENCOUNTER
----- Message from Namrata Luz sent at 2020  7:56 PM EST -----  Regarding: Prescription Question  Contact: [phone]  I have a dentist appointment on . Do I get my antibiotic for the exam from you or the dentist?    I guess i still need to take the dose before cleaning procedure.     Thank you,  Namrata Luz    55  [phone]

## 2020-01-31 NOTE — TELEPHONE ENCOUNTER
Have E-prescribed a new RX to [facility] at [phone] for Clindamycin 300mg, #2, take both caps 1 hour prior to procedure.  Refill X2 per RBB. AMB

## 2020-02-03 ENCOUNTER — RESULTS ENCOUNTER (OUTPATIENT)
Dept: INTERNAL MEDICINE | Facility: CLINIC | Age: 65
End: 2020-02-03

## 2020-02-03 DIAGNOSIS — E11.9 TYPE 2 DIABETES MELLITUS WITHOUT COMPLICATION, WITHOUT LONG-TERM CURRENT USE OF INSULIN (HCC): ICD-10-CM

## 2020-02-03 DIAGNOSIS — E03.9 ACQUIRED HYPOTHYROIDISM: ICD-10-CM

## 2020-02-03 DIAGNOSIS — E78.2 MIXED HYPERLIPIDEMIA: ICD-10-CM

## 2020-03-04 ENCOUNTER — OFFICE VISIT (OUTPATIENT)
Dept: INTERNAL MEDICINE | Facility: CLINIC | Age: 65
End: 2020-03-04

## 2020-03-04 VITALS
SYSTOLIC BLOOD PRESSURE: 122 MMHG | HEIGHT: 68 IN | DIASTOLIC BLOOD PRESSURE: 70 MMHG | BODY MASS INDEX: 30.58 KG/M2 | WEIGHT: 201.8 LBS

## 2020-03-04 DIAGNOSIS — R91.1 LUNG NODULE, SOLITARY: ICD-10-CM

## 2020-03-04 DIAGNOSIS — E03.9 ACQUIRED HYPOTHYROIDISM: Primary | ICD-10-CM

## 2020-03-04 DIAGNOSIS — I10 ESSENTIAL HYPERTENSION: ICD-10-CM

## 2020-03-04 DIAGNOSIS — E11.9 TYPE 2 DIABETES MELLITUS WITHOUT COMPLICATION, WITHOUT LONG-TERM CURRENT USE OF INSULIN (HCC): ICD-10-CM

## 2020-03-04 DIAGNOSIS — Z96.652 S/P LEFT UNICOMPARTMENTAL KNEE REPLACEMENT: ICD-10-CM

## 2020-03-04 DIAGNOSIS — K75.81 NASH (NONALCOHOLIC STEATOHEPATITIS): ICD-10-CM

## 2020-03-04 DIAGNOSIS — E78.2 MIXED HYPERLIPIDEMIA: ICD-10-CM

## 2020-03-04 DIAGNOSIS — E55.9 VITAMIN D DEFICIENCY: ICD-10-CM

## 2020-03-04 PROCEDURE — 99213 OFFICE O/P EST LOW 20 MIN: CPT | Performed by: PHYSICIAN ASSISTANT

## 2020-03-04 NOTE — PROGRESS NOTES
Subjective   Chief Complaint   Patient presents with   • Hypertension     follow up   • Hyperlipidemia       History of Present Illness      Pt here today for fup. She is doing well. No new problems. No chest pain, SOA, palpitations. No peripheral neuropathy symptoms.       Patient Active Problem List   Diagnosis   • Abnormal liver function tests   • Asthma   • Mixed anxiety depressive disorder   • Hyperlipidemia   • Hypertension   • Hypothyroidism   • Lung nodule, solitary   • Psoriasis   • Vitamin D deficiency   • Right medial knee pain   • Type 2 diabetes mellitus without complication (CMS/HCC)   • Chronic pain of left knee   • CAMEJO (nonalcoholic steatohepatitis)       Allergies   Allergen Reactions   • Morphine And Related Hives   • Penicillins Hives   • Morphine Hives       Current Outpatient Medications on File Prior to Visit   Medication Sig Dispense Refill   • ACCU-CHEK KARMA PLUS test strip USE AS DIRECTED TO TEST BLOOD SUGAR DAILY 100 each 1   • ACCU-CHEK SOFTCLIX LANCETS lancets USE AS DIRECTED TO TEST BLOOD SUGAR DAILY 100 each 1   • atorvastatin (LIPITOR) 40 MG tablet TAKE 1 TABLET DAILY AS DIRECTED 90 tablet 2   • clobetasol (TEMOVATE) 0.05 % external solution Apply 1 application topically to the appropriate area as directed As Needed (for psoriasis). 50 mL 2   • fenofibrate (TRICOR) 145 MG tablet Take 1 tablet by mouth Daily. 90 tablet 3   • fluocinolone (SYNALAR) 0.01 % external solution Apply  topically 2 (Two) Times a Day. (Patient taking differently: Apply 1 application topically As Needed.) 60 mL 2   • FLUoxetine (PROzac) 10 MG capsule TAKE 1 CAPSULE DAILY 90 capsule 3   • fluticasone (FLONASE) 50 MCG/ACT nasal spray 2 sprays into the nostril(s) as directed by provider Daily. 1 bottle 0   • JARDIANCE 25 MG tablet TAKE 1 TABLET BY MOUTH ONCE DAILY 30 tablet 11   • levothyroxine (SYNTHROID, LEVOTHROID) 88 MCG tablet TAKE 1 TABLET DAILY 90 tablet 1   • lisinopril (PRINIVIL,ZESTRIL) 5 MG tablet Take  1 tablet by mouth Daily. 90 tablet 3   • meloxicam (MOBIC) 15 MG tablet TAKE ONE TABLET BY MOUTH DAILY WITH FOOD 30 tablet 0   • metFORMIN (GLUCOPHAGE) 1000 MG tablet TAKE 1 TABLET TWICE A  tablet 1   • metoprolol succinate XL (TOPROL-XL) 100 MG 24 hr tablet Take 1 tablet by mouth Every Morning. 90 tablet 3   • albuterol sulfate  (90 Base) MCG/ACT inhaler Inhale 2 puffs Every 4 (Four) Hours As Needed for Wheezing. 1 inhaler 0   • [DISCONTINUED] azithromycin (ZITHROMAX) 250 MG tablet Take 2 tablets the first day, then 1 tablet daily for 4 days. 6 tablet 0   • [DISCONTINUED] clindamycin (CLEOCIN) 300 MG capsule Take both caps 1 hour prior to procedure 2 capsule 2   • [DISCONTINUED] meloxicam (MOBIC) 15 MG tablet Take 1 tablet by mouth Daily. 30 tablet 0   • [DISCONTINUED] predniSONE (DELTASONE) 20 MG tablet Take 1 tablet by mouth Daily for 5 days. 5 tablet 0     No current facility-administered medications on file prior to visit.        Past Medical History:   Diagnosis Date   • Allergic rhinitis    • Arthritis    • Asthma    • Endometriosis    • Fractures    • Hyperlipidemia    • Hypertension     ON TOPROL FOR MVP   • Limited joint range of motion     LT KNEE   • Mitral valve prolapse    • CAMEJO (nonalcoholic steatohepatitis)    • Prediabetes    • Psoriasis    • Renal insufficiency    • Sleep apnea     USES C-PAP   • Tachycardia    • Vitamin D deficiency        Family History   Problem Relation Age of Onset   • Vaginal cancer Mother    • Lung cancer Mother    • Arthritis Mother    • Diabetes Mother            • Cancer Mother         Lung   • Coronary artery disease Father    • Arthritis Father    • Kidney cancer Father    • Cancer Father         Kidney   • Coronary artery disease Brother    • Asthma Sister    • Osteoporosis Sister    • Osteoporosis Maternal Grandmother            • Osteoporosis Sister         Being treated   • Malig Hyperthermia Neg Hx        Social History      Socioeconomic History   • Marital status:      Spouse name: Not on file   • Number of children: Not on file   • Years of education: Not on file   • Highest education level: Not on file   Tobacco Use   • Smoking status: Never Smoker   • Smokeless tobacco: Never Used   • Tobacco comment: lots of second hand smoke during childhood   Substance and Sexual Activity   • Alcohol use: Yes     Frequency: Monthly or less     Comment: maybe have 1 drink every couple of months   • Drug use: No   • Sexual activity: Not Currently     Partners: Male     Birth control/protection: Post-menopausal       Past Surgical History:   Procedure Laterality Date   • BACK SURGERY  1982   • CATARACT EXTRACTION, BILATERAL Bilateral 2015   • CHOLECYSTECTOMY  2000'S   • HYSTERECTOMY  1982   • KNEE ARTHROPLASTY UNICOMPARTMENTAL Left 6/1/2018    Procedure: KNEE ARTHROPLASTY UNICOMPARTMENTAL;  Surgeon: Emanuel Jack MD;  Location: [facility];  Service: Orthopedics   • LUMBAR DISCECTOMY  1982   • TIBIA FASCIOTOMY Left 2006   • TUBAL ABDOMINAL LIGATION Bilateral 1981         The following portions of the patient's history were reviewed and updated as appropriate: problem list, allergies, current medications, past medical history, past family history, past social history and past surgical history.    Review of Systems   Cardiovascular: Negative for chest pain and dyspnea on exertion.   Respiratory: Negative for cough and shortness of breath.    Musculoskeletal:        Right knee pain   Neurological: Negative for numbness and paresthesias.       Immunization History   Administered Date(s) Administered   • Flu Mist 10/02/2013, 10/01/2014, 10/07/2015   • Flu Vaccine Quad PF >18YRS 09/22/2016, 12/12/2017   • Hepatitis A 05/12/2018, 12/04/2018   • Influenza, Unspecified 12/04/2018   • Pneumococcal Polysaccharide (PPSV23) 04/17/2014   • Td 10/02/2005   • Tdap 04/17/2014, 09/15/2019   • Zostavax 08/24/2012       Objective   Vitals:    03/04/20  "0904 03/04/20 0923   BP:  122/70   Weight: 91.5 kg (201 lb 12.8 oz)    Height: 172.7 cm (68\")      Body mass index is 30.68 kg/m².  Physical Exam   Constitutional: She appears well-developed and well-nourished.   HENT:   Head: Normocephalic and atraumatic.   Neck: Neck supple. Carotid bruit is not present. No thyromegaly present.   Cardiovascular: Normal rate and regular rhythm.   No edema   Pulmonary/Chest: Effort normal and breath sounds normal.    Namrata had a diabetic foot exam performed today.   During the foot exam she had a monofilament test performed.  Vascular Status -  Her right foot exhibits normal foot vasculature  and no edema. Her left foot exhibits normal foot vasculature  and no edema.  Vitals reviewed.      Assessment/Plan   Namrata was seen today for hypertension and hyperlipidemia.    Diagnoses and all orders for this visit:    Acquired hypothyroidism    Essential hypertension    Lung nodule, solitary    CAMEJO (nonalcoholic steatohepatitis)    Type 2 diabetes mellitus without complication, without long-term current use of insulin (CMS/[facility])    Vitamin D deficiency    1. HTN: BP well controlled, no changes in meds.     2. DM: Labs today.     3. Lung nodule: Followed by Dr. Alegria.     4. Vitamin D deficiency: Lab today.     5. Hypothyroidism: TSH today.     Return in about 6 months (around 9/4/2020) for Lab Today, Lab Appt Before FUP.             "

## 2020-03-05 LAB
ALBUMIN SERPL-MCNC: 4.5 G/DL (ref 3.5–5.2)
ALBUMIN/CREAT UR: 4 MG/G CREAT (ref 0–29)
ALBUMIN/GLOB SERPL: 1.7 G/DL
ALP SERPL-CCNC: 78 U/L (ref 39–117)
ALT SERPL-CCNC: 40 U/L (ref 1–33)
AST SERPL-CCNC: 46 U/L (ref 1–32)
BILIRUB SERPL-MCNC: 0.5 MG/DL (ref 0.2–1.2)
BUN SERPL-MCNC: 17 MG/DL (ref 8–23)
BUN/CREAT SERPL: 18.7 (ref 7–25)
CALCIUM SERPL-MCNC: 9.6 MG/DL (ref 8.6–10.5)
CHLORIDE SERPL-SCNC: 97 MMOL/L (ref 98–107)
CHOLEST SERPL-MCNC: 154 MG/DL (ref 0–200)
CHOLEST/HDLC SERPL: 3.42 {RATIO}
CO2 SERPL-SCNC: 26.3 MMOL/L (ref 22–29)
CREAT SERPL-MCNC: 0.91 MG/DL (ref 0.57–1)
CREAT UR-MCNC: 105.2 MG/DL
GLOBULIN SER CALC-MCNC: 2.6 GM/DL
GLUCOSE SERPL-MCNC: 96 MG/DL (ref 65–99)
HBA1C MFR BLD: 6.7 % (ref 4.8–5.6)
HDLC SERPL-MCNC: 45 MG/DL (ref 40–60)
LDLC SERPL CALC-MCNC: 82 MG/DL (ref 0–100)
MICROALBUMIN UR-MCNC: 3.9 UG/ML
POTASSIUM SERPL-SCNC: 4.7 MMOL/L (ref 3.5–5.2)
PROT SERPL-MCNC: 7.1 G/DL (ref 6–8.5)
SODIUM SERPL-SCNC: 136 MMOL/L (ref 136–145)
TRIGL SERPL-MCNC: 133 MG/DL (ref 0–150)
TSH SERPL DL<=0.005 MIU/L-ACNC: 1.21 UIU/ML (ref 0.27–4.2)
VLDLC SERPL CALC-MCNC: 26.6 MG/DL

## 2020-03-05 RX ORDER — MELOXICAM 15 MG/1
TABLET ORAL
Qty: 30 TABLET | Refills: 0 | Status: SHIPPED | OUTPATIENT
Start: 2020-03-05 | End: 2020-04-17 | Stop reason: SDUPTHER

## 2020-03-31 ENCOUNTER — TELEPHONE (OUTPATIENT)
Dept: ORTHOPEDIC SURGERY | Facility: CLINIC | Age: 65
End: 2020-03-31

## 2020-03-31 NOTE — TELEPHONE ENCOUNTER
----- Message from Namrata Luz sent at 3/31/2020  3:22 PM EDT -----  Regarding: Non-Urgent Medical Question  Contact: [phone]  I understand my appointment is cancelled on April 10. I will not reschedule and keep scheduled appointment in June    Thank you  Namrata Luz

## 2020-04-17 DIAGNOSIS — Z96.652 S/P LEFT UNICOMPARTMENTAL KNEE REPLACEMENT: ICD-10-CM

## 2020-04-17 RX ORDER — MELOXICAM 15 MG/1
TABLET ORAL
Qty: 30 TABLET | Refills: 0 | Status: SHIPPED | OUTPATIENT
Start: 2020-04-17 | End: 2020-06-16 | Stop reason: SDUPTHER

## 2020-04-17 NOTE — TELEPHONE ENCOUNTER
----- Message from Namrata Luz sent at 2020 11:51 AM EDT -----  Regarding: Prescription Question  Contact: [phone]  MY CHART MESSAGE:    Please refill the Meloxicam 15 mg tablet    I have follow up appt on . See you then.     Thank you  Namrata Luz    1955  [phone]

## 2020-04-27 DIAGNOSIS — E11.9 TYPE 2 DIABETES MELLITUS WITHOUT COMPLICATION, WITHOUT LONG-TERM CURRENT USE OF INSULIN (HCC): Primary | ICD-10-CM

## 2020-05-26 DIAGNOSIS — E11.9 TYPE 2 DIABETES MELLITUS WITHOUT COMPLICATION, WITHOUT LONG-TERM CURRENT USE OF INSULIN (HCC): ICD-10-CM

## 2020-05-26 DIAGNOSIS — F41.8 MIXED ANXIETY DEPRESSIVE DISORDER: Primary | ICD-10-CM

## 2020-05-26 DIAGNOSIS — E03.9 ACQUIRED HYPOTHYROIDISM: Primary | ICD-10-CM

## 2020-05-26 RX ORDER — FLUOXETINE 10 MG/1
10 CAPSULE ORAL DAILY
Qty: 90 CAPSULE | Refills: 0 | Status: SHIPPED | OUTPATIENT
Start: 2020-05-26 | End: 2020-08-24

## 2020-05-26 RX ORDER — LEVOTHYROXINE SODIUM 88 UG/1
88 TABLET ORAL DAILY
Qty: 90 TABLET | Refills: 1 | Status: SHIPPED | OUTPATIENT
Start: 2020-05-26 | End: 2020-10-26

## 2020-06-11 ENCOUNTER — OFFICE VISIT (OUTPATIENT)
Dept: ORTHOPEDIC SURGERY | Facility: CLINIC | Age: 65
End: 2020-06-11

## 2020-06-11 VITALS — BODY MASS INDEX: 31.22 KG/M2 | HEIGHT: 68 IN | TEMPERATURE: 96.9 F | WEIGHT: 206 LBS

## 2020-06-11 DIAGNOSIS — M17.11 PRIMARY OSTEOARTHRITIS OF RIGHT KNEE: ICD-10-CM

## 2020-06-11 DIAGNOSIS — M25.561 CHRONIC PAIN OF RIGHT KNEE: Primary | ICD-10-CM

## 2020-06-11 DIAGNOSIS — G89.29 CHRONIC PAIN OF RIGHT KNEE: Primary | ICD-10-CM

## 2020-06-11 PROCEDURE — 99214 OFFICE O/P EST MOD 30 MIN: CPT | Performed by: ORTHOPAEDIC SURGERY

## 2020-06-11 PROCEDURE — 73562 X-RAY EXAM OF KNEE 3: CPT | Performed by: ORTHOPAEDIC SURGERY

## 2020-06-11 RX ORDER — PREGABALIN 75 MG/1
150 CAPSULE ORAL ONCE
Status: CANCELLED | OUTPATIENT
Start: 2020-12-18 | End: 2020-06-11

## 2020-06-11 RX ORDER — MELOXICAM 15 MG/1
15 TABLET ORAL ONCE
Status: CANCELLED | OUTPATIENT
Start: 2020-12-18 | End: 2020-06-11

## 2020-06-11 RX ORDER — CEFAZOLIN SODIUM 2 G/100ML
2 INJECTION, SOLUTION INTRAVENOUS ONCE
Status: CANCELLED | OUTPATIENT
Start: 2020-12-18 | End: 2020-06-11

## 2020-06-11 NOTE — PROGRESS NOTES
Patient: Namrata Luz  YOB: 1955 64 y.o. female  Medical Record Number: [id number]     Chief Complaints:       Chief Complaint   Patient presents with   • Right Knee - Follow-up, Pain         History of Present Illness:Namrata Luz is a 64 y.o. female who presents for follow-up of  Right knee she has worsening medial knee pain at times is severe.  Pain now limits activities.  Worse with weightbearing activity.  It has worsened over the past 6 months. Innjection helped for a couple months but pain is now worse. Pain limits ADLs.     Allergies:        Allergies   Allergen Reactions   • Morphine And Related Hives   • Penicillins Hives   • Morphine Hives         Medications:          Current Outpatient Medications   Medication Sig Dispense Refill   • albuterol sulfate  (90 Base) MCG/ACT inhaler Inhale 2 puffs Every 4 (Four) Hours As Needed for Wheezing. 1 inhaler 0   • atorvastatin (LIPITOR) 40 MG tablet TAKE 1 TABLET DAILY AS DIRECTED 90 tablet 2   • clobetasol (TEMOVATE) 0.05 % external solution Apply 1 application topically to the appropriate area as directed As Needed (for psoriasis). 50 mL 2   • fenofibrate (TRICOR) 145 MG tablet Take 1 tablet by mouth Daily. 90 tablet 3   • fluocinolone (SYNALAR) 0.01 % external solution Apply  topically 2 (Two) Times a Day. (Patient taking differently: Apply 1 application topically As Needed.) 60 mL 2   • FLUoxetine (PROzac) 10 MG capsule TAKE 1 CAPSULE DAILY 90 capsule 3   • fluticasone (FLONASE) 50 MCG/ACT nasal spray 2 sprays into the nostril(s) as directed by provider Daily. 1 bottle 0   • JARDIANCE 25 MG tablet TAKE 1 TABLET BY MOUTH ONCE DAILY 30 tablet 11   • levothyroxine (SYNTHROID, LEVOTHROID) 88 MCG tablet TAKE 1 TABLET DAILY 90 tablet 1   • lisinopril (PRINIVIL,ZESTRIL) 5 MG tablet Take 1 tablet by mouth Daily. 90 tablet 3   • meloxicam (MOBIC) 15 MG tablet TAKE ONE TABLET BY MOUTH DAILY WITH FOOD 30 tablet 0   • metFORMIN (GLUCOPHAGE) 1000  "MG tablet TAKE 1 TABLET TWICE A  tablet 1   • metoprolol succinate XL (TOPROL-XL) 100 MG 24 hr tablet Take 100 mg by mouth Every Morning.       • ACCU-CHEK KARMA PLUS test strip USE AS DIRECTED TO TEST BLOOD SUGAR DAILY 100 each 1   • ACCU-CHEK SOFTCLIX LANCETS lancets USE AS DIRECTED TO TEST BLOOD SUGAR DAILY 100 each 1      No current facility-administered medications for this visit.             The following portions of the patient's history were reviewed and updated as appropriate: allergies, current medications, past family history, past medical history, past social history, past surgical history and problem list.     Review of Systems:   A 14 point review of systems was performed. All systems negative except pertinent positives/negative listed in HPI above     Physical Exam:       Vitals:     12/05/19 1635   Weight: 87.5 kg (193 lb)   Height: 172.7 cm (68\")         General: A and O x 3, ASA, NAD                          SCLERA:    Normal                          DENTITION:   Normal  Knee:  right                          ALIGNMENT:     Varus  ,   Patella  tracks  midline                          GAIT:    Antalgic                          SKIN:    No abnormality                          RANGE OF MOTION:   0  -  125   DEG                          STRENGTH:   4  / 5                          LIGAMENTS:    No varus / valgus instability.   Negative  Lachman.                          MENISCUS:     Negative   Noreen                             DISTAL PULSES:    Paplable                          DISTAL SENSATION :   Intact                          LYMPHATICS:     No   lymphadenopathy                          OTHER:          - Positive   effusion                                                  - Crepitance with ROM                   Radiology:  Xrays 3views right knee  (ap,lateral, sunrise) recently taken at outside institution were reviewed demonstratingadvanced varus osteoarthritis with bone on bone " articulation, subchondral cysts, and periarticular osteophytes - medial jonit space bone on bone (isolated to medial compartment)  todays xrays were compared to previous xrays and show new findings as described above - progression     Assessment/Plan:  Right knee OA.  Continuation of conservative management vs. TKA discussed.  The patient wishes to proceed with total knee replacement.  At this point the patient has failed the full compliment of conservative treatment and stating complete understanding of the risks/benefits/ anternatives wishes to proceed with surgical treatment.    Risk and benefits of surgery were reviewed.  Including, but not limited to, blood clots or pulmonary embolism, anesthesia risk, infection, fracture, skin/leg numbness, persistent pain/crepitance/popping/catching, failure of the implant, need for future surgeries, hematoma, possible nerve or blood vessel injury, need for transfusion, and potential risk of stroke,heart attack or death, among others.  The patient understands and wishes to proceed.     It was explained that if tissue has been repaired or reconstructed, there is also an increased chance of failure which may require further management.  Following the completion of the discussion, the patient expressed understanding of this planned course of care, all their questions were answered and consent will be obtained preoperatively.    Operative Plan: [facility] Total K[facility] Replacement an overnight staywith home health rehab    [facility] 6/11/2020

## 2020-06-16 DIAGNOSIS — Z96.652 S/P LEFT UNICOMPARTMENTAL KNEE REPLACEMENT: ICD-10-CM

## 2020-06-16 RX ORDER — MELOXICAM 15 MG/1
TABLET ORAL
Qty: 30 TABLET | Refills: 0 | OUTPATIENT
Start: 2020-06-16 | End: 2020-11-09

## 2020-06-19 ENCOUNTER — OFFICE VISIT (OUTPATIENT)
Dept: ORTHOPEDIC SURGERY | Facility: CLINIC | Age: 65
End: 2020-06-19

## 2020-06-19 VITALS — TEMPERATURE: 98.3 F | BODY MASS INDEX: 31.22 KG/M2 | WEIGHT: 206 LBS | HEIGHT: 68 IN

## 2020-06-19 DIAGNOSIS — M25.561 CHRONIC PAIN OF RIGHT KNEE: Primary | ICD-10-CM

## 2020-06-19 DIAGNOSIS — G89.29 CHRONIC PAIN OF RIGHT KNEE: Primary | ICD-10-CM

## 2020-06-19 PROCEDURE — 99212 OFFICE O/P EST SF 10 MIN: CPT | Performed by: NURSE PRACTITIONER

## 2020-06-19 PROCEDURE — 20610 DRAIN/INJ JOINT/BURSA W/O US: CPT | Performed by: NURSE PRACTITIONER

## 2020-06-19 RX ORDER — LIDOCAINE HYDROCHLORIDE 20 MG/ML
2 INJECTION, SOLUTION EPIDURAL; INFILTRATION; INTRACAUDAL; PERINEURAL
Status: COMPLETED | OUTPATIENT
Start: 2020-06-19 | End: 2020-06-19

## 2020-06-19 RX ADMIN — LIDOCAINE HYDROCHLORIDE 2 ML: 20 INJECTION, SOLUTION EPIDURAL; INFILTRATION; INTRACAUDAL; PERINEURAL at 09:19

## 2020-08-04 ENCOUNTER — RESULTS ENCOUNTER (OUTPATIENT)
Dept: INTERNAL MEDICINE | Facility: CLINIC | Age: 65
End: 2020-08-04

## 2020-08-04 DIAGNOSIS — E03.9 ACQUIRED HYPOTHYROIDISM: ICD-10-CM

## 2020-08-04 DIAGNOSIS — E11.9 TYPE 2 DIABETES MELLITUS WITHOUT COMPLICATION, WITHOUT LONG-TERM CURRENT USE OF INSULIN (HCC): ICD-10-CM

## 2020-08-04 DIAGNOSIS — E78.2 MIXED HYPERLIPIDEMIA: ICD-10-CM

## 2020-08-18 ENCOUNTER — TELEPHONE (OUTPATIENT)
Dept: ORTHOPEDIC SURGERY | Facility: CLINIC | Age: 65
End: 2020-08-18

## 2020-08-18 NOTE — TELEPHONE ENCOUNTER
Call returned to the patient.  I was unable to reach her but I did leave a message that she is now more than 2 years post total joint replacement and no longer requires premedication per [facility]

## 2020-08-18 NOTE — TELEPHONE ENCOUNTER
----- Message from Namrata Luz sent at 8/18/2020  2:38 PM EDT -----  Regarding: Prescription Question  Contact: [phone]  MY CHART MESSAGE:    I have dental appointment on August 24. Do i still need to take preventive antibiotics before procedure.     Thank you  Namrata Luz   [phone]

## 2020-08-21 DIAGNOSIS — F41.8 MIXED ANXIETY DEPRESSIVE DISORDER: ICD-10-CM

## 2020-08-24 DIAGNOSIS — E11.9 TYPE 2 DIABETES MELLITUS WITHOUT COMPLICATION, WITHOUT LONG-TERM CURRENT USE OF INSULIN (HCC): ICD-10-CM

## 2020-08-24 DIAGNOSIS — F41.8 MIXED ANXIETY DEPRESSIVE DISORDER: ICD-10-CM

## 2020-08-24 RX ORDER — FLUOXETINE 10 MG/1
CAPSULE ORAL
Qty: 90 CAPSULE | Refills: 0 | Status: SHIPPED | OUTPATIENT
Start: 2020-08-24 | End: 2020-10-26

## 2020-08-24 RX ORDER — FENOFIBRATE 145 MG/1
145 TABLET, COATED ORAL DAILY
Qty: 90 TABLET | Refills: 3 | Status: SHIPPED | OUTPATIENT
Start: 2020-08-24 | End: 2021-05-17

## 2020-09-03 DIAGNOSIS — I10 ESSENTIAL HYPERTENSION: Primary | ICD-10-CM

## 2020-09-03 DIAGNOSIS — E11.9 TYPE 2 DIABETES MELLITUS WITHOUT COMPLICATION, WITHOUT LONG-TERM CURRENT USE OF INSULIN (HCC): ICD-10-CM

## 2020-09-03 RX ORDER — LISINOPRIL 5 MG/1
5 TABLET ORAL DAILY
Qty: 90 TABLET | Refills: 1 | Status: SHIPPED | OUTPATIENT
Start: 2020-09-03 | End: 2021-01-19

## 2020-09-28 DIAGNOSIS — E11.9 TYPE 2 DIABETES MELLITUS WITHOUT COMPLICATION, WITHOUT LONG-TERM CURRENT USE OF INSULIN (HCC): ICD-10-CM

## 2020-09-29 RX ORDER — EMPAGLIFLOZIN 25 MG/1
TABLET, FILM COATED ORAL
Qty: 30 TABLET | Refills: 0 | OUTPATIENT
Start: 2020-09-29

## 2020-10-02 ENCOUNTER — CLINICAL SUPPORT (OUTPATIENT)
Dept: ORTHOPEDIC SURGERY | Facility: CLINIC | Age: 65
End: 2020-10-02

## 2020-10-02 VITALS — HEIGHT: 68 IN | WEIGHT: 205.91 LBS | TEMPERATURE: 96.6 F | BODY MASS INDEX: 31.21 KG/M2

## 2020-10-02 DIAGNOSIS — E11.9 TYPE 2 DIABETES MELLITUS WITHOUT COMPLICATION, WITHOUT LONG-TERM CURRENT USE OF INSULIN (HCC): ICD-10-CM

## 2020-10-02 DIAGNOSIS — E03.9 ACQUIRED HYPOTHYROIDISM: ICD-10-CM

## 2020-10-02 DIAGNOSIS — E78.2 MIXED HYPERLIPIDEMIA: Primary | ICD-10-CM

## 2020-10-02 DIAGNOSIS — M17.11 PRIMARY OSTEOARTHRITIS OF RIGHT KNEE: Primary | ICD-10-CM

## 2020-10-02 DIAGNOSIS — E55.9 VITAMIN D DEFICIENCY: ICD-10-CM

## 2020-10-02 PROCEDURE — 20610 DRAIN/INJ JOINT/BURSA W/O US: CPT | Performed by: NURSE PRACTITIONER

## 2020-10-02 PROCEDURE — 99214 OFFICE O/P EST MOD 30 MIN: CPT | Performed by: NURSE PRACTITIONER

## 2020-10-02 RX ORDER — MELOXICAM 7.5 MG/1
15 TABLET ORAL ONCE
Status: CANCELLED | OUTPATIENT
Start: 2020-10-02 | End: 2020-10-02

## 2020-10-02 RX ORDER — PREGABALIN 150 MG/1
150 CAPSULE ORAL ONCE
Status: CANCELLED | OUTPATIENT
Start: 2020-10-02 | End: 2020-10-02

## 2020-10-02 RX ORDER — METHYLPREDNISOLONE ACETATE 80 MG/ML
80 INJECTION, SUSPENSION INTRA-ARTICULAR; INTRALESIONAL; INTRAMUSCULAR; SOFT TISSUE
Status: COMPLETED | OUTPATIENT
Start: 2020-10-02 | End: 2020-10-02

## 2020-10-02 RX ORDER — EMPAGLIFLOZIN 25 MG/1
1 TABLET, FILM COATED ORAL DAILY
Qty: 30 TABLET | Refills: 0 | Status: SHIPPED | OUTPATIENT
Start: 2020-10-02 | End: 2020-10-26

## 2020-10-02 RX ADMIN — METHYLPREDNISOLONE ACETATE 80 MG: 80 INJECTION, SUSPENSION INTRA-ARTICULAR; INTRALESIONAL; INTRAMUSCULAR; SOFT TISSUE at 08:38

## 2020-10-02 NOTE — ADDENDUM NOTE
Addended by: RHEA WARD on: 2/26/2018 08:37 AM     Modules accepted: Orders     Alert and interactive, no focal deficits

## 2020-10-02 NOTE — PROGRESS NOTES
Patient: Namrata Luz  YOB: 1955 64 y.o. female  Medical Record Number: [id number]    Chief Complaints: Right Knee Pain    History of Present Illness:Namrata Luz is a 64 y.o. female who presents with complaints of increased right knee pain and swelling.  She denies any injury.  Describes the knee pain as a severe sharp stabbing type pain which is constant worse with standing walking, better with rest.    Allergies:   Allergies   Allergen Reactions   • Morphine And Related Hives   • Penicillins Hives   • Morphine Hives       Medications:   Current Outpatient Medications   Medication Sig Dispense Refill   • ACCU-CHEK KARMA PLUS test strip USE AS DIRECTED TO TEST BLOOD SUGAR DAILY 100 each 1   • ACCU-CHEK SOFTCLIX LANCETS lancets USE AS DIRECTED TO TEST BLOOD SUGAR DAILY 100 each 1   • albuterol sulfate  (90 Base) MCG/ACT inhaler Inhale 2 puffs Every 4 (Four) Hours As Needed for Wheezing. 1 inhaler 0   • atorvastatin (LIPITOR) 40 MG tablet TAKE 1 TABLET DAILY AS DIRECTED 90 tablet 2   • clobetasol (TEMOVATE) 0.05 % external solution Apply 1 application topically to the appropriate area as directed As Needed (for psoriasis). 50 mL 2   • Empagliflozin (Jardiance) 25 MG tablet Take 25 mg by mouth Daily. 30 tablet 1   • fenofibrate (TRICOR) 145 MG tablet Take 1 tablet by mouth Daily. 90 tablet 3   • fluocinolone (SYNALAR) 0.01 % external solution Apply  topically 2 (Two) Times a Day. (Patient taking differently: Apply 1 application topically As Needed.) 60 mL 2   • FLUoxetine (PROzac) 10 MG capsule Take 1 capsule by mouth once daily 90 capsule 0   • fluticasone (FLONASE) 50 MCG/ACT nasal spray 2 sprays into the nostril(s) as directed by provider Daily. 1 bottle 0   • levothyroxine (SYNTHROID, LEVOTHROID) 88 MCG tablet Take 1 tablet by mouth Daily. 90 tablet 1   • lisinopril (PRINIVIL,ZESTRIL) 5 MG tablet Take 1 tablet by mouth Daily. 90 tablet 1   • meloxicam (MOBIC) 15 MG tablet TAKE ONE TABLET  BY MOUTH DAILY 30 tablet 0   • metFORMIN (GLUCOPHAGE) 1000 MG tablet Take 1 tablet by mouth 2 (Two) Times a Day. 180 tablet 1   • metoprolol succinate XL (TOPROL-XL) 100 MG 24 hr tablet Take 1 tablet by mouth Every Morning. 90 tablet 3     No current facility-administered medications for this visit.          The following portions of the patient's history were reviewed and updated as appropriate: allergies, current medications, past family history, past medical history, past social history, past surgical history and problem list.    Review of Systems:   A 14 point review of systems was performed. All systems negative except pertinent positives/negative listed in HPI above    Physical Exam:   There were no vitals filed for this visit.    General: A and O x 3, ASA, NAD    SCLERA:    Normal    DENTITION:   Normal  Skin clear no unusual lesions noted  Right knee patient does have 1+ effusion noted with limited range of motion secondary to pain calf is soft and nontender    Radiology:  Xrays previous x-rays of the right knee show bone-on-bone end-stage osteoarthritis.    Assessment/Plan:  End-stage osteoarthritis right knee    Patient discussed treatment options, she would like to proceed with right knee replacement and temporarily try aspiration and injection.  She will continue with physical therapy exercises we will see her back prior to surgery    Continuation of conservative management vs. TKA discussed.  The patient wishes to proceed with total knee replacement.  At this point the patient has failed the full compliment of conservative treatment and stating complete understanding of the risks/benefits/ anternatives wishes to proceed with surgical treatment.    Risk and benefits of surgery were reviewed.  Including, but not limited to, blood clots or pulmonary embolism, anesthesia risk, infection, fracture, skin/leg numbness, persistent pain/crepitance/popping/catching, failure of the implant, need for future  surgeries, hematoma, possible nerve or blood vessel injury, need for transfusion, and potential risk of stroke,heart attack or death, among others.  The patient understands and wishes to proceed.     It was explained that if tissue has been repaired or reconstructed, there is also an increased chance of failure which may require further management.  Following the completion of the discussion, the patient expressed understanding of this planned course of care, all their questions were answered and consent will be obtained preoperatively.    Operative Plan: [facility] Total Knee Replacement performing the procedure on an outpatient basiswith home health rehab    Large Joint Arthrocentesis: R knee  Date/Time: 10/2/2020 8:38 AM  Consent given by: patient  Site marked: site marked  Timeout: Immediately prior to procedure a time out was called to verify the correct patient, procedure, equipment, support staff and site/side marked as required   Supporting Documentation  Indications: pain   Procedure Details  Location: knee - R knee  Preparation: Patient was prepped and draped in the usual sterile fashion  Needle gauge: 21g.  Approach: lateral  Medications administered: 4 mL lidocaine (cardiac); 80 mg methylPREDNISolone acetate 80 MG/ML  Aspirate amount: 60 mL  Aspirate: clear  Patient tolerance: patient tolerated the procedure well with no immediate complications

## 2020-10-06 PROBLEM — M17.11 PRIMARY OSTEOARTHRITIS OF RIGHT KNEE: Status: ACTIVE | Noted: 2020-10-06

## 2020-10-20 DIAGNOSIS — E03.9 ACQUIRED HYPOTHYROIDISM: ICD-10-CM

## 2020-10-20 DIAGNOSIS — E55.9 VITAMIN D DEFICIENCY: ICD-10-CM

## 2020-10-20 DIAGNOSIS — I10 ESSENTIAL HYPERTENSION: ICD-10-CM

## 2020-10-20 DIAGNOSIS — E78.2 MIXED HYPERLIPIDEMIA: Primary | ICD-10-CM

## 2020-10-20 DIAGNOSIS — E11.9 TYPE 2 DIABETES MELLITUS WITHOUT COMPLICATION, WITHOUT LONG-TERM CURRENT USE OF INSULIN (HCC): ICD-10-CM

## 2020-10-21 ENCOUNTER — OFFICE VISIT (OUTPATIENT)
Dept: INTERNAL MEDICINE | Facility: CLINIC | Age: 65
End: 2020-10-21

## 2020-10-21 ENCOUNTER — TELEPHONE (OUTPATIENT)
Dept: INTERNAL MEDICINE | Facility: CLINIC | Age: 65
End: 2020-10-21

## 2020-10-21 DIAGNOSIS — E78.2 MIXED HYPERLIPIDEMIA: ICD-10-CM

## 2020-10-21 DIAGNOSIS — K75.81 NASH (NONALCOHOLIC STEATOHEPATITIS): ICD-10-CM

## 2020-10-21 DIAGNOSIS — I10 ESSENTIAL HYPERTENSION: ICD-10-CM

## 2020-10-21 DIAGNOSIS — Z00.00 HEALTHCARE MAINTENANCE: ICD-10-CM

## 2020-10-21 DIAGNOSIS — E11.9 TYPE 2 DIABETES MELLITUS WITHOUT COMPLICATION, WITHOUT LONG-TERM CURRENT USE OF INSULIN (HCC): ICD-10-CM

## 2020-10-21 DIAGNOSIS — E03.9 ACQUIRED HYPOTHYROIDISM: ICD-10-CM

## 2020-10-21 DIAGNOSIS — Z12.31 SCREENING MAMMOGRAM, ENCOUNTER FOR: Primary | ICD-10-CM

## 2020-10-21 DIAGNOSIS — E55.9 VITAMIN D DEFICIENCY: ICD-10-CM

## 2020-10-21 LAB
25(OH)D3+25(OH)D2 SERPL-MCNC: 36.6 NG/ML (ref 30–100)
ALBUMIN SERPL-MCNC: 4.6 G/DL (ref 3.5–5.2)
ALBUMIN/GLOB SERPL: 1.8 G/DL
ALP SERPL-CCNC: 84 U/L (ref 39–117)
ALT SERPL-CCNC: 36 U/L (ref 1–33)
AST SERPL-CCNC: 35 U/L (ref 1–32)
BILIRUB SERPL-MCNC: 0.6 MG/DL (ref 0–1.2)
BUN SERPL-MCNC: 12 MG/DL (ref 8–23)
BUN/CREAT SERPL: 13.8 (ref 7–25)
CALCIUM SERPL-MCNC: 9.4 MG/DL (ref 8.6–10.5)
CHLORIDE SERPL-SCNC: 101 MMOL/L (ref 98–107)
CHOLEST SERPL-MCNC: 145 MG/DL (ref 0–200)
CHOLEST/HDLC SERPL: 3.02 {RATIO}
CO2 SERPL-SCNC: 27.9 MMOL/L (ref 22–29)
CREAT SERPL-MCNC: 0.87 MG/DL (ref 0.57–1)
GLOBULIN SER CALC-MCNC: 2.5 GM/DL
GLUCOSE SERPL-MCNC: 99 MG/DL (ref 65–99)
HBA1C MFR BLD: 7.1 % (ref 4.8–5.6)
HDLC SERPL-MCNC: 48 MG/DL (ref 40–60)
LDLC SERPL CALC-MCNC: 70 MG/DL (ref 0–100)
Lab: NORMAL
POTASSIUM SERPL-SCNC: 4.5 MMOL/L (ref 3.5–5.2)
PROT SERPL-MCNC: 7.1 G/DL (ref 6–8.5)
SODIUM SERPL-SCNC: 139 MMOL/L (ref 136–145)
TRIGL SERPL-MCNC: 157 MG/DL (ref 0–150)
TSH SERPL DL<=0.005 MIU/L-ACNC: 1.7 UIU/ML (ref 0.27–4.2)
VLDLC SERPL CALC-MCNC: 27 MG/DL (ref 5–40)

## 2020-10-21 PROCEDURE — 99396 PREV VISIT EST AGE 40-64: CPT | Performed by: PHYSICIAN ASSISTANT

## 2020-10-21 NOTE — PROGRESS NOTES
Subjective   Chief Complaint   Patient presents with   • Hypertension   • Diabetes   • Hyperlipidemia   • Annual Exam       History of Present Illness     Pt is here today for CPE. She is due for a mammogram. Her cscope is up to date. She has no chest pain or SOA. She states her diet has not been great recently and she is not surprised her sugars are up. She has been doing some exercise but not as much as she normally does. Mood is good.       Patient Active Problem List   Diagnosis   • Abnormal liver function tests   • Asthma   • Mixed anxiety depressive disorder   • Hyperlipidemia   • Hypertension   • Hypothyroidism   • Lung nodule, solitary   • Psoriasis   • Vitamin D deficiency   • Right medial knee pain   • Type 2 diabetes mellitus without complication (CMS/HCC)   • Chronic pain of left knee   • CAMEJO (nonalcoholic steatohepatitis)   • Chronic pain of right knee   • Primary osteoarthritis of right knee       Allergies   Allergen Reactions   • Morphine And Related Hives   • Penicillins Hives   • Morphine Hives       Current Outpatient Medications on File Prior to Visit   Medication Sig Dispense Refill   • ACCU-CHEK KARMA PLUS test strip USE AS DIRECTED TO TEST BLOOD SUGAR DAILY 100 each 1   • ACCU-CHEK SOFTCLIX LANCETS lancets USE AS DIRECTED TO TEST BLOOD SUGAR DAILY 100 each 1   • albuterol sulfate  (90 Base) MCG/ACT inhaler Inhale 2 puffs Every 4 (Four) Hours As Needed for Wheezing. 1 inhaler 0   • atorvastatin (LIPITOR) 40 MG tablet TAKE 1 TABLET DAILY AS DIRECTED 90 tablet 2   • clobetasol (TEMOVATE) 0.05 % external solution Apply 1 application topically to the appropriate area as directed As Needed (for psoriasis). 50 mL 2   • Empagliflozin (Jardiance) 25 MG tablet Take 25 mg by mouth Daily. 30 tablet 0   • fenofibrate (TRICOR) 145 MG tablet Take 1 tablet by mouth Daily. 90 tablet 3   • fluocinolone (SYNALAR) 0.01 % external solution Apply  topically 2 (Two) Times a Day. (Patient taking differently:  Apply 1 application topically As Needed.) 60 mL 2   • FLUoxetine (PROzac) 10 MG capsule Take 1 capsule by mouth once daily 90 capsule 0   • fluticasone (FLONASE) 50 MCG/ACT nasal spray 2 sprays into the nostril(s) as directed by provider Daily. 1 bottle 0   • levothyroxine (SYNTHROID, LEVOTHROID) 88 MCG tablet Take 1 tablet by mouth Daily. 90 tablet 1   • lisinopril (PRINIVIL,ZESTRIL) 5 MG tablet Take 1 tablet by mouth Daily. 90 tablet 1   • meloxicam (MOBIC) 15 MG tablet TAKE ONE TABLET BY MOUTH DAILY 30 tablet 0   • metFORMIN (GLUCOPHAGE) 1000 MG tablet Take 1 tablet by mouth 2 (Two) Times a Day. 180 tablet 1   • metoprolol succinate XL (TOPROL-XL) 100 MG 24 hr tablet Take 1 tablet by mouth Every Morning. 90 tablet 3     No current facility-administered medications on file prior to visit.        Past Medical History:   Diagnosis Date   • Allergic rhinitis    • Arthritis    • Asthma    • Endometriosis    • Fractures    • Hyperlipidemia    • Hypertension     ON TOPROL FOR MVP   • Limited joint range of motion     LT KNEE   • Mitral valve prolapse    • CAMEJO (nonalcoholic steatohepatitis)    • Prediabetes    • Primary osteoarthritis of right knee 10/6/2020   • Psoriasis    • Renal insufficiency    • Sleep apnea     USES C-PAP   • Tachycardia    • Vitamin D deficiency        Family History   Problem Relation Age of Onset   • Vaginal cancer Mother    • Lung cancer Mother    • Arthritis Mother    • Diabetes Mother            • Cancer Mother         Lung   • Coronary artery disease Father    • Arthritis Father    • Kidney cancer Father    • Cancer Father         Kidney   • Coronary artery disease Brother    • Asthma Sister    • Osteoporosis Sister    • Osteoporosis Maternal Grandmother            • Osteoporosis Sister         Being treated   • Malig Hyperthermia Neg Hx        Social History     Socioeconomic History   • Marital status:      Spouse name: Not on file   • Number of children: Not  on file   • Years of education: Not on file   • Highest education level: Not on file   Tobacco Use   • Smoking status: Never Smoker   • Smokeless tobacco: Never Used   • Tobacco comment: lots of second hand smoke during childhood   Substance and Sexual Activity   • Alcohol use: Yes     Frequency: Monthly or less     Comment: maybe have 1 drink every couple of months   • Drug use: No   • Sexual activity: Not Currently     Partners: Male     Birth control/protection: Post-menopausal       Past Surgical History:   Procedure Laterality Date   • BACK SURGERY  1982   • CATARACT EXTRACTION, BILATERAL Bilateral 2015   • CHOLECYSTECTOMY  2000'S   • HYSTERECTOMY  1982   • KNEE ARTHROPLASTY UNICOMPARTMENTAL Left 6/1/2018    Procedure: KNEE ARTHROPLASTY UNICOMPARTMENTAL;  Surgeon: Emanuel Jack MD;  Location: [facility];  Service: Orthopedics   • LUMBAR DISCECTOMY  1982   • TIBIA FASCIOTOMY Left 2006   • TUBAL ABDOMINAL LIGATION Bilateral 1981         The following portions of the patient's history were reviewed and updated as appropriate: problem list, allergies, current medications, past medical history, past family history, past social history and past surgical history.    Review of Systems   Constitution: Negative for chills, decreased appetite, fever, weight gain and weight loss.   HENT: Negative for congestion, ear pain, hearing loss, hoarse voice and sore throat.    Eyes: Negative for blurred vision and double vision.   Cardiovascular: Negative for chest pain, dyspnea on exertion, irregular heartbeat, leg swelling and palpitations.   Respiratory: Negative for cough, shortness of breath, snoring and wheezing.    Endocrine: Negative for polydipsia and polyuria.   Skin: Negative for rash and suspicious lesions.   Musculoskeletal: Negative for joint pain, joint swelling, muscle cramps and stiffness.   Gastrointestinal: Negative for abdominal pain, change in bowel habit, constipation, diarrhea, heartburn, hematochezia,  "melena, nausea and vomiting.   Genitourinary: Negative for bladder incontinence, non-menstrual bleeding and pelvic pain.   Neurological: Negative for dizziness, headaches, light-headedness, numbness, paresthesias and tremors.   Psychiatric/Behavioral: Negative for depression, memory loss and suicidal ideas. The patient does not have insomnia and is not nervous/anxious.        Immunization History   Administered Date(s) Administered   • Flu Mist 10/02/2013, 10/01/2014, 10/07/2015   • Flu Vaccine Quad PF >18YRS 09/22/2016, 12/12/2017   • Hepatitis A 05/12/2018, 12/04/2018   • Influenza, Unspecified 12/04/2018   • Pneumococcal Polysaccharide (PPSV23) 04/17/2014   • Td 10/02/2005   • Tdap 04/17/2014, 09/15/2019   • Zostavax 08/24/2012       Objective   Vitals:    10/21/20 1535 10/23/20 1005   BP:  120/76   Temp: 97.9 °F (36.6 °C)    Weight: 91.6 kg (202 lb)    Height: 172.7 cm (68\")      Body mass index is 30.71 kg/m².  Physical Exam  Vitals signs reviewed.   Constitutional:       Appearance: She is well-developed.   HENT:      Head: Normocephalic and atraumatic.      Mouth/Throat:      Mouth: Mucous membranes are moist.      Pharynx: Oropharynx is clear.   Eyes:      Extraocular Movements: Extraocular movements intact.      Conjunctiva/sclera: Conjunctivae normal.      Pupils: Pupils are equal, round, and reactive to light.   Neck:      Musculoskeletal: Neck supple.      Thyroid: No thyromegaly.      Vascular: No carotid bruit.   Cardiovascular:      Rate and Rhythm: Normal rate and regular rhythm.      Heart sounds: Normal heart sounds. No murmur.   Pulmonary:      Effort: Pulmonary effort is normal. No retractions.      Breath sounds: Normal breath sounds.   Chest:      Breasts: Breasts are symmetrical.         Right: Normal. No inverted nipple, mass, nipple discharge, skin change or tenderness.         Left: Normal. No inverted nipple, mass, nipple discharge, skin change or tenderness.   Abdominal:      General: " There is no distension.      Palpations: Abdomen is soft. There is no mass.      Tenderness: There is no abdominal tenderness. There is no rebound.   Lymphadenopathy:      Cervical: No cervical adenopathy.      Upper Body:      Right upper body: No supraclavicular, axillary or pectoral adenopathy.      Left upper body: No supraclavicular, axillary or pectoral adenopathy.   Skin:     General: Skin is warm.   Neurological:      Mental Status: She is alert.   Psychiatric:         Behavior: Behavior normal.           Assessment/Plan   Diagnoses and all orders for this visit:    1. Screening mammogram, encounter for (Primary)  -     Mammo Screening Bilateral With CAD    2. Type 2 diabetes mellitus without complication, without long-term current use of insulin (CMS/[facility])  -     Hemoglobin A1c; Future  -     Hemoglobin A1c; Future  -     Comprehensive Metabolic Panel; Future  -     Hemoglobin A1c; Future  -     Microalbumin / Creatinine Urine Ratio - Urine, Clean Catch; Future    3. Healthcare maintenance    4. Mixed hyperlipidemia  -     Lipid Panel With / Chol / HDL Ratio; Future    5. Essential hypertension    6. Vitamin D deficiency    7. CAMEJO (nonalcoholic steatohepatitis)    8. Acquired hypothyroidism  -     TSH; Future        1. CPE:Ordered mammogram today. Recommended healthy diet, and 150 min of aerobic exercise per week      2. DM: A1c is 7.1 increased from 6 mo ago. Work on diet carb reduction, recheck in 3 months if not to goal I will start her on Trulicity.     3. HTN: Controlled.     4. Hypothyroidism: Therapeutic.     5. HLD: Lipids to goal, LDL 70.     Return in about 6 months (around 4/21/2021) for Lab in 3 months. FUP in 6 mo with lab appt prior.           Answers for HPI/ROS submitted by the patient on 10/19/2020   What is the primary reason for your visit?: Physical

## 2020-10-23 VITALS
SYSTOLIC BLOOD PRESSURE: 120 MMHG | TEMPERATURE: 97.9 F | DIASTOLIC BLOOD PRESSURE: 76 MMHG | BODY MASS INDEX: 30.62 KG/M2 | WEIGHT: 202 LBS | HEIGHT: 68 IN

## 2020-10-25 DIAGNOSIS — E03.9 ACQUIRED HYPOTHYROIDISM: ICD-10-CM

## 2020-10-25 DIAGNOSIS — I10 ESSENTIAL HYPERTENSION: ICD-10-CM

## 2020-10-25 DIAGNOSIS — F41.8 MIXED ANXIETY DEPRESSIVE DISORDER: ICD-10-CM

## 2020-10-25 DIAGNOSIS — E11.9 TYPE 2 DIABETES MELLITUS WITHOUT COMPLICATION, WITHOUT LONG-TERM CURRENT USE OF INSULIN (HCC): ICD-10-CM

## 2020-10-26 RX ORDER — METOPROLOL SUCCINATE 100 MG/1
TABLET, EXTENDED RELEASE ORAL
Qty: 90 TABLET | Refills: 0 | Status: SHIPPED | OUTPATIENT
Start: 2020-10-26 | End: 2021-01-19

## 2020-10-26 RX ORDER — LEVOTHYROXINE SODIUM 88 UG/1
TABLET ORAL
Qty: 90 TABLET | Refills: 0 | Status: SHIPPED | OUTPATIENT
Start: 2020-10-26 | End: 2021-01-19

## 2020-10-26 RX ORDER — FLUOXETINE 10 MG/1
CAPSULE ORAL
Qty: 90 CAPSULE | Refills: 0 | Status: SHIPPED | OUTPATIENT
Start: 2020-10-26 | End: 2020-11-12 | Stop reason: SDUPTHER

## 2020-10-26 RX ORDER — EMPAGLIFLOZIN 25 MG/1
TABLET, FILM COATED ORAL
Qty: 30 TABLET | Refills: 0 | Status: SHIPPED | OUTPATIENT
Start: 2020-10-26 | End: 2020-12-10

## 2020-10-28 ENCOUNTER — TRANSCRIBE ORDERS (OUTPATIENT)
Dept: ADMINISTRATIVE | Facility: HOSPITAL | Age: 65
End: 2020-10-28

## 2020-10-28 DIAGNOSIS — Z12.31 SCREENING MAMMOGRAM, ENCOUNTER FOR: Primary | ICD-10-CM

## 2020-11-03 ENCOUNTER — TELEPHONE (OUTPATIENT)
Dept: INTERNAL MEDICINE | Facility: CLINIC | Age: 65
End: 2020-11-03

## 2020-11-03 NOTE — TELEPHONE ENCOUNTER
PATIENT STATED SHE RECEIVED A LETTER FROM [facility], AND THAT SHE WANTS TO  MAKE SURE THAT IS THE SAME PAPER THAT DILCIA TOLD HER TO TAKE TO THE LAB WITH HER.     PLEASE CALL AND ADVISE: [phone]

## 2020-11-12 DIAGNOSIS — F41.8 MIXED ANXIETY DEPRESSIVE DISORDER: ICD-10-CM

## 2020-11-12 DIAGNOSIS — E11.9 TYPE 2 DIABETES MELLITUS WITHOUT COMPLICATION, WITHOUT LONG-TERM CURRENT USE OF INSULIN (HCC): Primary | ICD-10-CM

## 2020-11-12 RX ORDER — LANCETS
EACH MISCELLANEOUS
Qty: 100 EACH | Refills: 1 | Status: SHIPPED | OUTPATIENT
Start: 2020-11-12

## 2020-11-12 RX ORDER — BLOOD-GLUCOSE METER
1 KIT MISCELLANEOUS AS NEEDED
Qty: 1 EACH | Refills: 0 | Status: SHIPPED | OUTPATIENT
Start: 2020-11-12

## 2020-11-12 RX ORDER — FLUOXETINE 10 MG/1
CAPSULE ORAL
Qty: 90 CAPSULE | Refills: 0 | Status: SHIPPED | OUTPATIENT
Start: 2020-11-12 | End: 2021-03-08

## 2020-11-13 ENCOUNTER — TELEPHONE (OUTPATIENT)
Dept: INTERNAL MEDICINE | Facility: CLINIC | Age: 65
End: 2020-11-13

## 2020-11-16 ENCOUNTER — TELEPHONE (OUTPATIENT)
Dept: ORTHOPEDIC SURGERY | Facility: CLINIC | Age: 65
End: 2020-11-16

## 2020-11-16 NOTE — TELEPHONE ENCOUNTER
----- Message from Namrata Luz sent at 11/14/2020  8:37 AM EST -----  Regarding: Non-Urgent Medical Question  Contact: [phone]  MY CHART MESSAGE:    Is there a possibility that my surgery may get postposed. It is scheduled on December 18.    If there is I will need to adjust some of my personal details. I am ok if it does. Totally understand.     Thank you   Namrata Luz

## 2020-11-16 NOTE — TELEPHONE ENCOUNTER
T.T. patient today.. She heard on the news that their may be a possibility that elective surgery may get postponed  due to covid cases rising. I told patient I haven't heard of anything like that happening, and she is scheduled for her surgery as planned for 12/18/20.

## 2020-12-01 ENCOUNTER — TRANSCRIBE ORDERS (OUTPATIENT)
Dept: PREADMISSION TESTING | Facility: HOSPITAL | Age: 65
End: 2020-12-01

## 2020-12-01 DIAGNOSIS — Z01.818 OTHER SPECIFIED PRE-OPERATIVE EXAMINATION: Primary | ICD-10-CM

## 2020-12-03 ENCOUNTER — APPOINTMENT (OUTPATIENT)
Dept: PREADMISSION TESTING | Facility: HOSPITAL | Age: 65
End: 2020-12-03

## 2020-12-03 VITALS
SYSTOLIC BLOOD PRESSURE: 125 MMHG | TEMPERATURE: 97.9 F | HEIGHT: 68 IN | DIASTOLIC BLOOD PRESSURE: 74 MMHG | RESPIRATION RATE: 20 BRPM | BODY MASS INDEX: 30.92 KG/M2 | WEIGHT: 204 LBS | OXYGEN SATURATION: 97 % | HEART RATE: 65 BPM

## 2020-12-03 DIAGNOSIS — M17.11 PRIMARY OSTEOARTHRITIS OF RIGHT KNEE: ICD-10-CM

## 2020-12-03 LAB
ANION GAP SERPL CALCULATED.3IONS-SCNC: 6.1 MMOL/L (ref 5–15)
BACTERIA UR QL AUTO: ABNORMAL /HPF
BILIRUB UR QL STRIP: NEGATIVE
BUN SERPL-MCNC: 17 MG/DL (ref 8–23)
BUN/CREAT SERPL: 20.2 (ref 7–25)
CALCIUM SPEC-SCNC: 9.5 MG/DL (ref 8.6–10.5)
CHLORIDE SERPL-SCNC: 106 MMOL/L (ref 98–107)
CLARITY UR: CLEAR
CO2 SERPL-SCNC: 25.9 MMOL/L (ref 22–29)
COLOR UR: YELLOW
CREAT SERPL-MCNC: 0.84 MG/DL (ref 0.57–1)
DEPRECATED RDW RBC AUTO: 46.3 FL (ref 37–54)
ERYTHROCYTE [DISTWIDTH] IN BLOOD BY AUTOMATED COUNT: 14.8 % (ref 12.3–15.4)
GFR SERPL CREATININE-BSD FRML MDRD: 68 ML/MIN/1.73
GLUCOSE SERPL-MCNC: 111 MG/DL (ref 65–99)
GLUCOSE UR STRIP-MCNC: ABNORMAL MG/DL
HCT VFR BLD AUTO: 41.6 % (ref 34–46.6)
HGB BLD-MCNC: 13.9 G/DL (ref 12–15.9)
HGB UR QL STRIP.AUTO: ABNORMAL
HYALINE CASTS UR QL AUTO: ABNORMAL /LPF
KETONES UR QL STRIP: NEGATIVE
LEUKOCYTE ESTERASE UR QL STRIP.AUTO: NEGATIVE
MCH RBC QN AUTO: 28.8 PG (ref 26.6–33)
MCHC RBC AUTO-ENTMCNC: 33.4 G/DL (ref 31.5–35.7)
MCV RBC AUTO: 86.3 FL (ref 79–97)
NITRITE UR QL STRIP: NEGATIVE
PH UR STRIP.AUTO: 7 [PH] (ref 5–8)
PLATELET # BLD AUTO: 294 10*3/MM3 (ref 140–450)
PMV BLD AUTO: 10.1 FL (ref 6–12)
POTASSIUM SERPL-SCNC: 4.2 MMOL/L (ref 3.5–5.2)
PROT UR QL STRIP: NEGATIVE
QT INTERVAL: 437 MS
RBC # BLD AUTO: 4.82 10*6/MM3 (ref 3.77–5.28)
RBC # UR: ABNORMAL /HPF
REF LAB TEST METHOD: ABNORMAL
SODIUM SERPL-SCNC: 138 MMOL/L (ref 136–145)
SP GR UR STRIP: 1.02 (ref 1–1.03)
SQUAMOUS #/AREA URNS HPF: ABNORMAL /HPF
UROBILINOGEN UR QL STRIP: ABNORMAL
WBC # BLD AUTO: 7.33 10*3/MM3 (ref 3.4–10.8)
WBC UR QL AUTO: ABNORMAL /HPF

## 2020-12-03 PROCEDURE — 93010 ELECTROCARDIOGRAM REPORT: CPT | Performed by: INTERNAL MEDICINE

## 2020-12-03 PROCEDURE — 80048 BASIC METABOLIC PNL TOTAL CA: CPT

## 2020-12-03 PROCEDURE — 93005 ELECTROCARDIOGRAM TRACING: CPT

## 2020-12-03 PROCEDURE — 85027 COMPLETE CBC AUTOMATED: CPT

## 2020-12-03 PROCEDURE — 81001 URINALYSIS AUTO W/SCOPE: CPT

## 2020-12-03 PROCEDURE — 36415 COLL VENOUS BLD VENIPUNCTURE: CPT

## 2020-12-03 RX ORDER — CHLORHEXIDINE GLUCONATE 500 MG/1
1 CLOTH TOPICAL
COMMUNITY
End: 2020-12-18 | Stop reason: HOSPADM

## 2020-12-03 RX ORDER — MULTIVIT-MIN/IRON/FOLIC ACID/K 18-600-40
2000 CAPSULE ORAL DAILY
COMMUNITY
End: 2021-10-04

## 2020-12-03 ASSESSMENT — KOOS JR
KOOS JR SCORE: 42.281
KOOS JR SCORE: 18

## 2020-12-03 NOTE — DISCHARGE INSTRUCTIONS
Take the following medications the morning of surgery:    Levothyroxine   metoprolol    If you are on prescription narcotic pain medication to control your pain you may also take that medication the morning of surgery.    General Instructions:  • Do not eat solid food after midnight the night before surgery.  • You may drink clear liquids day of surgery but must stop at least one hour before your hospital arrival time.  • It is beneficial for you to have a clear drink that contains carbohydrates the day of surgery.  We suggest a 12 to 20 ounce bottle of Gatorade or Powerade for non-diabetic patients or a 12 to 20 ounce bottle of G2 or Powerade Zero for diabetic patients. (Pediatric patients, are not advised to drink a 12 to 20 ounce carbohydrate drink)    Clear liquids are liquids you can see through.  Nothing red in color.     Plain water                               Sports drinks  Sodas                                   Gelatin (Jell-O)  Fruit juices without pulp such as white grape juice and apple juice  Popsicles that contain no fruit or yogurt  Tea or coffee (no cream or milk added)  Gatorade / Powerade  G2 / Powerade Zero    • Infants may have breast milk up to four hours before surgery.  • Infants drinking formula may drink formula up to six hours before surgery.   • Patients who avoid smoking, chewing tobacco and alcohol for 4 weeks prior to surgery have a reduced risk of post-operative complications.  Quit smoking as many days before surgery as you can.  • Do not smoke, use chewing tobacco or drink alcohol the day of surgery.   • If applicable bring your C-PAP/ BI-PAP machine.  • Bring any papers given to you in the doctor’s office.  • Wear clean comfortable clothes.  • Do not wear contact lenses, false eyelashes or make-up.  Bring a case for your glasses.   • Bring crutches or walker if applicable.  • Remove all piercings.  Leave jewelry and any other valuables at home.  • Hair extensions with metal clips  must be removed prior to surgery.  • The Pre-Admission Testing nurse will instruct you to bring medications if unable to obtain an accurate list in Pre-Admission Testing.        If you were given a blood bank ID arm band remember to bring it with you the day of surgery.    Preventing a Surgical Site Infection:  • For 2 to 3 days before surgery, avoid shaving with a razor because the razor can irritate skin and make it easier to develop an infection.    • Any areas of open skin can increase the risk of a post-operative wound infection by allowing bacteria to enter and travel throughout the body.  Notify your surgeon if you have any skin wounds / rashes even if it is not near the expected surgical site.  The area will need assessed to determine if surgery should be delayed until it is healed.  • The night prior to surgery shower using a fresh bar of anti-bacterial soap (such as Dial) and clean washcloth.  Sleep in a clean bed with clean clothing.  Do not allow pets to sleep with you.  • Shower on the morning of surgery using a fresh bar of anti-bacterial soap (such as Dial) and clean washcloth.  Dry with a clean towel and dress in clean clothing.  • Ask your surgeon if you will be receiving antibiotics prior to surgery.  • Make sure you, your family, and all healthcare providers clean their hands with soap and water or an alcohol based hand  before caring for you or your wound.    Day of surgery:12/18/2020   MD office to  Tell pt when to arrive for  surgery   Your arrival time is approximately two hours before your scheduled surgery time.  Upon arrival, a Pre-op nurse and Anesthesiologist will review your health history, obtain vital signs, and answer questions you may have.  The only belongings needed at this time will be a list of your home medications and if applicable your C-PAP/BI-PAP machine.  A Pre-op nurse will start an IV and you may receive medication in preparation for surgery, including something  to help you relax.     Please be aware that surgery does come with discomfort.  We want to make every effort to control your discomfort so please discuss any uncontrolled symptoms with your nurse.   Your doctor will most likely have prescribed pain medications.      If you are going home after surgery you will receive individualized written care instructions before being discharged.  A responsible adult must drive you to and from the hospital on the day of your surgery and stay with you for 24 hours.    If you are staying overnight following surgery, you will be transported to your hospital room following the recovery period.  [facility] has all private rooms.    If you have any questions please call Pre-Admission Testing at [phone].  Deductibles and co-payments are collected on the day of service. Please be prepared to pay the required co-pay, deductible or deposit on the day of service as defined by your plan.    Patient Education for Self-Quarantine Process    Following your COVID testing, we strongly recommend that you do not leave your home after you have been tested for COVID except to get medical care. This includes not going to work, school or to public areas.  If this is not possible for you to do please limit your activities to only required outings.  Be sure to wear a mask when you are with other people, practice social distancing and wash your hands frequently.      The following items provide additional details to keep you safe.  • Wash your hands with soap and water frequently for at least 20 seconds.   • Avoid touching your eyes, nose and mouth with unwashed hands.  • Do not share anything - utensils, towels, food from the same bowl.   • Have your own utensils, drinking glass, dishes, towels and bedding.   • Do not have visitors.   • Do use FaceTime to stay in touch with family and friends.  • You should stay in a specific room away from others if possible.   • Stay at least 6  feet away from others in the home if you cannot have a dedicated room to yourself.   • Do not snuggle with your pet. While the CDC says there is no evidence that pets can spread COVID-19 or be infected from humans, it is probably best to avoid “petting, snuggling, being kissed or licked and sharing food (during self-quarantine)”, according to the CDC.   • Sanitize household surfaces daily. Include all high touch areas (door handles, light switches, phones, countertops, etc.)  • Do not share a bathroom with others, if possible.   • Wear a mask around others in your home if you are unable to stay in a separate room or 6 feet apart. If  you are unable to wear a mask, have your family member wear a mask if they must be within 6 feet of you.   Call your surgeon immediately if you experience any of the following symptoms:  • Sore Throat  • Shortness of Breath or difficulty breathing  • Cough  • Chills  • Body soreness or muscle pain  • Headache  • Fever  • New loss of taste or smell  • Do not arrive for your surgery ill.  Your procedure will need to be rescheduled to another time.  You will need to call your physician before the day of surgery to avoid any unnecessary exposure to hospital staff as well as other patients.    CHLORHEXIDINE CLOTH INSTRUCTIONS  The morning of surgery follow these instructions using the Chlorhexidine cloths you've been given.  These steps reduce bacteria on the body.  Do not use the cloths near your eyes, ears mouth, genitalia or on open wounds.  Throw the cloths away after use but do not try to flush them down a toilet.      • Open and remove one cloth at a time from the package.    • Leave the cloth unfolded and begin the bathing.  • Massage the skin with the cloths using gentle pressure to remove bacteria.  Do not scrub harshly.   • Follow the steps below with one 2% CHG cloth per area (6 total cloths).  • One cloth for neck, shoulders and chest.  • One cloth for both arms, hands, fingers  and underarms (do underarms last).  • One cloth for the abdomen followed by groin.  • One cloth for right leg and foot including between the toes.  • One cloth for left leg and foot including between the toes.  • The last cloth is to be used for the back of the neck, back and buttocks.    Allow the CHG to air dry 3 minutes on the skin which will give it time to work and decrease the chance of irritation.  The skin may feel sticky until it is dry.  Do not rinse with water or any other liquid or you will lose the beneficial effects of the CHG.  If mild skin irritation occurs, do rinse the skin to remove the CHG.  Report this to the nurse at time of admission.  Do not apply lotions, creams, ointments, deodorants or perfumes after using the clothes. Dress in clean clothes before coming to the hospital.    BACTROBAN NASAL OINTMENT  There are many germs normally in your nose. Bactroban is an ointment that will help reduce these germs. Please follow these instructions for Bactroban use:      __1__The day before surgery in the morning  Date_12/17/2020_______    _2___The day before surgery in the evening              Date_12/17/2020_______    __3__The day of surgery in the morning    Date_12/18/2020_______    **Squirt ½ package of Bactroban Ointment onto a cotton applicator and apply to inside of 1st nostril.  Squirt the remaining Bactroban and apply to the inside of the other nostril.    PERIDEX- ORAL:  Use only if your surgeon has ordered  Use the night before and morning of surgery - Swish, gargle, and spit - do not swallow.

## 2020-12-10 ENCOUNTER — OFFICE VISIT (OUTPATIENT)
Dept: ORTHOPEDIC SURGERY | Facility: CLINIC | Age: 65
End: 2020-12-10

## 2020-12-10 VITALS
TEMPERATURE: 97.7 F | WEIGHT: 200 LBS | DIASTOLIC BLOOD PRESSURE: 62 MMHG | HEIGHT: 68 IN | BODY MASS INDEX: 30.31 KG/M2 | SYSTOLIC BLOOD PRESSURE: 108 MMHG

## 2020-12-10 DIAGNOSIS — E11.9 TYPE 2 DIABETES MELLITUS WITHOUT COMPLICATION, WITHOUT LONG-TERM CURRENT USE OF INSULIN (HCC): ICD-10-CM

## 2020-12-10 DIAGNOSIS — M17.11 PRIMARY OSTEOARTHRITIS OF RIGHT KNEE: Primary | ICD-10-CM

## 2020-12-10 PROCEDURE — S0260 H&P FOR SURGERY: HCPCS | Performed by: NURSE PRACTITIONER

## 2020-12-10 RX ORDER — EMPAGLIFLOZIN 25 MG/1
TABLET, FILM COATED ORAL
Qty: 30 TABLET | Refills: 0 | Status: SHIPPED | OUTPATIENT
Start: 2020-12-10 | End: 2021-01-05

## 2020-12-10 NOTE — H&P
Patient: Namraat Luz    Date of Admission: 12/18/2020    YOB: 1955    Medical Record Number: [id number]    Admitting Physician: Dr. Emanuel Jack    Reason for Admission: End Stage Right Knee OA    History of Present Illness: 65 y.o. female presents with severe end stage knee osteoarthritis which has not been responsive to the full compliment of conservative measures. Despite conservative attempts, there is still severe, constant activity limiting pain. Given the severity of the pain, the patient has elected to proceed with knee replacement.    Allergies:   Allergies   Allergen Reactions   • Morphine And Related Hives   • Penicillins Hives   • Morphine Hives         Current Medications:  Home Medications:    Current Outpatient Medications on File Prior to Visit   Medication Sig   • ACCU-CHEK KARMA PLUS test strip USE AS DIRECTED TO TEST BLOOD SUGAR DAILY   • Accu-Chek Softclix Lancets lancets USE AS DIRECTED TO TEST BLOOD SUGAR DAILY   • albuterol sulfate  (90 Base) MCG/ACT inhaler Inhale 2 puffs Every 4 (Four) Hours As Needed for Wheezing.   • atorvastatin (LIPITOR) 40 MG tablet TAKE 1 TABLET DAILY AS DIRECTED   • Chlorhexidine Gluconate Cloth 2 % pads Apply 1 application topically. USE AS DIRECTED PREOP   • clobetasol (TEMOVATE) 0.05 % external solution Apply 1 application topically to the appropriate area as directed As Needed (for psoriasis).   • fenofibrate (TRICOR) 145 MG tablet Take 1 tablet by mouth Daily.   • fluocinolone (SYNALAR) 0.01 % external solution Apply  topically 2 (Two) Times a Day. (Patient taking differently: Apply 1 application topically As Needed.)   • FLUoxetine (PROzac) 10 MG capsule Take 1 capsule by mouth once daily   • fluticasone (FLONASE) 50 MCG/ACT nasal spray 2 sprays into the nostril(s) as directed by provider Daily.   • glucose monitor monitoring kit 1 each As Needed (test as directed). accu check avia plus meter   • levothyroxine (SYNTHROID, LEVOTHROID) 88  MCG tablet Take 1 tablet by mouth once daily   • lisinopril (PRINIVIL,ZESTRIL) 5 MG tablet Take 1 tablet by mouth Daily.   • metFORMIN (GLUCOPHAGE) 1000 MG tablet Take 1 tablet by mouth 2 (Two) Times a Day.   • metoprolol succinate XL (TOPROL-XL) 100 MG 24 hr tablet TAKE 1 TABLET BY MOUTH IN THE MORNING   • mupirocin (BACTROBAN) 2 % nasal ointment 1 application into the nostril(s) as directed by provider. USE AS DIRECTED PREOP   • Vitamin D, Cholecalciferol, 50 MCG (2000 UT) capsule Take 2,000 Units by mouth Daily.   • [DISCONTINUED] FLUoxetine (PROzac) 10 MG capsule Take 1 capsule by mouth once daily   • [DISCONTINUED] Jardiance 25 MG tablet TAKE 1 TABLET (25MG) BY MOUTH ONCE DAILY     Current Facility-Administered Medications on File Prior to Visit   Medication   • Chlorhexidine Gluconate 2 % pads 2 each     PRN Meds:.    PMH:     Past Medical History:   Diagnosis Date   • Allergic rhinitis    • Anxiety    • Arthritis    • Asthma    • Diabetes mellitus (CMS/HCC)    • Disease of thyroid gland    • Endometriosis    • Fractures 2007   • Hyperlipidemia    • Hypertension     ON TOPROL for a diagnosis of MVP but was later determined no MVp and MD decided not to stop since she has been on a while. lisinopril is prescribed for diabetes   • Limited joint range of motion     LT KNEE   • CAMEJO (nonalcoholic steatohepatitis)    • Prediabetes    • Primary osteoarthritis of right knee 10/6/2020   • Psoriasis    • Renal insufficiency    • Sleep apnea     USES C-PAP   • Tachycardia    • Vitamin D deficiency        PF/Surg/Soc Hx:     Past Surgical History:   Procedure Laterality Date   • APPENDECTOMY     • BACK SURGERY  1982   • CATARACT EXTRACTION, BILATERAL Bilateral 2015   • CHOLECYSTECTOMY  2000'S   • COLONOSCOPY     • EYE SURGERY     • HYSTERECTOMY  1982   • JOINT REPLACEMENT     • KNEE ARTHROPLASTY UNICOMPARTMENTAL Left 6/1/2018    Procedure: KNEE ARTHROPLASTY UNICOMPARTMENTAL;  Surgeon: Emanuel Jack MD;  Location: [facility]  "MAIN OR;  Service: Orthopedics   • LUMBAR DISCECTOMY     • TIBIA FASCIOTOMY Left 2006   • TUBAL ABDOMINAL LIGATION Bilateral         Social History     Occupational History   • Not on file   Tobacco Use   • Smoking status: Never Smoker   • Smokeless tobacco: Never Used   • Tobacco comment: lots of second hand smoke during childhood   Substance and Sexual Activity   • Alcohol use: Yes     Alcohol/week: 0.0 standard drinks     Frequency: Monthly or less     Comment: maybe have 1 drink every couple of months   • Drug use: Never   • Sexual activity: Not on file      Social History     Social History Narrative   • Not on file        Family History   Problem Relation Age of Onset   • Vaginal cancer Mother    • Lung cancer Mother    • Arthritis Mother    • Diabetes Mother            • Cancer Mother         Lung   • Coronary artery disease Father    • Arthritis Father    • Kidney cancer Father    • Cancer Father         Kidney   • Coronary artery disease Brother    • Asthma Sister    • Osteoporosis Sister    • Osteoporosis Maternal Grandmother            • Osteoporosis Sister         Being treated   • Malig Hyperthermia Neg Hx          Review of Systems:   A 14 point review of systems was performed, pertinent positives discussed above, all other systems are negative    Physical Exam: 65 y.o. female  Vital Signs :   Vitals:    12/10/20 1415   BP: 108/62   Temp: 97.7 °F (36.5 °C)   Weight: 90.7 kg (200 lb)   Height: 172.7 cm (68\")   PainSc:   8     General: Alert and Oriented x 3, No acute distress.  Psych: mood and affect appropriate; recent and remote memory intact  Eyes: conjunctiva clear; pupils equally round and reactive, sclera nonicteric  CV: no peripheral edema  Resp: normal respiratory effort  Skin: no rashes or wounds; normal turgor  Musculosketetal; pain and crepitance with knee range of motion  Vascular: palpable distal pulses    Xrays:  -3 views (AP, lateral, and sunrise) were ordered and " reviewed demonstrating end-stage OA with bone on bone articulation.  -A full length AP xray was ordered and reviewed today for purposes of operative alignment demonstrating end stage arthritic findings. There are no previous full length films for review    Assessment:  End-stage Right knee osteoarthritis. Conservative measures have failed.      Plan:  The plan is to proceed with Right Total Knee Replacement. The patient voiced understanding of the risks, benefits, and alternative forms of treatment that were discussed with Dr Jack at the time of scheduling. Same day STEVE Smallwood, APRN  12/10/2020

## 2020-12-10 NOTE — H&P (VIEW-ONLY)
Patient: Namrata Luz    Date of Admission: 12/18/2020    YOB: 1955    Medical Record Number: [id number]    Admitting Physician: Dr. Emanuel Jack    Reason for Admission: End Stage Right Knee OA    History of Present Illness: 65 y.o. female presents with severe end stage knee osteoarthritis which has not been responsive to the full compliment of conservative measures. Despite conservative attempts, there is still severe, constant activity limiting pain. Given the severity of the pain, the patient has elected to proceed with knee replacement.    Allergies:   Allergies   Allergen Reactions   • Morphine And Related Hives   • Penicillins Hives   • Morphine Hives         Current Medications:  Home Medications:    Current Outpatient Medications on File Prior to Visit   Medication Sig   • ACCU-CHEK KARMA PLUS test strip USE AS DIRECTED TO TEST BLOOD SUGAR DAILY   • Accu-Chek Softclix Lancets lancets USE AS DIRECTED TO TEST BLOOD SUGAR DAILY   • albuterol sulfate  (90 Base) MCG/ACT inhaler Inhale 2 puffs Every 4 (Four) Hours As Needed for Wheezing.   • atorvastatin (LIPITOR) 40 MG tablet TAKE 1 TABLET DAILY AS DIRECTED   • Chlorhexidine Gluconate Cloth 2 % pads Apply 1 application topically. USE AS DIRECTED PREOP   • clobetasol (TEMOVATE) 0.05 % external solution Apply 1 application topically to the appropriate area as directed As Needed (for psoriasis).   • fenofibrate (TRICOR) 145 MG tablet Take 1 tablet by mouth Daily.   • fluocinolone (SYNALAR) 0.01 % external solution Apply  topically 2 (Two) Times a Day. (Patient taking differently: Apply 1 application topically As Needed.)   • FLUoxetine (PROzac) 10 MG capsule Take 1 capsule by mouth once daily   • fluticasone (FLONASE) 50 MCG/ACT nasal spray 2 sprays into the nostril(s) as directed by provider Daily.   • glucose monitor monitoring kit 1 each As Needed (test as directed). accu check avia plus meter   • levothyroxine (SYNTHROID, LEVOTHROID) 88  MCG tablet Take 1 tablet by mouth once daily   • lisinopril (PRINIVIL,ZESTRIL) 5 MG tablet Take 1 tablet by mouth Daily.   • metFORMIN (GLUCOPHAGE) 1000 MG tablet Take 1 tablet by mouth 2 (Two) Times a Day.   • metoprolol succinate XL (TOPROL-XL) 100 MG 24 hr tablet TAKE 1 TABLET BY MOUTH IN THE MORNING   • mupirocin (BACTROBAN) 2 % nasal ointment 1 application into the nostril(s) as directed by provider. USE AS DIRECTED PREOP   • Vitamin D, Cholecalciferol, 50 MCG (2000 UT) capsule Take 2,000 Units by mouth Daily.   • [DISCONTINUED] FLUoxetine (PROzac) 10 MG capsule Take 1 capsule by mouth once daily   • [DISCONTINUED] Jardiance 25 MG tablet TAKE 1 TABLET (25MG) BY MOUTH ONCE DAILY     Current Facility-Administered Medications on File Prior to Visit   Medication   • Chlorhexidine Gluconate 2 % pads 2 each     PRN Meds:.    PMH:     Past Medical History:   Diagnosis Date   • Allergic rhinitis    • Anxiety    • Arthritis    • Asthma    • Diabetes mellitus (CMS/HCC)    • Disease of thyroid gland    • Endometriosis    • Fractures 2007   • Hyperlipidemia    • Hypertension     ON TOPROL for a diagnosis of MVP but was later determined no MVp and MD decided not to stop since she has been on a while. lisinopril is prescribed for diabetes   • Limited joint range of motion     LT KNEE   • CAMEJO (nonalcoholic steatohepatitis)    • Prediabetes    • Primary osteoarthritis of right knee 10/6/2020   • Psoriasis    • Renal insufficiency    • Sleep apnea     USES C-PAP   • Tachycardia    • Vitamin D deficiency        PF/Surg/Soc Hx:     Past Surgical History:   Procedure Laterality Date   • APPENDECTOMY     • BACK SURGERY  1982   • CATARACT EXTRACTION, BILATERAL Bilateral 2015   • CHOLECYSTECTOMY  2000'S   • COLONOSCOPY     • EYE SURGERY     • HYSTERECTOMY  1982   • JOINT REPLACEMENT     • KNEE ARTHROPLASTY UNICOMPARTMENTAL Left 6/1/2018    Procedure: KNEE ARTHROPLASTY UNICOMPARTMENTAL;  Surgeon: Emanuel Jack MD;  Location: [facility]  "MAIN OR;  Service: Orthopedics   • LUMBAR DISCECTOMY     • TIBIA FASCIOTOMY Left 2006   • TUBAL ABDOMINAL LIGATION Bilateral         Social History     Occupational History   • Not on file   Tobacco Use   • Smoking status: Never Smoker   • Smokeless tobacco: Never Used   • Tobacco comment: lots of second hand smoke during childhood   Substance and Sexual Activity   • Alcohol use: Yes     Alcohol/week: 0.0 standard drinks     Frequency: Monthly or less     Comment: maybe have 1 drink every couple of months   • Drug use: Never   • Sexual activity: Not on file      Social History     Social History Narrative   • Not on file        Family History   Problem Relation Age of Onset   • Vaginal cancer Mother    • Lung cancer Mother    • Arthritis Mother    • Diabetes Mother            • Cancer Mother         Lung   • Coronary artery disease Father    • Arthritis Father    • Kidney cancer Father    • Cancer Father         Kidney   • Coronary artery disease Brother    • Asthma Sister    • Osteoporosis Sister    • Osteoporosis Maternal Grandmother            • Osteoporosis Sister         Being treated   • Malig Hyperthermia Neg Hx          Review of Systems:   A 14 point review of systems was performed, pertinent positives discussed above, all other systems are negative    Physical Exam: 65 y.o. female  Vital Signs :   Vitals:    12/10/20 1415   BP: 108/62   Temp: 97.7 °F (36.5 °C)   Weight: 90.7 kg (200 lb)   Height: 172.7 cm (68\")   PainSc:   8     General: Alert and Oriented x 3, No acute distress.  Psych: mood and affect appropriate; recent and remote memory intact  Eyes: conjunctiva clear; pupils equally round and reactive, sclera nonicteric  CV: no peripheral edema  Resp: normal respiratory effort  Skin: no rashes or wounds; normal turgor  Musculosketetal; pain and crepitance with knee range of motion  Vascular: palpable distal pulses    Xrays:  -3 views (AP, lateral, and sunrise) were ordered and " reviewed demonstrating end-stage OA with bone on bone articulation.  -A full length AP xray was ordered and reviewed today for purposes of operative alignment demonstrating end stage arthritic findings. There are no previous full length films for review    Assessment:  End-stage Right knee osteoarthritis. Conservative measures have failed.      Plan:  The plan is to proceed with Right Total Knee Replacement. The patient voiced understanding of the risks, benefits, and alternative forms of treatment that were discussed with Dr Jack at the time of scheduling. Same day STEVE Smallwood, APRN  12/10/2020

## 2020-12-15 ENCOUNTER — LAB (OUTPATIENT)
Dept: LAB | Facility: HOSPITAL | Age: 65
End: 2020-12-15

## 2020-12-15 DIAGNOSIS — Z01.818 OTHER SPECIFIED PRE-OPERATIVE EXAMINATION: ICD-10-CM

## 2020-12-15 PROCEDURE — C9803 HOPD COVID-19 SPEC COLLECT: HCPCS

## 2020-12-15 PROCEDURE — U0004 COV-19 TEST NON-CDC HGH THRU: HCPCS

## 2020-12-16 ENCOUNTER — APPOINTMENT (OUTPATIENT)
Dept: LAB | Facility: HOSPITAL | Age: 65
End: 2020-12-16

## 2020-12-16 LAB — SARS-COV-2 RNA RESP QL NAA+PROBE: NOT DETECTED

## 2020-12-18 ENCOUNTER — HOSPITAL ENCOUNTER (OUTPATIENT)
Facility: HOSPITAL | Age: 65
Discharge: HOME HEALTH | End: 2020-12-18
Attending: ORTHOPAEDIC SURGERY | Admitting: ORTHOPAEDIC SURGERY

## 2020-12-18 ENCOUNTER — READMISSION MANAGEMENT (OUTPATIENT)
Dept: CALL CENTER | Facility: HOSPITAL | Age: 65
End: 2020-12-18

## 2020-12-18 ENCOUNTER — APPOINTMENT (OUTPATIENT)
Dept: GENERAL RADIOLOGY | Facility: HOSPITAL | Age: 65
End: 2020-12-18

## 2020-12-18 ENCOUNTER — ANESTHESIA EVENT (OUTPATIENT)
Dept: PERIOP | Facility: HOSPITAL | Age: 65
End: 2020-12-18

## 2020-12-18 ENCOUNTER — ANESTHESIA (OUTPATIENT)
Dept: PERIOP | Facility: HOSPITAL | Age: 65
End: 2020-12-18

## 2020-12-18 VITALS
SYSTOLIC BLOOD PRESSURE: 107 MMHG | DIASTOLIC BLOOD PRESSURE: 60 MMHG | TEMPERATURE: 97.1 F | RESPIRATION RATE: 16 BRPM | BODY MASS INDEX: 30.67 KG/M2 | OXYGEN SATURATION: 94 % | HEIGHT: 68 IN | WEIGHT: 202.38 LBS | HEART RATE: 64 BPM

## 2020-12-18 DIAGNOSIS — Z96.651 S/P TKR (TOTAL KNEE REPLACEMENT), RIGHT: Primary | ICD-10-CM

## 2020-12-18 DIAGNOSIS — M17.11 PRIMARY OSTEOARTHRITIS OF RIGHT KNEE: ICD-10-CM

## 2020-12-18 LAB — GLUCOSE BLDC GLUCOMTR-MCNC: 124 MG/DL (ref 70–130)

## 2020-12-18 PROCEDURE — 97110 THERAPEUTIC EXERCISES: CPT

## 2020-12-18 PROCEDURE — 25010000002 DEXAMETHASONE PER 1 MG: Performed by: NURSE ANESTHETIST, CERTIFIED REGISTERED

## 2020-12-18 PROCEDURE — 25010000002 ONDANSETRON PER 1 MG: Performed by: NURSE ANESTHETIST, CERTIFIED REGISTERED

## 2020-12-18 PROCEDURE — G0378 HOSPITAL OBSERVATION PER HR: HCPCS

## 2020-12-18 PROCEDURE — 25010000003 BUPIVACAINE LIPOSOME 1.3 % SUSPENSION 20 ML VIAL: Performed by: ORTHOPAEDIC SURGERY

## 2020-12-18 PROCEDURE — 25010000002 FENTANYL CITRATE (PF) 100 MCG/2ML SOLUTION: Performed by: NURSE ANESTHETIST, CERTIFIED REGISTERED

## 2020-12-18 PROCEDURE — 97161 PT EVAL LOW COMPLEX 20 MIN: CPT

## 2020-12-18 PROCEDURE — 27447 TOTAL KNEE ARTHROPLASTY: CPT | Performed by: ORTHOPAEDIC SURGERY

## 2020-12-18 PROCEDURE — 27447 TOTAL KNEE ARTHROPLASTY: CPT | Performed by: NURSE PRACTITIONER

## 2020-12-18 PROCEDURE — 25010000002 MIDAZOLAM PER 1 MG: Performed by: ANESTHESIOLOGY

## 2020-12-18 PROCEDURE — 25010000002 PROPOFOL 10 MG/ML EMULSION: Performed by: NURSE ANESTHETIST, CERTIFIED REGISTERED

## 2020-12-18 PROCEDURE — 25010000003 CEFAZOLIN IN DEXTROSE 2-4 GM/100ML-% SOLUTION: Performed by: ORTHOPAEDIC SURGERY

## 2020-12-18 PROCEDURE — 25010000002 VANCOMYCIN 10 G RECONSTITUTED SOLUTION: Performed by: ORTHOPAEDIC SURGERY

## 2020-12-18 PROCEDURE — 97530 THERAPEUTIC ACTIVITIES: CPT

## 2020-12-18 PROCEDURE — 82962 GLUCOSE BLOOD TEST: CPT

## 2020-12-18 PROCEDURE — 25010000002 ONDANSETRON PER 1 MG: Performed by: NURSE PRACTITIONER

## 2020-12-18 PROCEDURE — 25010000002 KETOROLAC TROMETHAMINE PER 15 MG: Performed by: NURSE PRACTITIONER

## 2020-12-18 PROCEDURE — C9290 INJ, BUPIVACAINE LIPOSOME: HCPCS | Performed by: ORTHOPAEDIC SURGERY

## 2020-12-18 PROCEDURE — C1713 ANCHOR/SCREW BN/BN,TIS/BN: HCPCS | Performed by: ORTHOPAEDIC SURGERY

## 2020-12-18 PROCEDURE — 73560 X-RAY EXAM OF KNEE 1 OR 2: CPT

## 2020-12-18 PROCEDURE — C1776 JOINT DEVICE (IMPLANTABLE): HCPCS | Performed by: ORTHOPAEDIC SURGERY

## 2020-12-18 DEVICE — LEGION CRUCIATE RETAINING HIGH                                    FLEX HIGHLY CROSS LINKED                                    POLYETHYLENE SIZE 3-4 9MM
Type: IMPLANTABLE DEVICE | Site: KNEE | Status: FUNCTIONAL
Brand: LEGION

## 2020-12-18 DEVICE — GENESIS II BICONVEX PATELLA 29MM
Type: IMPLANTABLE DEVICE | Site: KNEE | Status: FUNCTIONAL
Brand: GENESIS II

## 2020-12-18 DEVICE — GENESIS II NON-POROUS TIBIAL                                    BASEPLATE SIZE 3 RIGHT
Type: IMPLANTABLE DEVICE | Site: KNEE | Status: FUNCTIONAL
Brand: GENESIS II

## 2020-12-18 DEVICE — PALACOS® R IS A FAST-CURING, RADIOPAQUE, POLY(METHYL METHACRYLATE)-BASED BONE CEMENT.PALACOS ® R CONTAINS THE X-RAY CONTRAST MEDIUM ZIRCONIUM DIOXIDE. TO IMPROVE VISIBILITY IN THE SURGICAL FIELD PALACOS ® R HAS BEEN COLOURED WITH CHLOROPHYLL (E141). THE BONE CEMENT IS PREPARED DIRECTLY BEFORE USE BY MIXING A POLYMER POWDER COMPONENT WITH A LIQUID MONOMER COMPONENT. A DUCTILE DOUGH FORMS WHICH CURES WITHIN A FEW MINUTES.
Type: IMPLANTABLE DEVICE | Site: KNEE | Status: FUNCTIONAL
Brand: PALACOS®

## 2020-12-18 DEVICE — LEGION NARROW CRUCIATE RETAINING                                    OXINIUM SIZE 5N RIGHT
Type: IMPLANTABLE DEVICE | Site: KNEE | Status: FUNCTIONAL
Brand: LEGION

## 2020-12-18 DEVICE — DEV CONTRL TISS STRATAFIX SYMM PDS PLUS VIL CT-1 60CM: Type: IMPLANTABLE DEVICE | Site: KNEE | Status: FUNCTIONAL

## 2020-12-18 DEVICE — IMPLANTABLE DEVICE: Type: IMPLANTABLE DEVICE | Site: KNEE | Status: FUNCTIONAL

## 2020-12-18 DEVICE — DEV CONTRL TISS STRATAFIX SPIRAL MNCRYL UD 3/0 PLS 30CM: Type: IMPLANTABLE DEVICE | Site: KNEE | Status: FUNCTIONAL

## 2020-12-18 RX ORDER — PROMETHAZINE HYDROCHLORIDE 12.5 MG/1
12.5 TABLET ORAL EVERY 4 HOURS PRN
Status: DISCONTINUED | OUTPATIENT
Start: 2020-12-18 | End: 2020-12-18 | Stop reason: HOSPADM

## 2020-12-18 RX ORDER — OXYCODONE AND ACETAMINOPHEN 7.5; 325 MG/1; MG/1
1 TABLET ORAL ONCE AS NEEDED
Status: DISCONTINUED | OUTPATIENT
Start: 2020-12-18 | End: 2020-12-18 | Stop reason: HOSPADM

## 2020-12-18 RX ORDER — DEXAMETHASONE SODIUM PHOSPHATE 4 MG/ML
INJECTION, SOLUTION INTRA-ARTICULAR; INTRALESIONAL; INTRAMUSCULAR; INTRAVENOUS; SOFT TISSUE AS NEEDED
Status: DISCONTINUED | OUTPATIENT
Start: 2020-12-18 | End: 2020-12-18 | Stop reason: SURG

## 2020-12-18 RX ORDER — ONDANSETRON 2 MG/ML
INJECTION INTRAMUSCULAR; INTRAVENOUS AS NEEDED
Status: DISCONTINUED | OUTPATIENT
Start: 2020-12-18 | End: 2020-12-18 | Stop reason: SURG

## 2020-12-18 RX ORDER — HYDRALAZINE HYDROCHLORIDE 20 MG/ML
5 INJECTION INTRAMUSCULAR; INTRAVENOUS
Status: DISCONTINUED | OUTPATIENT
Start: 2020-12-18 | End: 2020-12-18 | Stop reason: HOSPADM

## 2020-12-18 RX ORDER — EPHEDRINE SULFATE 50 MG/ML
INJECTION, SOLUTION INTRAVENOUS AS NEEDED
Status: DISCONTINUED | OUTPATIENT
Start: 2020-12-18 | End: 2020-12-18 | Stop reason: SURG

## 2020-12-18 RX ORDER — CEFAZOLIN SODIUM 2 G/100ML
2 INJECTION, SOLUTION INTRAVENOUS ONCE
Status: COMPLETED | OUTPATIENT
Start: 2020-12-18 | End: 2020-12-18

## 2020-12-18 RX ORDER — PROMETHAZINE HYDROCHLORIDE 25 MG/1
25 SUPPOSITORY RECTAL ONCE AS NEEDED
Status: DISCONTINUED | OUTPATIENT
Start: 2020-12-18 | End: 2020-12-18 | Stop reason: HOSPADM

## 2020-12-18 RX ORDER — LIDOCAINE HYDROCHLORIDE 10 MG/ML
0.5 INJECTION, SOLUTION EPIDURAL; INFILTRATION; INTRACAUDAL; PERINEURAL ONCE AS NEEDED
Status: DISCONTINUED | OUTPATIENT
Start: 2020-12-18 | End: 2020-12-18 | Stop reason: HOSPADM

## 2020-12-18 RX ORDER — EPHEDRINE SULFATE 50 MG/ML
5 INJECTION, SOLUTION INTRAVENOUS ONCE AS NEEDED
Status: DISCONTINUED | OUTPATIENT
Start: 2020-12-18 | End: 2020-12-18 | Stop reason: HOSPADM

## 2020-12-18 RX ORDER — KETOROLAC TROMETHAMINE 30 MG/ML
30 INJECTION, SOLUTION INTRAMUSCULAR; INTRAVENOUS EVERY 8 HOURS
Status: DISCONTINUED | OUTPATIENT
Start: 2020-12-18 | End: 2020-12-18 | Stop reason: HOSPADM

## 2020-12-18 RX ORDER — WOUND DRESSING ADHESIVE - LIQUID
LIQUID MISCELLANEOUS AS NEEDED
Status: DISCONTINUED | OUTPATIENT
Start: 2020-12-18 | End: 2020-12-18 | Stop reason: HOSPADM

## 2020-12-18 RX ORDER — MIDAZOLAM HYDROCHLORIDE 1 MG/ML
1 INJECTION INTRAMUSCULAR; INTRAVENOUS
Status: DISCONTINUED | OUTPATIENT
Start: 2020-12-18 | End: 2020-12-18 | Stop reason: HOSPADM

## 2020-12-18 RX ORDER — DIPHENHYDRAMINE HCL 25 MG
25 CAPSULE ORAL
Status: DISCONTINUED | OUTPATIENT
Start: 2020-12-18 | End: 2020-12-18 | Stop reason: HOSPADM

## 2020-12-18 RX ORDER — PREGABALIN 75 MG/1
150 CAPSULE ORAL ONCE
Status: COMPLETED | OUTPATIENT
Start: 2020-12-18 | End: 2020-12-18

## 2020-12-18 RX ORDER — CEFAZOLIN SODIUM 2 G/100ML
2 INJECTION, SOLUTION INTRAVENOUS ONCE
Status: DISCONTINUED | OUTPATIENT
Start: 2020-12-18 | End: 2020-12-18 | Stop reason: HOSPADM

## 2020-12-18 RX ORDER — ONDANSETRON 4 MG/1
4 TABLET, FILM COATED ORAL EVERY 8 HOURS PRN
Qty: 10 TABLET | Refills: 0 | Status: SHIPPED | OUTPATIENT
Start: 2020-12-18 | End: 2021-06-04

## 2020-12-18 RX ORDER — FENTANYL CITRATE 50 UG/ML
50 INJECTION, SOLUTION INTRAMUSCULAR; INTRAVENOUS
Status: DISCONTINUED | OUTPATIENT
Start: 2020-12-18 | End: 2020-12-18 | Stop reason: HOSPADM

## 2020-12-18 RX ORDER — SODIUM CHLORIDE 0.9 % (FLUSH) 0.9 %
3 SYRINGE (ML) INJECTION EVERY 12 HOURS SCHEDULED
Status: DISCONTINUED | OUTPATIENT
Start: 2020-12-18 | End: 2020-12-18 | Stop reason: HOSPADM

## 2020-12-18 RX ORDER — MAGNESIUM HYDROXIDE 1200 MG/15ML
LIQUID ORAL AS NEEDED
Status: DISCONTINUED | OUTPATIENT
Start: 2020-12-18 | End: 2020-12-18 | Stop reason: HOSPADM

## 2020-12-18 RX ORDER — PROPOFOL 10 MG/ML
VIAL (ML) INTRAVENOUS CONTINUOUS PRN
Status: DISCONTINUED | OUTPATIENT
Start: 2020-12-18 | End: 2020-12-18 | Stop reason: SURG

## 2020-12-18 RX ORDER — ACETAMINOPHEN 325 MG/1
650 TABLET ORAL EVERY 6 HOURS PRN
Status: DISCONTINUED | OUTPATIENT
Start: 2020-12-18 | End: 2020-12-18 | Stop reason: HOSPADM

## 2020-12-18 RX ORDER — FAMOTIDINE 10 MG/ML
20 INJECTION, SOLUTION INTRAVENOUS ONCE
Status: COMPLETED | OUTPATIENT
Start: 2020-12-18 | End: 2020-12-18

## 2020-12-18 RX ORDER — HYDROCODONE BITARTRATE AND ACETAMINOPHEN 7.5; 325 MG/1; MG/1
1 TABLET ORAL ONCE AS NEEDED
Status: DISCONTINUED | OUTPATIENT
Start: 2020-12-18 | End: 2020-12-18 | Stop reason: HOSPADM

## 2020-12-18 RX ORDER — SODIUM CHLORIDE, SODIUM LACTATE, POTASSIUM CHLORIDE, CALCIUM CHLORIDE 600; 310; 30; 20 MG/100ML; MG/100ML; MG/100ML; MG/100ML
9 INJECTION, SOLUTION INTRAVENOUS CONTINUOUS
Status: DISCONTINUED | OUTPATIENT
Start: 2020-12-18 | End: 2020-12-18 | Stop reason: HOSPADM

## 2020-12-18 RX ORDER — ONDANSETRON 2 MG/ML
4 INJECTION INTRAMUSCULAR; INTRAVENOUS ONCE AS NEEDED
Status: COMPLETED | OUTPATIENT
Start: 2020-12-18 | End: 2020-12-18

## 2020-12-18 RX ORDER — ACETAMINOPHEN 10 MG/ML
1000 INJECTION, SOLUTION INTRAVENOUS ONCE
Status: COMPLETED | OUTPATIENT
Start: 2020-12-18 | End: 2020-12-18

## 2020-12-18 RX ORDER — POLYETHYLENE GLYCOL 3350 17 G/17G
17 POWDER, FOR SOLUTION ORAL 2 TIMES DAILY
Qty: 238 G | Refills: 0 | Status: SHIPPED | OUTPATIENT
Start: 2020-12-18 | End: 2020-12-25

## 2020-12-18 RX ORDER — LABETALOL HYDROCHLORIDE 5 MG/ML
5 INJECTION, SOLUTION INTRAVENOUS
Status: DISCONTINUED | OUTPATIENT
Start: 2020-12-18 | End: 2020-12-18 | Stop reason: HOSPADM

## 2020-12-18 RX ORDER — PROPOFOL 10 MG/ML
VIAL (ML) INTRAVENOUS AS NEEDED
Status: DISCONTINUED | OUTPATIENT
Start: 2020-12-18 | End: 2020-12-18 | Stop reason: SURG

## 2020-12-18 RX ORDER — ONDANSETRON 4 MG/1
4 TABLET, FILM COATED ORAL EVERY 6 HOURS PRN
Status: DISCONTINUED | OUTPATIENT
Start: 2020-12-18 | End: 2020-12-18 | Stop reason: HOSPADM

## 2020-12-18 RX ORDER — HYDROMORPHONE HYDROCHLORIDE 1 MG/ML
0.5 INJECTION, SOLUTION INTRAMUSCULAR; INTRAVENOUS; SUBCUTANEOUS
Status: DISCONTINUED | OUTPATIENT
Start: 2020-12-18 | End: 2020-12-18 | Stop reason: HOSPADM

## 2020-12-18 RX ORDER — NALOXONE HCL 0.4 MG/ML
0.2 VIAL (ML) INJECTION AS NEEDED
Status: DISCONTINUED | OUTPATIENT
Start: 2020-12-18 | End: 2020-12-18 | Stop reason: HOSPADM

## 2020-12-18 RX ORDER — PROMETHAZINE HYDROCHLORIDE 25 MG/1
25 TABLET ORAL ONCE AS NEEDED
Status: DISCONTINUED | OUTPATIENT
Start: 2020-12-18 | End: 2020-12-18 | Stop reason: HOSPADM

## 2020-12-18 RX ORDER — FENTANYL CITRATE 50 UG/ML
INJECTION, SOLUTION INTRAMUSCULAR; INTRAVENOUS AS NEEDED
Status: DISCONTINUED | OUTPATIENT
Start: 2020-12-18 | End: 2020-12-18 | Stop reason: SURG

## 2020-12-18 RX ORDER — PANTOPRAZOLE SODIUM 40 MG/1
40 TABLET, DELAYED RELEASE ORAL DAILY
Qty: 14 TABLET | Refills: 0 | Status: SHIPPED | OUTPATIENT
Start: 2020-12-18 | End: 2021-01-01

## 2020-12-18 RX ORDER — DIPHENHYDRAMINE HYDROCHLORIDE 50 MG/ML
12.5 INJECTION INTRAMUSCULAR; INTRAVENOUS
Status: DISCONTINUED | OUTPATIENT
Start: 2020-12-18 | End: 2020-12-18 | Stop reason: HOSPADM

## 2020-12-18 RX ORDER — HYDROCODONE BITARTRATE AND ACETAMINOPHEN 7.5; 325 MG/1; MG/1
TABLET ORAL
Qty: 60 TABLET | Refills: 0 | Status: SHIPPED | OUTPATIENT
Start: 2020-12-18 | End: 2020-12-28 | Stop reason: SDUPTHER

## 2020-12-18 RX ORDER — ASPIRIN 81 MG/1
81 TABLET ORAL 2 TIMES DAILY
Qty: 60 TABLET | Refills: 0 | Status: SHIPPED | OUTPATIENT
Start: 2020-12-19 | End: 2021-01-18

## 2020-12-18 RX ORDER — MELOXICAM 15 MG/1
15 TABLET ORAL ONCE
Status: COMPLETED | OUTPATIENT
Start: 2020-12-18 | End: 2020-12-18

## 2020-12-18 RX ORDER — LIDOCAINE HYDROCHLORIDE 20 MG/ML
INJECTION, SOLUTION INFILTRATION; PERINEURAL AS NEEDED
Status: DISCONTINUED | OUTPATIENT
Start: 2020-12-18 | End: 2020-12-18 | Stop reason: SURG

## 2020-12-18 RX ORDER — PREGABALIN 75 MG/1
150 CAPSULE ORAL ONCE
Status: DISCONTINUED | OUTPATIENT
Start: 2020-12-18 | End: 2020-12-18 | Stop reason: HOSPADM

## 2020-12-18 RX ORDER — ONDANSETRON 2 MG/ML
4 INJECTION INTRAMUSCULAR; INTRAVENOUS ONCE AS NEEDED
Status: DISCONTINUED | OUTPATIENT
Start: 2020-12-18 | End: 2020-12-18 | Stop reason: HOSPADM

## 2020-12-18 RX ORDER — TRANEXAMIC ACID 100 MG/ML
INJECTION, SOLUTION INTRAVENOUS AS NEEDED
Status: DISCONTINUED | OUTPATIENT
Start: 2020-12-18 | End: 2020-12-18 | Stop reason: SURG

## 2020-12-18 RX ORDER — BUPIVACAINE HYDROCHLORIDE 7.5 MG/ML
INJECTION, SOLUTION EPIDURAL; RETROBULBAR
Status: COMPLETED | OUTPATIENT
Start: 2020-12-18 | End: 2020-12-18

## 2020-12-18 RX ORDER — FLUMAZENIL 0.1 MG/ML
0.2 INJECTION INTRAVENOUS AS NEEDED
Status: DISCONTINUED | OUTPATIENT
Start: 2020-12-18 | End: 2020-12-18 | Stop reason: HOSPADM

## 2020-12-18 RX ORDER — SODIUM CHLORIDE 0.9 % (FLUSH) 0.9 %
3-10 SYRINGE (ML) INJECTION AS NEEDED
Status: DISCONTINUED | OUTPATIENT
Start: 2020-12-18 | End: 2020-12-18 | Stop reason: HOSPADM

## 2020-12-18 RX ORDER — MELOXICAM 15 MG/1
15 TABLET ORAL ONCE
Status: DISCONTINUED | OUTPATIENT
Start: 2020-12-18 | End: 2020-12-18

## 2020-12-18 RX ADMIN — PROPOFOL 30 MG: 10 INJECTION, EMULSION INTRAVENOUS at 09:31

## 2020-12-18 RX ADMIN — PROPOFOL 120 MCG/KG/MIN: 10 INJECTION, EMULSION INTRAVENOUS at 09:36

## 2020-12-18 RX ADMIN — FENTANYL CITRATE 50 MCG: 50 INJECTION INTRAMUSCULAR; INTRAVENOUS at 10:36

## 2020-12-18 RX ADMIN — BUPIVACAINE HYDROCHLORIDE 1.6 ML: 7.5 INJECTION, SOLUTION EPIDURAL; RETROBULBAR at 09:35

## 2020-12-18 RX ADMIN — FENTANYL CITRATE 50 MCG: 50 INJECTION INTRAMUSCULAR; INTRAVENOUS at 09:35

## 2020-12-18 RX ADMIN — LIDOCAINE HYDROCHLORIDE 60 MG: 20 INJECTION, SOLUTION INFILTRATION; PERINEURAL at 09:31

## 2020-12-18 RX ADMIN — PREGABALIN 150 MG: 75 CAPSULE ORAL at 07:45

## 2020-12-18 RX ADMIN — FAMOTIDINE 20 MG: 10 INJECTION INTRAVENOUS at 08:31

## 2020-12-18 RX ADMIN — ONDANSETRON 4 MG: 2 INJECTION INTRAMUSCULAR; INTRAVENOUS at 12:43

## 2020-12-18 RX ADMIN — MELOXICAM 15 MG: 15 TABLET ORAL at 07:46

## 2020-12-18 RX ADMIN — SODIUM CHLORIDE, POTASSIUM CHLORIDE, SODIUM LACTATE AND CALCIUM CHLORIDE 9 ML/HR: 600; 310; 30; 20 INJECTION, SOLUTION INTRAVENOUS at 08:32

## 2020-12-18 RX ADMIN — VANCOMYCIN HYDROCHLORIDE 1500 MG: 10 INJECTION, POWDER, LYOPHILIZED, FOR SOLUTION INTRAVENOUS at 07:46

## 2020-12-18 RX ADMIN — EPHEDRINE SULFATE 10 MG: 50 INJECTION INTRAVENOUS at 10:06

## 2020-12-18 RX ADMIN — SODIUM CHLORIDE, POTASSIUM CHLORIDE, SODIUM LACTATE AND CALCIUM CHLORIDE: 600; 310; 30; 20 INJECTION, SOLUTION INTRAVENOUS at 10:51

## 2020-12-18 RX ADMIN — MIDAZOLAM 1 MG: 1 INJECTION INTRAMUSCULAR; INTRAVENOUS at 08:31

## 2020-12-18 RX ADMIN — EPHEDRINE SULFATE 10 MG: 50 INJECTION INTRAVENOUS at 10:16

## 2020-12-18 RX ADMIN — ONDANSETRON HYDROCHLORIDE 4 MG: 2 SOLUTION INTRAMUSCULAR; INTRAVENOUS at 09:50

## 2020-12-18 RX ADMIN — EPHEDRINE SULFATE 10 MG: 50 INJECTION INTRAVENOUS at 09:58

## 2020-12-18 RX ADMIN — KETOROLAC TROMETHAMINE 30 MG: 30 INJECTION, SOLUTION INTRAMUSCULAR at 12:16

## 2020-12-18 RX ADMIN — DEXAMETHASONE SODIUM PHOSPHATE 8 MG: 4 INJECTION INTRA-ARTICULAR; INTRALESIONAL; INTRAMUSCULAR; INTRAVENOUS; SOFT TISSUE at 09:50

## 2020-12-18 RX ADMIN — CEFAZOLIN SODIUM 2 G: 2 INJECTION, SOLUTION INTRAVENOUS at 09:45

## 2020-12-18 RX ADMIN — ACETAMINOPHEN 1000 MG: 10 INJECTION, SOLUTION INTRAVENOUS at 09:40

## 2020-12-18 RX ADMIN — TRANEXAMIC ACID 1000 MG: 100 INJECTION, SOLUTION INTRAVENOUS at 10:36

## 2020-12-18 NOTE — OP NOTE
Name: Namrata Luz  YOB: 1955    DATE OF SURGERY: 12/18/2020    PREOPERATIVE DIAGNOSIS: Right knee end-stage osteoarthritis    POSTOPERATIVE DIAGNOSIS: Right knee end-stage osteoarthritis    PROCEDURE PERFORMED: Right total knee replacement    SURGEON: Emanuel Jack M.D.    ASSISTANT: KASEY POWER    IMPLANTS: [employer]:     Implant Name Type Inv. Item Serial No.  Lot No. LRB No. Used Action   CMT BONE PALACOS R HI/VISC 1X40 - [id number] Implant CMT BONE PALACOS R HI/VISC 1X40  [facility] [id number] Right 1 Implanted   SUT CONTRL TISS STRATAFIX SPIRAL MNCRYL UD 3/0 PLS 30CM - [id number] Implant SUT CONTRL TISS STRATAFIX SPIRAL MNCRYL UD 3/0 PLS 30CM  [employer]  DIV OF J AND J [id number] Right 1 Implanted   SUT CONTRL TISS STRATAFIX SYMM PDS PLUS CARA CT-1 60CM - [id number] Implant SUT CONTRL TISS STRATAFIX SYMM PDS PLUS CARA CT-1 60CM  [employer]  DIV OF J AND J [id number] Right 1 Implanted   PAT GEN2 BICONVEX [id number] - [id number] Implant PAT GEN2 BICONVEX [id number]  [employer] [id number] Right 1 Implanted   BASE TIB/KN GEN2 NONPOR TI SZ3 RT - [id number] Implant BASE TIB/KN GEN2 NONPOR TI SZ3 RT  [employer] [id number] Right 1 Implanted   COMP FEM LEGION OXINIUM CR NRW SZ5N RT - [id number] Implant COMP FEM LEGION OXINIUM CR NRW SZ5N RT  [employer] [id number] Right 1 Implanted   INSRT ART LEGION CR HF XLPE SZ3TO4 9MM - [id number] Implant INSRT ART LEGION CR HF XLPE SZ3TO4 9MM  [employer] [id number] Right 1 Implanted       Estimated Blood Loss: 200cc  Specimens : none  Complications: none    DESCRIPTION OF PROCEDURE: The patient was taken to the operating room and placed in the supine position. A sequential compression device was carefully placed on the non-operative leg. Preoperative antibiotics were administered. Surgical time out was performed. After adequate induction of anesthesia, the leg was prepped and draped in the usual sterile fashion,  exsanguinated with an Esmarch bandage and the tourniquet inflated to 250 mmHg. A midline incision was performed followed by a medial parapatellar arthrotomy. The patella was subluxed laterally.  A portion of the fat pad, ACL, and anterior horns of the meniscus were excised. The drill hole was placed in the distal femur and the canal was the irrigated and suctioned. The IM guerrero was placed and a 5 degree distal valgus cut was performed on the femur. The femur was then sized with a sizing guide. The femoral cutting block was placed and all femoral cuts were performed. The proximal tibia was exposed. We used the extramedullary tibial cutting guide set for removal of 9mm of bone off the high side. The tibial cut was performed. The posterior horns of the menisci were excised. The posterior osteophytes were removed. Flexion extension blocks were then used to balance the knee. The tibial cut surface was then sized with the sizing templates and the tibial and femoral trial were then placed. The knee was placed in full extension and then the tibial tray rotation was then matched to the femoral rotation and marked.    Attention was then placed to the patella. The patella was noted to track centrally through range of motion. The patella was then sized with the trials. The thickness of the patella was then measured. The patella was resurfaced and the surrounding osteophytes were removed. The preoperative thickness was reproduced. The patella tracked centrally through range of motion.   At this point all trial components were removed, the knee was copiously irrigated with pulsed lavage, and the knee was injected with anesthetic cocktail solution. The cut surfaces were then dried with clean lap sponges, and the components were cemented tibia, followed by femur, then patella. The knee was held in full extension and all excess cement was removed. The knee was held still until the cement had completely hardened. We then placed the  trial polyethylene spacer which resulted in full extension and excellent flexion-extension balance. We placed the final polyethylene spacer.   The knee was then copiously irrigated. The tourniquet was then released. There was excellent hemostasis. We placed a one-eighth inch Hemovac drain. We closed the knee in multiple layers in standard fashion. Sterile dressing were applied. At the end of the case, the sponge and needle counts were reported as being correct. There were no known complications. The patient was then transported to the recovery room.      Emanuel Jack M.D.

## 2020-12-18 NOTE — PLAN OF CARE
Goal Outcome Evaluation:  Plan of Care Reviewed With: patient     Outcome Summary: Pt is POD 0 R TKA. Pt reports IND at baseline, no steps to enter, all needs met on first floor, and lives with . Pt demonstrates impaired strength, ROM and functional mobility that should be address with skilled PT. She required min A x2 for transfers and to amb 5' to the chair due to BLE numbness. Anticipate dc home with assist and HH.    Patient was intermittently wearing a face mask during this therapy encounter. Therapist used appropriate personal protective equipment including eye protection, mask, and gloves.  Mask used was standard procedure mask. Appropriate PPE was worn during the entire therapy session. Hand hygiene was completed before and after therapy session. Patient is not in enhanced droplet precautions.

## 2020-12-18 NOTE — PLAN OF CARE
Goal Outcome Evaluation:  Plan of Care Reviewed With: patient      Pt ambulating standby assist with walker, voiding per BRP, VSS, dc home

## 2020-12-18 NOTE — THERAPY EVALUATION
Patient Name: Namrata Luz  : 1955    MRN: [id number]                              Today's Date: 2020       Admit Date: 2020    Visit Dx:     ICD-10-CM ICD-9-CM   1. S/P TKR (total knee replacement), right  Z96.651 V43.65   2. Primary osteoarthritis of right knee  M17.11 715.16     Patient Active Problem List   Diagnosis   • Abnormal liver function tests   • Asthma   • Mixed anxiety depressive disorder   • Hyperlipidemia   • Hypertension   • Hypothyroidism   • Lung nodule, solitary   • Psoriasis   • Vitamin D deficiency   • Right medial knee pain   • Type 2 diabetes mellitus without complication (CMS/HCC)   • Chronic pain of left knee   • CAMEJO (nonalcoholic steatohepatitis)   • Chronic pain of right knee   • Primary osteoarthritis of right knee     Past Medical History:   Diagnosis Date   • Allergic rhinitis    • Anxiety    • Arthritis    • Asthma    • Diabetes mellitus (CMS/HCC)    • Disease of thyroid gland    • Endometriosis    • Fractures    • Hyperlipidemia    • Hypertension     ON TOPROL for a diagnosis of MVP but was later determined no MVp and MD decided not to stop since she has been on a while. lisinopril is prescribed for diabetes   • Limited joint range of motion     LT KNEE   • CAMEJO (nonalcoholic steatohepatitis)    • Prediabetes    • Primary osteoarthritis of right knee 10/6/2020   • Psoriasis    • Renal insufficiency    • Sleep apnea     USES C-PAP   • Tachycardia    • Vitamin D deficiency      Past Surgical History:   Procedure Laterality Date   • APPENDECTOMY     • BACK SURGERY     • CATARACT EXTRACTION, BILATERAL Bilateral    • CHOLECYSTECTOMY  'S   • COLONOSCOPY     • EYE SURGERY     • HYSTERECTOMY     • JOINT REPLACEMENT     • KNEE ARTHROPLASTY UNICOMPARTMENTAL Left 2018    Procedure: KNEE ARTHROPLASTY UNICOMPARTMENTAL;  Surgeon: Emanuel Jack MD;  Location: [facility];  Service: Orthopedics   • LUMBAR DISCECTOMY     • TIBIA FASCIOTOMY Left  2006   • TUBAL ABDOMINAL LIGATION Bilateral 1981     General Information     Row Name 12/18/20 1326          Physical Therapy Time and Intention    Document Type  evaluation  -CF     Mode of Treatment  physical therapy  -CF     Row Name 12/18/20 1326          General Information    Patient Profile Reviewed  yes  -CF     Prior Level of Function  independent:  -CF     Existing Precautions/Restrictions  fall  -CF     Barriers to Rehab  none identified  -CF     Row Name 12/18/20 1326          Living Environment    Lives With  spouse  -CF     Row Name 12/18/20 1326          Home Main Entrance    Number of Stairs, Main Entrance  none  -CF     Row Name 12/18/20 1326          Stairs Within Home, Primary    Number of Stairs, Within Home, Primary  none  -CF     Row Name 12/18/20 1326          Cognition    Orientation Status (Cognition)  oriented x 4  -CF     Row Name 12/18/20 1326          Safety Issues, Functional Mobility    Impairments Affecting Function (Mobility)  balance;range of motion (ROM);endurance/activity tolerance;strength  -CF       User Key  (r) = Recorded By, (t) = Taken By, (c) = Cosigned By    Initials Name Provider Type    CF Dionne Mondragon PT Physical Therapist        Mobility     Row Name 12/18/20 1326          Bed Mobility    Bed Mobility  supine-sit  -CF     Supine-Sit [place] (Bed Mobility)  supervision  -CF     Assistive Device (Bed Mobility)  bed rails;head of bed elevated  -[facility] Name 12/18/20 1326          Sit-Stand Transfer    Sit-Stand [place] (Transfers)  verbal cues;minimum assist (75% patient effort);2 person assist  -CF     Assistive Device (Sit-Stand Transfers)  walker, front-wheeled  -CF     [facility] Name 12/18/20 1326          Gait/Stairs (Locomotion)    [place] Level (Gait)  verbal cues;2 person assist;minimum assist (75% patient effort)  -CF     Assistive Device (Gait)  walker, front-wheeled  -CF     Distance in Feet (Gait)  5' to chair  -CF     Deviations/Abnormal  Patterns (Gait)  jovanna decreased;gait speed decreased;antalgic  -CF     Bilateral Gait Deviations  knee buckling, bilateral;forward flexed posture  -CF     Comment (Gait/Stairs)  slight B knee buckling due to B LE numbness, unsafe to ambulate further distance. assist x2 for safety  -     Row Name 12/18/20 1326          Mobility    Extremity Weight-bearing Status  right lower extremity  -CF     Right Lower Extremity (Weight-bearing Status)  weight-bearing as tolerated (WBAT)  -       User Key  (r) = Recorded By, (t) = Taken By, (c) = Cosigned By    Initials Name Provider Type     Dionne Mondragon PT Physical Therapist        Obj/Interventions     Row Name 12/18/20 1334          Range of Motion Comprehensive    General Range of Motion  bilateral lower extremity ROM WFL  -     Comment, General Range of Motion  R approx 0-90  -     Row Name 12/18/20 1334          Strength Comprehensive (MMT)    Comment, General Manual Muscle Testing (MMT) Assessment  BLE WFL  -     Row Name 12/18/20 1334          Motor Skills    Therapeutic Exercise  other (see comments) tka exercises x10  -     Row Name 12/18/20 1334          Sensory Assessment (Somatosensory)    Sensory Assessment (Somatosensory)  other (see comments) R numbness reported  -       User Key  (r) = Recorded By, (t) = Taken By, (c) = Cosigned By    Initials Name Provider Type     Dionne Mondragon PT Physical Therapist        Goals/Plan     Row Name 12/18/20 1335          Bed Mobility Goal 1 (PT)    Activity/Assistive Device (Bed Mobility Goal 1, PT)  bed mobility activities, all  -CF     [place] Level/Cues Needed (Bed Mobility Goal 1, PT)  modified independence  -CF     Time Frame (Bed Mobility Goal 1, PT)  3 days  -     Row Name 12/18/20 1335          Transfer Goal 1 (PT)    Activity/Assistive Device (Transfer Goal 1, PT)  sit-to-stand/stand-to-sit;bed-to-chair/chair-to-bed;walker, rolling  -CF     [place] Level/Cues Needed (Transfer  Goal 1, PT)  modified independence  -CF     Time Frame (Transfer Goal 1, PT)  3 days  -     Row Name 12/18/20 6534          Gait Training Goal 1 (PT)    Activity/Assistive Device (Gait Training Goal 1, PT)  gait (walking locomotion);walker, rolling  -CF     [place] Level (Gait Training Goal 1, PT)  modified independence  -CF     Distance (Gait Training Goal 1, PT)  100'  -CF     Time Frame (Gait Training Goal 1, PT)  3 days  -     Row Name 12/18/20 3179          ROM Goal 1 (PT)    ROM Goal 1 (PT)  R knee 0-90  -CF     Time Frame (ROM Goal 1, PT)  3 days  -CF       User Key  (r) = Recorded By, (t) = Taken By, (c) = Cosigned By    Initials Name Provider Type    CF Dionne Mondragon, PT Physical Therapist        Clinical Impression     Row Name 12/18/20 7496          Pain    Additional Documentation  Pain Scale: Numbers Pre/Post-Treatment (Group)  -CF     Row Name 12/18/20 2272          Pain Scale: Numbers Pre/Post-Treatment    Pretreatment Pain Rating  0/10 - no pain  -CF     Posttreatment Pain Rating  0/10 - no pain  -     Row Name 12/18/20 1615          Plan of Care Review    Plan of Care Reviewed With  patient  -CF     Outcome Summary  Pt is POD 0 R TKA. Pt reports IND at baseline, no steps to enter, all needs met on first floor, and lives with . Pt demonstrates impaired strength, ROM and functional mobility that should be address with skilled PT. She required min A x2 for transfers and to amb 5' to the chair due to BLE numbness. Anticipate dc home with assist and HH.  -     Row Name 12/18/20 7432          Therapy Assessment/Plan (PT)    Rehab Potential (PT)  good, to achieve stated therapy goals  -     Criteria for Skilled Interventions Met (PT)  yes  -CF     Predicted Duration of Therapy Intervention (PT)  3 days  -     Row Name 12/18/20 1616          Vital Signs    O2 Delivery Pre Treatment  room air  -[facility] Name 12/18/20 1614          Positioning and Restraints    Pre-Treatment  Position  in bed  -CF     Post Treatment Position  chair  -CF     In Chair  reclined;call light within reach;encouraged to call for assist;exit alarm on;RLE elevated  -CF       User Key  (r) = Recorded By, (t) = Taken By, (c) = Cosigned By    Initials Name Provider Type    Dionne Noyoal PT Physical Therapist        Outcome Measures     Row Name 12/18/20 1336          How much help from another person do you currently need...    Turning from your back to your side while in flat bed without using bedrails?  3  -CF     Moving from lying on back to sitting on the side of a flat bed without bedrails?  3  -CF     Moving to and from a bed to a chair (including a wheelchair)?  3  -CF     Standing up from a chair using your arms (e.g., wheelchair, bedside chair)?  3  -CF     Climbing 3-5 steps with a railing?  2  -CF     To walk in hospital room?  3  -CF     AM-PAC 6 Clicks Score (PT)  17  -CF     Row Name 12/18/20 1336          Functional Assessment    Outcome Measure Options  AM-PAC 6 Clicks Basic Mobility (PT)  -CF       User Key  (r) = Recorded By, (t) = Taken By, (c) = Cosigned By    Initials Name Provider Type    Dionne Noyola PT Physical Therapist        Physical Therapy Education                 Title: PT OT SLP Therapies (Done)     Topic: Physical Therapy (Done)     Point: Mobility training (Done)     Learning Progress Summary           Patient Acceptance, E, VU,DU by CF at 12/18/2020 1337                   Point: Home exercise program (Done)     Learning Progress Summary           Patient Acceptance, E, VU,DU by CF at 12/18/2020 1337                   Point: Body mechanics (Done)     Learning Progress Summary           Patient Acceptance, E, VU,DU by CF at 12/18/2020 1337                   Point: Precautions (Done)     Learning Progress Summary           Patient Acceptance, E, VU,DU by CF at 12/18/2020 1337                               User Key     Initials Effective Dates Name Provider Type  Discipline    CF 09/02/20 -  Dionne Mondragon PT Physical Therapist PT              PT Recommendation and Plan  Planned Therapy Interventions (PT): balance training, bed mobility training, gait training, home exercise program, stair training, ROM (range of motion), strengthening, patient/family education, stretching, transfer training  Plan of Care Reviewed With: patient  Outcome Summary: Pt is POD 0 R TKA. Pt reports IND at baseline, no steps to enter, all needs met on first floor, and lives with . Pt demonstrates impaired strength, ROM and functional mobility that should be address with skilled PT. She required min A x2 for transfers and to amb 5' to the chair due to BLE numbness. Anticipate dc home with assist and HH.     Time Calculation:   PT Charges     Row Name 12/18/20 1325             Time Calculation    Start Time  1323  -CF      Stop Time  1350  -      Time Calculation (min)  27 min  -      PT Received On  12/18/20  -CF      PT - Next Appointment  12/19/20  -      PT Goal Re-Cert Due Date  12/22/20  -         Time Calculation- PT    Total Timed Code Minutes- PT  23 minute(s)  -        User Key  (r) = Recorded By, (t) = Taken By, (c) = Cosigned By    Initials Name Provider Type    CF Dionne Mondragon, JULIA Physical Therapist        Therapy Charges for Today     Code Description Service Date Service Provider Modifiers Qty    34109947727 HC PT EVAL LOW COMPLEXITY 2 12/18/2020 Dionne Mondragon, PT GP 1    78374809771 HC PT THER PROC EA 15 MIN 12/18/2020 Dionne Mondragon, PT GP 1    93333561280 HC PT THERAPEUTIC ACT EA 15 MIN 12/18/2020 Dionne Mondragon, PT GP 1          PT G-Codes  Outcome Measure Options: AM-PAC 6 Clicks Basic Mobility (PT)  AM-PAC 6 Clicks Score (PT): 17    Dionne Mondragon PT  12/18/2020

## 2020-12-18 NOTE — PROGRESS NOTES
Continued Stay Note  [facility]     Patient Name: Namrata uLz  MRN: [id number]  Today's Date: 12/18/2020    Admit Date: 12/18/2020    Discharge Plan     Row Name 12/18/20 1446       Plan    Plan  [facility]    Patient/Family in Agreement with Plan  yes    Plan Comments  Spoke with pt, verified correct information on facesheet and explained the role of CCP. Pt would like to d/c home with [facility], referral sent in Epic to [facility]. Plan will be to d/c home with [facility] and family support. No other needs identified.        Discharge Codes    No documentation.       Expected Discharge Date and Time     Expected Discharge Date Expected Discharge Time    Dec 18, 2020             Sabine Zhou RN

## 2020-12-18 NOTE — DISCHARGE PLACEMENT REQUEST
"Namrata Boo (65 y.o. Female)     Date of Birth Social Security Number Address Home Phone MRN    1955  [facility] [place] [id number] [phone] [id number]    Rastafarian Marital Status          Mandaen        Admission Date Admission Type Admitting Provider Attending Provider Department, Room/Bed    12/18/20 Elective Emanuel Jack MD Brown, Reid B, MD [address], P798/1    Discharge Date Discharge Disposition Discharge Destination         [place] [facility]              Attending Provider: Emanuel Jack MD    Allergies: Morphine And Related, Penicillins, Morphine    Isolation: None   Infection: None   Code Status: Prior    Ht: 172.7 cm (68\")   Wt: 91.8 kg (202 lb 6 oz)    Admission Cmt: None   Principal Problem: Primary osteoarthritis of right knee [M17.11] More...                 Active Insurance as of 12/18/2020     Primary Coverage     Payor Plan Insurance Group Employer/Plan Group    HUMANA HUMANA [id number]     Payor Plan Address Payor Plan Phone Number Payor Plan Fax Number Effective Dates    [address] [address] [phone]  1/1/2020 - None Entered    [facility] [phone]       Subscriber Name Subscriber Birth Date Member ID       NAMRATA BOO 1955 [id number]           Secondary Coverage     Payor Plan Insurance Group Employer/Plan Group    MEDICARE MEDICARE A ONLY      Payor Plan Address Payor Plan Phone Number Payor Plan Fax Number Effective Dates    [address] [phone]  12/1/2020 - None Entered    [place] [id number]       Subscriber Name Subscriber Birth Date Member ID       NAMRATA BOO 1955 [id number]                 Emergency Contacts      (Rel.) Home Phone Work Phone Mobile Phone    Mando Paul (Son) [phone] -- [phone]    Cristian James (Significant Other) -- -- [phone]          "

## 2020-12-18 NOTE — ANESTHESIA PROCEDURE NOTES
Spinal Block      Patient reassessed immediately prior to procedure    Patient location during procedure: OR  Start Time: 12/18/2020 9:30 AM  Stop Time: 12/18/2020 9:35 AM  Indication:at surgeon's request  Performed By  Anesthesiologist: Tylor Galan MD  Preanesthetic Checklist  Completed: patient identified, site marked, surgical consent, pre-op evaluation, timeout performed, IV checked, risks and benefits discussed and monitors and equipment checked  Spinal Block Prep:  Patient Position:sitting  Sterile Tech:cap, gloves, mask and sterile barriers  Prep:Betadine  Patient Monitoring:blood pressure monitoring, continuous pulse oximetry and EKG  Spinal Block Procedure  Approach:midline  Guidance:landmark technique and palpation technique  Needle Type:Joey  Needle Gauge:24 G  Placement of Spinal needle event:cerebrospinal fluid aspirated  Paresthesia: no  Fluid Appearance:clear  Medications: bupivacaine PF (MARCAINE) 0.75 % injection, 1.6 mL  Med Administered at 12/18/2020 9:35 AM   Post Assessment  Patient Tolerance:patient tolerated the procedure well with no apparent complications  Complications no

## 2020-12-18 NOTE — ANESTHESIA PREPROCEDURE EVALUATION
Anesthesia Evaluation     Patient summary reviewed and Nursing notes reviewed                Airway   Mallampati: III  TM distance: >3 FB  Neck ROM: full  Possible difficult intubation  Dental - normal exam     Pulmonary - normal exam   (+) asthma,sleep apnea on CPAP,   Cardiovascular - normal exam    (+) hypertension 2 medications or greater, dysrhythmias Tachycardia, hyperlipidemia,       Neuro/Psych  (+) psychiatric history Anxiety and Depression,     GI/Hepatic/Renal/Endo    (+) obesity,   hepatitis, liver disease fatty liver disease history of elevated LFT, renal disease CRI, diabetes mellitus type 2, thyroid problem hypothyroidism    ROS Comment: CAMEJO    Musculoskeletal     (+) chronic pain,   Abdominal   (+) obese,    Substance History      OB/GYN          Other                        Anesthesia Plan    ASA 3     spinal       Anesthetic plan, all risks, benefits, and alternatives have been provided, discussed and informed consent has been obtained with: patient.    Plan discussed with CRNA.

## 2020-12-18 NOTE — ANESTHESIA POSTPROCEDURE EVALUATION
Patient: Namrata Luz    Procedure Summary     Date: 12/18/20 Room / Location: [facility] OR [address] [place] [address] MAIN OR    Anesthesia Start: 0925 Anesthesia Stop: 1124    Procedure: TOTAL KNEE ARTHROPLASTY (Right Knee) Diagnosis:       Primary osteoarthritis of right knee      (Primary osteoarthritis of right knee [M17.11])    Surgeon: Emanuel Jack MD Provider: Tylor Galan MD    Anesthesia Type: spinal ASA Status: 3          Anesthesia Type: spinal    Vitals  Vitals Value Taken Time   /59 12/18/20 1215   Temp 36.9 °C (98.5 °F) 12/18/20 1122   Pulse 65 12/18/20 1216   Resp 18 12/18/20 1215   SpO2 94 % 12/18/20 1219   Vitals shown include unvalidated device data.        Post Anesthesia Care and Evaluation    Patient location during evaluation: bedside  Patient participation: complete - patient participated  Level of consciousness: awake  Pain management: adequate  Airway patency: patent  Anesthetic complications: No anesthetic complications    Cardiovascular status: acceptable  Respiratory status: acceptable  Hydration status: acceptable

## 2020-12-19 NOTE — OUTREACH NOTE
Prep Survey      Responses   [facility] facility patient discharged from?  [place]   Is LACE score < 7 ?  Yes   Eligibility  [facility]   Date of Admission  12/18/20   Date of Discharge  12/18/20   Discharge Disposition  [facility]   Discharge diagnosis  TOTAL KNEE ARTHROPLASTY   Does the patient have one of the following disease processes/diagnoses(primary or secondary)?  Total Joint Replacement   Does the patient have Home health ordered?  Yes   What is the Home health agency?   [facility]   Is there a DME ordered?  No   Prep survey completed?  Yes          Herlinda Tobar RN

## 2020-12-21 ENCOUNTER — TELEPHONE (OUTPATIENT)
Dept: ORTHOPEDIC SURGERY | Facility: CLINIC | Age: 65
End: 2020-12-21

## 2020-12-21 ENCOUNTER — TRANSITIONAL CARE MANAGEMENT TELEPHONE ENCOUNTER (OUTPATIENT)
Dept: CALL CENTER | Facility: HOSPITAL | Age: 65
End: 2020-12-21

## 2020-12-21 NOTE — OUTREACH NOTE
Call Center TCM Note      Responses   [facility] patient discharged from?  [place]   Does the patient have one of the following disease processes/diagnoses(primary or secondary)?  Total Joint Replacement   Joint surgery performed?  Knee   TCM attempt successful?  Yes   Call start time  1707   Call end time  1712   Has the patient been back in either the hospital or Emergency Department since discharge?  No   Discharge diagnosis  TOTAL KNEE ARTHROPLASTY   Does the patient have all medications related to this admission filled (includes all antibiotics, pain medications, etc.)  Yes   Is the patient taking all medications as directed (includes completed medication regime)?  Yes   Is the patient able to teach back alternate methods of pain control?  Ice, Knee-elevation/no pillow under knee, Reposition, Correct alignment, Short, frequent activity   Does the patient have a follow up appointment with their surgeon?  No   What is preventing the patient from scheduling follow up appointments within 7 days of discharge?  Waiting on return call   Nursing Interventions  Advised patient to make appointment, Educated patient on importance of making appointment   Has the patient kept scheduled appointments due by today?  N/A   Comments  Patient declines PCP followup at this time.    What is the Home health agency?   [facility]   Has home health visited the patient within 72 hours of discharge?  Yes   Has the patient began therapy sessions (either in the home or as an out patient)?  Yes   Does the patient have a wound vac in place?  N/A   Has the patient fallen since discharge?  No   Did the patient receive a copy of their discharge instructions?  Yes   Nursing interventions  Reviewed instructions with patient   What is the patient's perception of their functional status since discharge?  Improving   Is the patient able to teach back signs and symptoms of infection?  Temp >100.4 for 24h or longer, Incisional drainage, Blisters  around incision, Increased swelling or redness around incision (not associated with surgical edema), Severe discomfort or pain, Changes in mobility, Shortness of breath or chest pain   Is the patient able to teach back how to prevent infection?  Check incision daily, Wash hands before and after touching incision, Keep incision covered if drainage, Shower only as directed by surgeon, Eat well-balanced diet, No tub baths, hot tub or swimming   Is the patient able to teach back signs and symptoms of DVT?  Redness in calf, Area hot to touch, Shortness of breath or chest pain, Swelling in calf, Severe pain in calf   Is the patient able to teach back home safety measures?  Ability to shower   Did the patient implement home safety suggestions from pre-surgery classes if attended?  Yes   If the patient is a current smoker, are they able to teach back resources for cessation?  Not a smoker   Is the patient/caregiver able to teach back the hierarchy of who to call/visit for symptoms/problems? PCP, Specialist, Home health nurse, Urgent Care, ED, 911  Yes   TCM call completed?  Yes          Allen Hayes RN    12/21/2020, 17:12 EST

## 2020-12-21 NOTE — TELEPHONE ENCOUNTER
Hub staff attempted to follow warm transfer process and was unsuccessful     Caller: TONYA BOO    Relationship to patient: SELF    Best call back number: [phone]    Patient is needing: TO SCHEDULE HER INITIAL POST-OP APPT WITH DR. CAMERON. DILIA TIRADO ON 12/18/20.

## 2020-12-21 NOTE — PROGRESS NOTES
Case Management Discharge Note      Final Note: Home with family/friend suppor t& [facility].         Selected Continued Care - Discharged on 12/18/2020 Admission date: 12/18/2020 - Discharge disposition: Home-Health Care Svc    Destination    No services have been selected for the patient.              Durable Medical Equipment    No services have been selected for the patient.              Dialysis/Infusion    No services have been selected for the patient.              Home Medical Care Coordination complete    Service Provider Selected Services Address Phone Fax Patient Preferred    [facility] [place]  [facility] [address] [place] [id number] [phone] --          Therapy    No services have been selected for the patient.              Community Resources    No services have been selected for the patient.                       Final Discharge Disposition Code: 06 - home with home health care

## 2020-12-28 DIAGNOSIS — Z96.651 S/P TKR (TOTAL KNEE REPLACEMENT), RIGHT: ICD-10-CM

## 2020-12-28 DIAGNOSIS — B37.31 VAGINAL CANDIDIASIS: ICD-10-CM

## 2020-12-28 DIAGNOSIS — B37.31 VAGINAL CANDIDIASIS: Primary | ICD-10-CM

## 2020-12-28 RX ORDER — FLUCONAZOLE 150 MG/1
150 TABLET ORAL ONCE
Qty: 2 TABLET | Refills: 0 | Status: SHIPPED | OUTPATIENT
Start: 2020-12-28 | End: 2020-12-28

## 2020-12-28 RX ORDER — HYDROCODONE BITARTRATE AND ACETAMINOPHEN 7.5; 325 MG/1; MG/1
TABLET ORAL
Qty: 60 TABLET | Refills: 0 | Status: SHIPPED | OUTPATIENT
Start: 2020-12-28 | End: 2021-10-04

## 2020-12-28 RX ORDER — FLUCONAZOLE 150 MG/1
150 TABLET ORAL ONCE
Qty: 2 TABLET | Refills: 0 | Status: SHIPPED | OUTPATIENT
Start: 2020-12-28 | End: 2020-12-28 | Stop reason: SDUPTHER

## 2020-12-28 RX ORDER — FLUCONAZOLE 150 MG/1
150 TABLET ORAL ONCE
Qty: 2 TABLET | Refills: 0 | Status: CANCELLED | OUTPATIENT
Start: 2020-12-28 | End: 2020-12-28

## 2020-12-28 NOTE — TELEPHONE ENCOUNTER
Patient had RTKA on 12/18 and is having painful spasms in her right leg at night making it hard for her to sleep. She wants to know if there is anything she can take to help with that?

## 2021-01-04 ENCOUNTER — HOSPITAL ENCOUNTER (OUTPATIENT)
Dept: PHYSICAL THERAPY | Facility: HOSPITAL | Age: 66
Setting detail: THERAPIES SERIES
Discharge: HOME | End: 2021-01-04

## 2021-01-04 DIAGNOSIS — Z47.89 ORTHOPEDIC AFTERCARE: ICD-10-CM

## 2021-01-04 DIAGNOSIS — R26.2 DIFFICULTY WALKING: ICD-10-CM

## 2021-01-04 DIAGNOSIS — M25.561 CHRONIC PAIN OF RIGHT KNEE: Primary | ICD-10-CM

## 2021-01-04 DIAGNOSIS — G89.29 CHRONIC PAIN OF RIGHT KNEE: Primary | ICD-10-CM

## 2021-01-04 PROCEDURE — 97110 THERAPEUTIC EXERCISES: CPT

## 2021-01-04 PROCEDURE — 97161 PT EVAL LOW COMPLEX 20 MIN: CPT

## 2021-01-04 NOTE — THERAPY EVALUATION
Outpatient Physical Therapy Ortho Initial Evaluation  [facility]     Patient Name: Namrata Luz  : 1955  MRN: [id number]  Today's Date: 2021      Visit Date: 2021    Patient Active Problem List   Diagnosis   • Abnormal liver function tests   • Asthma   • Mixed anxiety depressive disorder   • Hyperlipidemia   • Hypertension   • Hypothyroidism   • Lung nodule, solitary   • Psoriasis   • Vitamin D deficiency   • Right medial knee pain   • Type 2 diabetes mellitus without complication (CMS/HCC)   • Chronic pain of left knee   • CAMEJO (nonalcoholic steatohepatitis)   • Chronic pain of right knee   • Primary osteoarthritis of right knee        Past Medical History:   Diagnosis Date   • Allergic rhinitis    • Anxiety    • Arthritis    • Asthma    • Diabetes mellitus (CMS/HCC)    • Disease of thyroid gland    • Endometriosis    • Fractures    • Hyperlipidemia    • Hypertension     ON TOPROL for a diagnosis of MVP but was later determined no MVp and MD decided not to stop since she has been on a while. lisinopril is prescribed for diabetes   • Limited joint range of motion     LT KNEE   • CAMEJO (nonalcoholic steatohepatitis)    • Prediabetes    • Primary osteoarthritis of right knee 10/6/2020   • Psoriasis    • Renal insufficiency    • Sleep apnea     USES C-PAP   • Tachycardia    • Vitamin D deficiency         Past Surgical History:   Procedure Laterality Date   • APPENDECTOMY     • BACK SURGERY     • CATARACT EXTRACTION, BILATERAL Bilateral    • CHOLECYSTECTOMY  'S   • COLONOSCOPY     • EYE SURGERY     • HYSTERECTOMY     • JOINT REPLACEMENT     • KNEE ARTHROPLASTY UNICOMPARTMENTAL Left 2018    Procedure: KNEE ARTHROPLASTY UNICOMPARTMENTAL;  Surgeon: Emanuel Jack MD;  Location: [facility];  Service: Orthopedics   • LUMBAR DISCECTOMY     • TIBIA FASCIOTOMY Left    • TOTAL KNEE ARTHROPLASTY Right 2020    Procedure: TOTAL KNEE ARTHROPLASTY;  Surgeon: Guero  Emanuel MARTIN MD;  Location: [facility] MAIN OR;  Service: Orthopedics;  Laterality: Right;   • TUBAL ABDOMINAL LIGATION Bilateral 1981       Visit Dx:     ICD-10-CM ICD-9-CM   1. Chronic pain of right knee  M25.561 719.46    G89.29 338.29   2. Orthopedic aftercare  Z47.89 V54.9   3. Difficulty walking  R26.2 719.7         Patient History     Row Name 01/04/21 1200             History    Chief Complaint  Joint stiffness;Muscle tenderness;Muscle weakness;Pain  -RS      Type of Pain  Knee pain  -RS      Date Current Problem(s) Began  12/18/20  -RS      Brief Description of Current Complaint  The pt is a 64 yo female who presents s/p R TKa on 12/18. She got 102 degree bend with home health. Using RW in community and nothing at home. She had HH. She is getting in and out of the tub, step to on stairs. 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  No  -RS      What clinical tests have you had for this problem?  X-ray  -RS      Are you or can you be pregnant  No  -RS         Pain     Pain Location  Knee  -RS      Pain at Present  -- mild to moderate  -RS      Is your sleep disturbed?  Yes  -RS         Fall Risk Assessment    Any falls in the past year:  -- fell about 1 year ago  -RS      Other factors that contributed to the fall:  R knee gave out  -RS         Services    Prior Rehab/Home Health Experiences  Yes  -RS      Are you currently receiving Home Health services  No  -RS      Do you plan to receive Home Health services in the near future  No  -RS         Daily Activities    Primary Language  English  -RS      Are you able to read  Yes  -RS      Are you able to write  Yes  -RS      How does patient learn  best?  Demonstration  -RS      Pt Participated in POC and Goals  Yes  -RS         Safety    Are you being hurt, hit, or frightened by anyone at home or in your life?  No  -RS      Are you being neglected by a caregiver  No  -RS      Have you had any of the following issues with  Anxiety  -RS        User Key  (r) = Recorded By, (t) = Taken By, (c) = Cosigned By    Initials Name Provider Type    RS Rebecca Garcia PT Physical Therapist          PT Ortho     Row Name 01/04/21 1200       Posture/Observations    Posture/Observations Comments  staples R knee, healing, no significant redness, mild swelling  -RS       Myotomal Screen- Lower Quarter Clearing    Hip flexion (L2)  Right:;4- (Good -);Left:;4+ (Good +)  -RS    Knee extension (L3)  Right:;4- (Good -);Left:;4+ (Good +)  -RS    Ankle DF (L4)  Right:;4 (Good);Left:;4+ (Good +)  -RS    Ankle PF (S1)  Right:;4- (Good -);Left:;4 (Good)  -RS    Knee flexion (S2)  Right:;4- (Good -);Left:;4 (Good)  -RS       General ROM    RT Lower Ext  Rt Knee Extension/Flexion  -RS    LT Lower Ext  Lt Knee Extension/Flexion  -RS       Right Lower Ext    Rt Knee Extension/Flexion AROM  lacking 14-98  -RS    Rt Knee Extension/Flexion PROM  laccking 10-99  -RS       Left Lower Ext    Lt Knee Extension/Flexion AROM  lacking 4-135  -RS       Gait/Stairs (Locomotion)    Comment (Gait/Stairs)  pt ambulates with RW at start of eval, ambulates without AD with slight antalgic pattern, dec R heel strike, dec stance time  -RS      User Key  (r) = Recorded By, (t) = Taken By, (c) = Cosigned By    Initials Name Provider Type    RS Rebecca Garcia PT Physical Therapist                      Therapy Education  Education Details: Access code:[email or web address], Role of outpatient PT, POC, differential diagnosis, initial HEP, expectations, goals, anatomy.  Given: HEP, Symptoms/condition management, Edema management, Pain management  Program: New  How Provided: Verbal, Written, Demonstration  Provided to:  Patient  Level of Understanding: Verbalized, Demonstrated, Teach back education performed     PT OP Goals     Row Name 01/04/21 1300          PT Short Term Goals    STG Date to Achieve  01/25/21  -RS     STG 1  The pt will demonstrate IND and compliant with initial HEP focused on improved R knee mobility and quad strength.  -RS     STG 1 Progress  New  -RS     STG 2  The pt will demonstrate R knee  PROM to lacking no more than 5-110 for improved gait pattern.  -RS     STG 2 Progress  New  -RS        Long Term Goals    LTG 1  The pt will demonstrate R knee AROM to at least 0-120 for improved transitional position and stair navigation.  -RS     LTG 1 Progress  New  -RS     LTG 2  The pt will demonstrate IND with progressive HEP focused on IND condition management and return to PLOF.  -RS     LTG 2 Progress  New  -RS     LTG 3  The pt will score greater than or equal to 65% ability on the KOS to indicate improved perceived performance of ADLs.  -RS     LTG 3 Progress  New  -RS     LTG 4  The pt will resume reciprocal stair navigation for improved community and household navigation.  -RS     LTG 4 Progress  New  -RS     LTG 5  The pt will demonstrate RLE strength to at least 4+/5 for improved stair navigation and transitional position performance.  -RS     LTG 5 Progress  New  -RS     LTG 6  The pt will go on 30 min walk for exercise without pain greater than 2/10 to facilitate improved recreational activity performance.  -RS     LTG 6 Progress  New  -RS        Time Calculation    PT Goal Re-Cert Due Date  04/04/21  -RS       User Key  (r) = Recorded By, (t) = Taken By, (c) = Cosigned By    Initials Name Provider Type    Rebecca Viveros, PT Physical Therapist          PT Assessment/Plan     Row Name 01/04/21 1300          PT Assessment    Functional Limitations  Decreased safety during functional activities;Impaired gait;Limitation in home management;Limitations in community activities;Limitations in functional  capacity and performance;Performance in leisure activities;Performance in work activities  -RS     Impairments  Balance;Endurance;Gait;Range of motion;Posture;Poor body mechanics;Pain;Muscle strength;Joint mobility  -RS     Assessment Comments  Namrata Luz is a 65 y.o. female referred to physical therapy for R knee pain s/p TKA 12/18. She presents with a stable clinical presentation, along with no remarkable comorbidities and personal factors of previous L unicompartmental knee arthroplasty that may impact her progress in the plan of care. Pt presents today with dec R knee active and passive mobility, dec R knee strength, altered gait pattern, inc pain . her signs and symptoms are consistent with referring diagnosis. The previous impairments limit her ability to perform self care ADLs, ambulate without AD, navigate stairs, perform work duties. The pt self scores 45% ability on the KOS (0=full disabiltiy).Pt will benefit from skilled PT to address the previous impairments and return to PLOF.  -RS     Please refer to paper survey for additional self-reported information  Yes  -RS     Rehab Potential  Good  -RS     Patient/caregiver participated in establishment of treatment plan and goals  Yes  -RS     Patient would benefit from skilled therapy intervention  Yes  -RS        PT Plan    PT Frequency  2x/week  -RS     Predicted Duration of Therapy Intervention (PT)  8-10 weeks  -RS     Planned CPT's?  PT EVAL LOW COMPLEXITY: 37886;PT RE-EVAL: 32947;PT THER PROC EA 15 MIN: 40071;PT THER ACT EA 15 MIN: 43666;PT MANUAL THERAPY EA 15 MIN: 61103;PT NEUROMUSC RE-EDUCATION EA 15 MIN: 45814;PT GAIT TRAINING EA 15 MIN: 95245;PT SELF CARE/HOME MGMT/TRAIN EA 15: 17401;PT HOT OR COLD PACK TREAT MCARE;PT ELECTRICAL STIM UNATTEND: ;PT ELECTRICAL STIM ATTD EA 15 MIN: 90745  -RS     PT Plan Comments  Assess tolerance for initial HEP, warm up on Nustep, add SAQ/LAQ, HS curl, HL clamshell, bridge, gait train, balance train  -RS        User Key  (r) = Recorded By, (t) = Taken By, (c) = Cosigned By    Initials Name Provider Type    RS Rebecca Garcia, PT Physical Therapist            OP Exercises     Row Name 01/04/21 1200             Total Minutes    51151 - PT Therapeutic Exercise Minutes  12  -RS         Exercise 1    Exercise Name 1  QS towel  -RS      Cueing 1  Verbal;Demo  -RS      Reps 1  10  -RS      Time 1  10s  -RS         Exercise 2    Exercise Name 2  heel prop demo  -RS      Time 2  2 min  -RS         Exercise 3    Exercise Name 3  heel slide seated  -RS      Cueing 3  Verbal;Demo  -RS      Reps 3  10  -RS      Time 3  10s  -RS         Exercise 4    Exercise Name 4  HS stretch seated  -RS      Cueing 4  Verbal;Demo  -RS      Reps 4  3  -RS      Time 4  20s  -RS         Exercise 5    Exercise Name 5  gastroc stretch with belt  -RS      Cueing 5  Verbal;Demo  -RS      Reps 5  3  -RS      Time 5  20s  -RS         Exercise 6    Exercise Name 6  demo only heel raise  -RS      Cueing 6  Verbal;Demo  -RS      Reps 6  15-20  -RS        User Key  (r) = Recorded By, (t) = Taken By, (c) = Cosigned By    Initials Name Provider Type    RS Rebecca Garcia, PT Physical Therapist                        Outcome Measure Options: Knee Outcome Score- ADL  Knee Outcome Score  Knee Outcome Score Comments: 45% ability      Time Calculation:               PT G-Codes  Outcome Measure Options: Knee Outcome Score- ADL         Rebecca Garcia PT  1/4/2021

## 2021-01-05 ENCOUNTER — OFFICE VISIT (OUTPATIENT)
Dept: ORTHOPEDIC SURGERY | Facility: CLINIC | Age: 66
End: 2021-01-05

## 2021-01-05 VITALS — WEIGHT: 202 LBS | BODY MASS INDEX: 30.62 KG/M2 | TEMPERATURE: 96 F | HEIGHT: 68 IN

## 2021-01-05 DIAGNOSIS — E11.9 TYPE 2 DIABETES MELLITUS WITHOUT COMPLICATION, WITHOUT LONG-TERM CURRENT USE OF INSULIN (HCC): ICD-10-CM

## 2021-01-05 DIAGNOSIS — Z96.651 STATUS POST RIGHT KNEE REPLACEMENT: Primary | ICD-10-CM

## 2021-01-05 PROCEDURE — 99024 POSTOP FOLLOW-UP VISIT: CPT | Performed by: ORTHOPAEDIC SURGERY

## 2021-01-05 RX ORDER — EMPAGLIFLOZIN 25 MG/1
TABLET, FILM COATED ORAL
Qty: 30 TABLET | Refills: 0 | Status: SHIPPED | OUTPATIENT
Start: 2021-01-05 | End: 2021-02-12

## 2021-01-05 RX ORDER — FLUCONAZOLE 150 MG/1
TABLET ORAL
COMMUNITY
Start: 2020-12-28 | End: 2021-01-18 | Stop reason: SDUPTHER

## 2021-01-05 RX ORDER — DIAZEPAM 5 MG/1
5 TABLET ORAL NIGHTLY PRN
Qty: 14 TABLET | Refills: 0 | Status: SHIPPED | OUTPATIENT
Start: 2021-01-05 | End: 2021-06-04

## 2021-01-05 NOTE — PROGRESS NOTES
Namrata Luz : 1955 MRN: [id number] DATE: 2021    DIAGNOSIS: 2 week follow up right total knee      SUBJECTIVE:Patient returns today for 2 week follow up of right total knee replacement. Patient reports doing well with no unusual complaints. Appears to be progressing appropriately. Having some muscle spasm at night.    OBJECTIVE:   Exam:. The incision is healing appropriately. No sign of infection. Range of motion is progressing as expected. The calf is soft and nontender with a negative Homans sign.    ASSESSMENT: 2 week status post right knee replacement.    PLAN: 1) Staples removed and steri strips applied   2) Order given for PT   3) Discontinue BULMARO hose   4) Continue ice PRN   5) aspirin 81 mg orally every day for 1 month   6) Follow up in 6 weeks with repeat Xrays of right knee (3views)    Emanuel Jack MD  2021

## 2021-01-07 ENCOUNTER — APPOINTMENT (OUTPATIENT)
Dept: PHYSICAL THERAPY | Facility: HOSPITAL | Age: 66
End: 2021-01-07

## 2021-01-11 ENCOUNTER — HOSPITAL ENCOUNTER (OUTPATIENT)
Dept: PHYSICAL THERAPY | Facility: HOSPITAL | Age: 66
Setting detail: THERAPIES SERIES
Discharge: HOME | End: 2021-01-11

## 2021-01-11 DIAGNOSIS — Z47.89 ORTHOPEDIC AFTERCARE: ICD-10-CM

## 2021-01-11 DIAGNOSIS — G89.29 CHRONIC PAIN OF RIGHT KNEE: Primary | ICD-10-CM

## 2021-01-11 DIAGNOSIS — M25.561 CHRONIC PAIN OF RIGHT KNEE: Primary | ICD-10-CM

## 2021-01-11 DIAGNOSIS — R26.2 DIFFICULTY WALKING: ICD-10-CM

## 2021-01-11 PROCEDURE — 97140 MANUAL THERAPY 1/> REGIONS: CPT

## 2021-01-11 PROCEDURE — 97110 THERAPEUTIC EXERCISES: CPT

## 2021-01-11 NOTE — THERAPY TREATMENT NOTE
Outpatient Physical Therapy Ortho Treatment Note  [facility]     Patient Name: Namrata Luz  : 1955  MRN: [id number]  Today's Date: 2021      Visit Date: 2021    Visit Dx:    ICD-10-CM ICD-9-CM   1. Chronic pain of right knee  M25.561 719.46    G89.29 338.29   2. Orthopedic aftercare  Z47.89 V54.9   3. Difficulty walking  R26.2 719.7       Patient Active Problem List   Diagnosis   • Abnormal liver function tests   • Asthma   • Mixed anxiety depressive disorder   • Hyperlipidemia   • Hypertension   • Hypothyroidism   • Lung nodule, solitary   • Psoriasis   • Vitamin D deficiency   • Right medial knee pain   • Type 2 diabetes mellitus without complication (CMS/HCC)   • Chronic pain of left knee   • CAMEJO (nonalcoholic steatohepatitis)   • Chronic pain of right knee   • Primary osteoarthritis of right knee        Past Medical History:   Diagnosis Date   • Allergic rhinitis    • Anxiety    • Arthritis    • Asthma    • Diabetes mellitus (CMS/HCC)    • Disease of thyroid gland    • Endometriosis    • Fractures    • Hyperlipidemia    • Hypertension     ON TOPROL for a diagnosis of MVP but was later determined no MVp and MD decided not to stop since she has been on a while. lisinopril is prescribed for diabetes   • Limited joint range of motion     LT KNEE   • CAMEJO (nonalcoholic steatohepatitis)    • Prediabetes    • Primary osteoarthritis of right knee 10/6/2020   • Psoriasis    • Renal insufficiency    • Sleep apnea     USES C-PAP   • Tachycardia    • Vitamin D deficiency         Past Surgical History:   Procedure Laterality Date   • APPENDECTOMY     • BACK SURGERY     • CATARACT EXTRACTION, BILATERAL Bilateral    • CHOLECYSTECTOMY  'S   • COLONOSCOPY     • EYE SURGERY     • HYSTERECTOMY     • JOINT REPLACEMENT     • KNEE ARTHROPLASTY UNICOMPARTMENTAL Left 2018    Procedure: KNEE ARTHROPLASTY UNICOMPARTMENTAL;  Surgeon: Emanuel Jack MD;  Location: [facility] MAIN OR;   Service: Orthopedics   • LUMBAR DISCECTOMY  1982   • TIBIA FASCIOTOMY Left 2006   • TOTAL KNEE ARTHROPLASTY Right 12/18/2020    Procedure: TOTAL KNEE ARTHROPLASTY;  Surgeon: Emanuel Jack MD;  Location: [facility];  Service: Orthopedics;  Laterality: Right;   • TUBAL ABDOMINAL LIGATION Bilateral 1981                       PT Assessment/Plan     Row Name 01/11/21 1300          PT Assessment    Assessment Comments  Ms. Luz returns for first follow up reporting no changes in pain during the day, she continues to struggle with muscle cramping at night and is only able to sleep for a few hours at a time prior to HS cramping. Performed STM with good tolerance, noted increased TTP R HS and gastroc. Added SAQ, LAQ, gastroc stretch stairs, and nustep with good tolerance. She remains a good candidate for skilled PT. Advised to try taking pain medication prior to inc pain at night to see if helps her sleep better, MD prescribed valium which caused her to stay awake for over a day.  -RS        PT Plan    PT Plan Comments  Follow up regarding tolerance for STM and sleeping, focus on gait and ROM  -RS       User Key  (r) = Recorded By, (t) = Taken By, (c) = Cosigned By    Initials Name Provider Type    RS Rebecca Garcia, PT Physical Therapist            OP Exercises     Row Name 01/11/21 1200 01/11/21 1100          Subjective Comments    Subjective Comments  Still having a lot of trouble with sleeping and the hamstring cramping jean carlos night, only sleeping 3 hours at a time  -RS  --        Subjective Pain    Able to rate subjective pain?  yes  -RS  --        Total Minutes    27600 - PT Therapeutic Exercise Minutes  25  -RS  --     57469 - PT Manual Therapy Minutes  --  15  -RS        Exercise 1    Exercise Name 1  QS towel  -RS  --     Cueing 1  Verbal;Demo  -RS  --     Reps 1  10  -RS  --     Time 1  10s  -RS  --        Exercise 2    Exercise Name 2  heel prop demo  -RS  --     Time 2  2 min  -RS  --        Exercise 3     Exercise Name 3  heel slide seated  -RS  --     Cueing 3  Verbal;Demo  -RS  --     Reps 3  10  -RS  --     Time 3  10s  -RS  --        Exercise 4    Exercise Name 4  HS stretch seated  -RS  --     Cueing 4  Verbal;Demo  -RS  --     Reps 4  3  -RS  --     Time 4  20s  -RS  --        Exercise 5    Exercise Name 5  gastroc stretch step  -RS  --     Cueing 5  Verbal;Demo  -RS  --     Reps 5  3  -RS  --     Time 5  20s  -RS  --        Exercise 6    Exercise Name 6  nustep  -RS  --     Cueing 6  Verbal  -RS  --     Reps 6  --  -RS  --     Time 6  5 min  -RS  --     Additional Comments  LE only  -RS  --        Exercise 7    Exercise Name 7  SAQ  -RS  --     Cueing 7  Verbal;Demo  -RS  --     Reps 7  15  -RS  --     Time 7  3s  -RS  --        Exercise 8    Exercise Name 8  LAQ  -RS  --     Cueing 8  Verbal;Demo  -RS  --     Reps 8  15  -RS  --     Time 8  3s  -RS  --       User Key  (r) = Recorded By, (t) = Taken By, (c) = Cosigned By    Initials Name Provider Type    RS Rebecca Garcia, PT Physical Therapist                      Manual Rx (last 36 hours)      Manual Treatments     Row Name 01/11/21 1100             Total Minutes    94997 - PT Manual Therapy Minutes  15  -RS         Manual Rx 1    Manual Rx 1 Location  STM L HS and gastroc in HL  -RS         Manual Rx 2    Manual Rx 2 Location  manual L HS stretch  -RS        User Key  (r) = Recorded By, (t) = Taken By, (c) = Cosigned By    Initials Name Provider Type    RS Rebecca Garcia, PT Physical Therapist          PT OP Goals     Row Name 01/11/21 1200          PT Short Term Goals    STG Date to Achieve  01/25/21  -RS     STG 1  The pt will demonstrate IND and compliant with initial HEP focused on improved R knee mobility and quad strength.  -RS     STG 1 Progress  Ongoing  -RS     STG 2  The pt will demonstrate R knee  PROM to lacking no more than 5-110 for improved gait pattern.  -RS     STG 2 Progress  Ongoing  -RS        Long Term Goals    LTG 1  The pt will  demonstrate R knee AROM to at least 0-120 for improved transitional position and stair navigation.  -RS     LTG 1 Progress  Ongoing  -RS     LTG 2  The pt will demonstrate IND with progressive HEP focused on IND condition management and return to PLOF.  -RS     LTG 2 Progress  Ongoing  -RS     LTG 3  The pt will score greater than or equal to 65% ability on the KOS to indicate improved perceived performance of ADLs.  -RS     LTG 3 Progress  Ongoing  -RS     LTG 4  The pt will resume reciprocal stair navigation for improved community and household navigation.  -RS     LTG 4 Progress  Ongoing  -RS     LTG 5  The pt will demonstrate RLE strength to at least 4+/5 for improved stair navigation and transitional position performance.  -RS     LTG 5 Progress  Ongoing  -RS     LTG 6  The pt will go on 30 min walk for exercise without pain greater than 2/10 to facilitate improved recreational activity performance.  -RS     LTG 6 Progress  Ongoing  -RS       User Key  (r) = Recorded By, (t) = Taken By, (c) = Cosigned By    Initials Name Provider Type    RS Rebecca Garcia PT Physical Therapist                         Time Calculation:   Start Time: 1215  Stop Time: 1300  Time Calculation (min): 45 min  Therapy Charges for Today     Code Description Service Date Service Provider Modifiers Qty    59274361704  PT THER PROC EA 15 MIN 1/11/2021 Rebecca Garcia, PT GP 2    29976220038 HC PT MANUAL THERAPY EA 15 MIN 1/11/2021 Rebecca Garcia PT GP 1                    Rebecca Garcia PT  1/11/2021

## 2021-01-14 ENCOUNTER — HOSPITAL ENCOUNTER (OUTPATIENT)
Dept: PHYSICAL THERAPY | Facility: HOSPITAL | Age: 66
Setting detail: THERAPIES SERIES
Discharge: HOME | End: 2021-01-14

## 2021-01-14 DIAGNOSIS — M25.561 CHRONIC PAIN OF RIGHT KNEE: Primary | ICD-10-CM

## 2021-01-14 DIAGNOSIS — R26.2 DIFFICULTY WALKING: ICD-10-CM

## 2021-01-14 DIAGNOSIS — Z47.89 ORTHOPEDIC AFTERCARE: ICD-10-CM

## 2021-01-14 DIAGNOSIS — G89.29 CHRONIC PAIN OF RIGHT KNEE: Primary | ICD-10-CM

## 2021-01-14 PROCEDURE — 97110 THERAPEUTIC EXERCISES: CPT

## 2021-01-14 NOTE — THERAPY TREATMENT NOTE
Outpatient Physical Therapy Ortho Treatment Note  [facility]     Patient Name: Namrata Luz  : 1955  MRN: [id number]  Today's Date: 2021      Visit Date: 2021    Visit Dx:    ICD-10-CM ICD-9-CM   1. Chronic pain of right knee  M25.561 719.46    G89.29 338.29   2. Orthopedic aftercare  Z47.89 V54.9   3. Difficulty walking  R26.2 719.7       Patient Active Problem List   Diagnosis   • Abnormal liver function tests   • Asthma   • Mixed anxiety depressive disorder   • Hyperlipidemia   • Hypertension   • Hypothyroidism   • Lung nodule, solitary   • Psoriasis   • Vitamin D deficiency   • Right medial knee pain   • Type 2 diabetes mellitus without complication (CMS/HCC)   • Chronic pain of left knee   • CAMEJO (nonalcoholic steatohepatitis)   • Chronic pain of right knee   • Primary osteoarthritis of right knee        Past Medical History:   Diagnosis Date   • Allergic rhinitis    • Anxiety    • Arthritis    • Asthma    • Diabetes mellitus (CMS/HCC)    • Disease of thyroid gland    • Endometriosis    • Fractures    • Hyperlipidemia    • Hypertension     ON TOPROL for a diagnosis of MVP but was later determined no MVp and MD decided not to stop since she has been on a while. lisinopril is prescribed for diabetes   • Limited joint range of motion     LT KNEE   • CAMEJO (nonalcoholic steatohepatitis)    • Prediabetes    • Primary osteoarthritis of right knee 10/6/2020   • Psoriasis    • Renal insufficiency    • Sleep apnea     USES C-PAP   • Tachycardia    • Vitamin D deficiency         Past Surgical History:   Procedure Laterality Date   • APPENDECTOMY     • BACK SURGERY     • CATARACT EXTRACTION, BILATERAL Bilateral    • CHOLECYSTECTOMY  'S   • COLONOSCOPY     • EYE SURGERY     • HYSTERECTOMY     • JOINT REPLACEMENT     • KNEE ARTHROPLASTY UNICOMPARTMENTAL Left 2018    Procedure: KNEE ARTHROPLASTY UNICOMPARTMENTAL;  Surgeon: Emanuel Jack MD;  [facility]: [facility] MAIN OR;   Service: Orthopedics   • LUMBAR DISCECTOMY  1982   • TIBIA FASCIOTOMY Left 2006   • TOTAL KNEE ARTHROPLASTY Right 12/18/2020    Procedure: TOTAL KNEE ARTHROPLASTY;  Surgeon: Emanuel Jack MD;  Location: [facility] OR;  Service: Orthopedics;  Laterality: Right;   • TUBAL ABDOMINAL LIGATION Bilateral 1981                       PT Assessment/Plan     Row Name 01/14/21 1317          PT Assessment    Assessment Comments  Patient reporting improved tolerance to pain. Tolerated 2# LAQ/SAQ  -WS       User Key  (r) = Recorded By, (t) = Taken By, (c) = Cosigned By    Initials Name Provider Type    Brandon Chavira PTA Physical Therapy Assistant            OP Exercises     Row Name 01/14/21 1250             Subjective Comments    Subjective Comments  Took a Aleve. think it helped  -WS         Subjective Pain    Able to rate subjective pain?  yes  -WS      Pre-Treatment Pain Level  3  -WS         Total Minutes    63274 - PT Therapeutic Exercise Minutes  25  -WS         Exercise 1    Exercise Name 1  QS towel  -WS      Cueing 1  Verbal;Demo  -WS      Reps 1  10  -WS      Time 1  10s  -WS         Exercise 2    Exercise Name 2  heel prop demo  -WS      Time 2  2 min  -WS         Exercise 3    Exercise Name 3  heel slide seated  -WS      Cueing 3  Verbal;Demo  -WS      Reps 3  10  -WS      Time 3  10s  -WS         Exercise 4    Exercise Name 4  HS stretch seated  -WS      Cueing 4  Verbal;Demo  -WS      Reps 4  3  -WS      Time 4  20s  -WS         Exercise 5    Exercise Name 5  gastroc stretch step  -WS      Cueing 5  Verbal;Demo  -WS      Reps 5  3  -WS      Time 5  20s  -WS         Exercise 6    Exercise Name 6  nustep  -WS      Cueing 6  Verbal  -WS      Time 6  5 min  -WS      Additional Comments  LE only  -WS         Exercise 7    Exercise Name 7  SAQ  -WS      Cueing 7  Verbal;Demo  -WS      Reps 7  15  -WS      Time 7  3s  -WS      Additional Comments  2#  -WS         Exercise 8    Exercise Name 8  LAQ  -WS       Cueing 8  Verbal;Demo  -WS      Reps 8  15  -WS      Time 8  3s  -WS      Additional Comments  2#  -        User Key  (r) = Recorded By, (t) = Taken By, (c) = Cosigned By    Initials Name Provider Type    Brandon Chavira PTA Physical Therapy Assistant                       PT OP Goals     Row Name 01/14/21 1300          PT Short Term Goals    STG Date to Achieve  01/25/21  -     STG 1  The pt will demonstrate IND and compliant with initial HEP focused on improved R knee mobility and quad strength.  -     STG 1 Progress  Ongoing  -     STG 2  The pt will demonstrate R knee  PROM to lacking no more than 5-110 for improved gait pattern.  -     STG 2 Progress  Ongoing  -        Long Term Goals    LTG 1  The pt will demonstrate R knee AROM to at least 0-120 for improved transitional position and stair navigation.  -     LTG 1 Progress  Ongoing  -     LTG 2  The pt will demonstrate IND with progressive HEP focused on IND condition management and return to PLOF.  -     LTG 2 Progress  Ongoing  -     LTG 3  The pt will score greater than or equal to 65% ability on the KOS to indicate improved perceived performance of ADLs.  -     LTG 3 Progress  Ongoing  -     LTG 4  The pt will resume reciprocal stair navigation for improved community and household navigation.  -     LTG 4 Progress  Ongoing  -     LTG 5  The pt will demonstrate RLE strength to at least 4+/5 for improved stair navigation and transitional position performance.  -     LTG 5 Progress  Ongoing  -     LTG 6  The pt will go on 30 min walk for exercise without pain greater than 2/10 to facilitate improved recreational activity performance.  -     LTG 6 Progress  Ongoing  -       User Key  (r) = Recorded By, (t) = Taken By, (c) = Cosigned By    Initials Name Provider Type    Brandon Chavira PTA Physical Therapy Assistant          Therapy Education  Given: HEP  Program: Reinforced  How Provided: Verbal  Provided to:  Patient              Time Calculation:   Start Time: 1250  Stop Time: 1315  Time Calculation (min): 25 min  Therapy Charges for Today     Code Description Service Date Service Provider Modifiers Qty    [id number]  PT THER PROC EA 15 MIN 1/14/2021 Brandon Maciel, NATI GP 2                    Brandon Maciel PTA  1/14/2021

## 2021-01-18 ENCOUNTER — APPOINTMENT (OUTPATIENT)
Dept: PHYSICAL THERAPY | Facility: HOSPITAL | Age: 66
End: 2021-01-18

## 2021-01-18 ENCOUNTER — TELEPHONE (OUTPATIENT)
Dept: INTERNAL MEDICINE | Facility: CLINIC | Age: 66
End: 2021-01-18

## 2021-01-18 DIAGNOSIS — B37.31 VAGINAL CANDIDIASIS: ICD-10-CM

## 2021-01-18 DIAGNOSIS — B37.31 VAGINAL CANDIDIASIS: Primary | ICD-10-CM

## 2021-01-18 RX ORDER — FLUCONAZOLE 150 MG/1
150 TABLET ORAL ONCE
Qty: 3 TABLET | Refills: 0 | Status: SHIPPED | OUTPATIENT
Start: 2021-01-18 | End: 2021-01-18 | Stop reason: SDUPTHER

## 2021-01-18 RX ORDER — FLUCONAZOLE 150 MG/1
150 TABLET ORAL ONCE
Qty: 2 TABLET | Refills: 0 | Status: SHIPPED | OUTPATIENT
Start: 2021-01-18 | End: 2021-01-18

## 2021-01-18 NOTE — TELEPHONE ENCOUNTER
Pharmacy Name:  [facility]    [facility]    Pharmacy representative phone number: [phone]      What medication are you calling in regards to: FLUCONAZOLE    What question does the pharmacy have: CLARIFY DIRECTIONS-DIRECTIONS STATE 1 TABLET BY MOUTH FOR 1 DOSE BUT THERE ARE 3 TABLETS-NEED ADDITIONAL DIRECTIONS FOR THE OTHER 2 TABLETS    Who is the provider that prescribed the medication: IRIS SCHROEDER

## 2021-01-19 DIAGNOSIS — E03.9 ACQUIRED HYPOTHYROIDISM: ICD-10-CM

## 2021-01-19 DIAGNOSIS — I10 ESSENTIAL HYPERTENSION: ICD-10-CM

## 2021-01-19 RX ORDER — LISINOPRIL 5 MG/1
TABLET ORAL
Qty: 90 TABLET | Refills: 0 | Status: SHIPPED | OUTPATIENT
Start: 2021-01-19 | End: 2021-05-17

## 2021-01-19 RX ORDER — METOPROLOL SUCCINATE 100 MG/1
TABLET, EXTENDED RELEASE ORAL
Qty: 90 TABLET | Refills: 0 | Status: SHIPPED | OUTPATIENT
Start: 2021-01-19 | End: 2021-07-13

## 2021-01-19 RX ORDER — LEVOTHYROXINE SODIUM 88 UG/1
TABLET ORAL
Qty: 90 TABLET | Refills: 0 | Status: SHIPPED | OUTPATIENT
Start: 2021-01-19 | End: 2021-02-22 | Stop reason: SDUPTHER

## 2021-01-21 ENCOUNTER — HOSPITAL ENCOUNTER (OUTPATIENT)
Dept: PHYSICAL THERAPY | Facility: HOSPITAL | Age: 66
Setting detail: THERAPIES SERIES
Discharge: HOME | End: 2021-01-21

## 2021-01-21 DIAGNOSIS — R26.2 DIFFICULTY WALKING: ICD-10-CM

## 2021-01-21 DIAGNOSIS — G89.29 CHRONIC PAIN OF RIGHT KNEE: Primary | ICD-10-CM

## 2021-01-21 DIAGNOSIS — M25.561 CHRONIC PAIN OF RIGHT KNEE: Primary | ICD-10-CM

## 2021-01-21 DIAGNOSIS — Z47.89 ORTHOPEDIC AFTERCARE: ICD-10-CM

## 2021-01-21 PROCEDURE — 97110 THERAPEUTIC EXERCISES: CPT

## 2021-01-21 NOTE — THERAPY TREATMENT NOTE
Outpatient Physical Therapy Ortho Treatment Note  [facility]     Patient Name: Namrata Luz  : 1955  MRN: [id number]  Today's Date: 2021      Visit Date: 2021    Visit Dx:    ICD-10-CM ICD-9-CM   1. Chronic pain of right knee  M25.561 719.46    G89.29 338.29   2. Orthopedic aftercare  Z47.89 V54.9   3. Difficulty walking  R26.2 719.7       Patient Active Problem List   Diagnosis   • Abnormal liver function tests   • Asthma   • Mixed anxiety depressive disorder   • Hyperlipidemia   • Hypertension   • Hypothyroidism   • Lung nodule, solitary   • Psoriasis   • Vitamin D deficiency   • Right medial knee pain   • Type 2 diabetes mellitus without complication (CMS/HCC)   • Chronic pain of left knee   • CAMEJO (nonalcoholic steatohepatitis)   • Chronic pain of right knee   • Primary osteoarthritis of right knee        Past Medical History:   Diagnosis Date   • Allergic rhinitis    • Anxiety    • Arthritis    • Asthma    • Diabetes mellitus (CMS/HCC)    • Disease of thyroid gland    • Endometriosis    • Fractures    • Hyperlipidemia    • Hypertension     ON TOPROL for a diagnosis of MVP but was later determined no MVp and MD decided not to stop since she has been on a while. lisinopril is prescribed for diabetes   • Limited joint range of motion     LT KNEE   • CAMEJO (nonalcoholic steatohepatitis)    • Prediabetes    • Primary osteoarthritis of right knee 10/6/2020   • Psoriasis    • Renal insufficiency    • Sleep apnea     USES C-PAP   • Tachycardia    • Vitamin D deficiency         Past Surgical History:   Procedure Laterality Date   • APPENDECTOMY     • BACK SURGERY     • CATARACT EXTRACTION, BILATERAL Bilateral    • CHOLECYSTECTOMY  'S   • COLONOSCOPY     • EYE SURGERY     • HYSTERECTOMY     • JOINT REPLACEMENT     • KNEE ARTHROPLASTY UNICOMPARTMENTAL Left 2018    Procedure: KNEE ARTHROPLASTY UNICOMPARTMENTAL;  Surgeon: Emanuel Jack MD;  Location: [facility] MAIN OR;   Service: Orthopedics   • LUMBAR DISCECTOMY  1982   • TIBIA FASCIOTOMY Left 2006   • TOTAL KNEE ARTHROPLASTY Right 12/18/2020    Procedure: TOTAL KNEE ARTHROPLASTY;  Surgeon: Emanuel Jack MD;  Location: [facility] OR;  Service: Orthopedics;  Laterality: Right;   • TUBAL ABDOMINAL LIGATION Bilateral 1981                       PT Assessment/Plan     Row Name 01/21/21 1305          PT Assessment    Assessment Comments  Patient has a pocket of swelling right lateral knee. Will hold exercises until Monday ans re-assess  -WS       User Key  (r) = Recorded By, (t) = Taken By, (c) = Cosigned By    Initials Name Provider Type    WS Brandon Maciel PTA Physical Therapy Assistant            OP Exercises     Row Name 01/21/21 1240             Subjective Comments    Subjective Comments  have increased pain outside knee  -WS         Total Minutes    73058 - PT Therapeutic Exercise Minutes  25  -WS         Exercise 1    Exercise Name 1  QS towel  -WS      Cueing 1  Verbal;Demo  -WS      Reps 1  --  -WS      Time 1  --  -WS         Exercise 2    Exercise Name 2  heel prop demo  -WS      Time 2  --  -WS         Exercise 3    Exercise Name 3  stair stretch gently  -WS      Cueing 3  Verbal;Demo  -WS      Reps 3  3  -WS      Time 3  20  -WS         Exercise 4    Exercise Name 4  HS stretch seated  -WS      Cueing 4  Verbal;Demo  -WS      Reps 4  --  -WS      Time 4  --  -WS         Exercise 5    Exercise Name 5  gastroc stretch step  -WS      Cueing 5  Verbal;Demo  -WS      Reps 5  --  -WS      Time 5  --  -WS         Exercise 6    Exercise Name 6  nustep  -WS      Cueing 6  Verbal  -WS      Time 6  5 min  -WS         Exercise 7    Exercise Name 7  SAQ  -WS      Cueing 7  Verbal;Demo  -WS      Reps 7  15  -WS      Time 7  3s  -WS      Additional Comments  3#  -WS         Exercise 8    Exercise Name 8  LAQ  -WS      Cueing 8  Verbal;Demo  -WS      Reps 8  15  -WS      Time 8  3s  -WS      Additional Comments  3#  -WS        User  Key  (r) = Recorded By, (t) = Taken By, (c) = Cosigned By    Initials Name Provider Type    Brandon Chavira PTA Physical Therapy Assistant                       PT OP Goals     Row Name 01/21/21 1300          PT Short Term Goals    STG Date to Achieve  01/25/21  -     STG 1  The pt will demonstrate IND and compliant with initial HEP focused on improved R knee mobility and quad strength.  -     STG 1 Progress  Ongoing  -     STG 2  The pt will demonstrate R knee  PROM to lacking no more than 5-110 for improved gait pattern.  -     STG 2 Progress  Ongoing  -        Long Term Goals    LTG 1  The pt will demonstrate R knee AROM to at least 0-120 for improved transitional position and stair navigation.  -     LTG 1 Progress  Ongoing  -     LTG 2  The pt will demonstrate IND with progressive HEP focused on IND condition management and return to PLOF.  -     LTG 2 Progress  Ongoing  -     LTG 3  The pt will score greater than or equal to 65% ability on the KOS to indicate improved perceived performance of ADLs.  -     LTG 3 Progress  Ongoing  -     LTG 4  The pt will resume reciprocal stair navigation for improved community and household navigation.  -     LTG 4 Progress  Ongoing  -     LTG 5  The pt will demonstrate RLE strength to at least 4+/5 for improved stair navigation and transitional position performance.  -     LTG 5 Progress  Ongoing  -     LTG 6  The pt will go on 30 min walk for exercise without pain greater than 2/10 to facilitate improved recreational activity performance.  -     LTG 6 Progress  Ongoing  -       User Key  (r) = Recorded By, (t) = Taken By, (c) = Cosigned By    Initials Name Provider Type    Brandon Chavira PTA Physical Therapy Assistant          Therapy Education  Given: HEP, Symptoms/condition management, Pain management  Program: Reinforced  How Provided: Verbal  Provided to: Patient  Level of Understanding: Teach back education performed               Time Calculation:   Start Time: 1240  Stop Time: 1305  Time Calculation (min): 25 min  Therapy Charges for Today     Code Description Service Date Service Provider Modifiers Qty    [id number]  PT THER PROC EA 15 MIN 1/21/2021 Brandon Maciel, NATI GP 2                    Brandon Maciel PTA  1/21/2021

## 2021-01-25 ENCOUNTER — APPOINTMENT (OUTPATIENT)
Dept: PHYSICAL THERAPY | Facility: HOSPITAL | Age: 66
End: 2021-01-25

## 2021-01-28 ENCOUNTER — APPOINTMENT (OUTPATIENT)
Dept: PHYSICAL THERAPY | Facility: HOSPITAL | Age: 66
End: 2021-01-28

## 2021-01-28 ENCOUNTER — TELEPHONE (OUTPATIENT)
Dept: ORTHOPEDIC SURGERY | Facility: CLINIC | Age: 66
End: 2021-01-28

## 2021-01-30 ENCOUNTER — HOSPITAL ENCOUNTER (OUTPATIENT)
Dept: MAMMOGRAPHY | Facility: HOSPITAL | Age: 66
Discharge: HOME | End: 2021-01-30
Admitting: PHYSICIAN ASSISTANT

## 2021-01-30 DIAGNOSIS — Z12.31 SCREENING MAMMOGRAM, ENCOUNTER FOR: ICD-10-CM

## 2021-01-30 PROCEDURE — 77063 BREAST TOMOSYNTHESIS BI: CPT

## 2021-01-30 PROCEDURE — 77067 SCR MAMMO BI INCL CAD: CPT

## 2021-02-01 ENCOUNTER — HOSPITAL ENCOUNTER (OUTPATIENT)
Dept: PHYSICAL THERAPY | Facility: HOSPITAL | Age: 66
Setting detail: THERAPIES SERIES
Discharge: HOME | End: 2021-02-01

## 2021-02-01 DIAGNOSIS — Z47.89 ORTHOPEDIC AFTERCARE: ICD-10-CM

## 2021-02-01 DIAGNOSIS — R26.2 DIFFICULTY WALKING: ICD-10-CM

## 2021-02-01 DIAGNOSIS — G89.29 CHRONIC PAIN OF RIGHT KNEE: Primary | ICD-10-CM

## 2021-02-01 DIAGNOSIS — M25.561 CHRONIC PAIN OF RIGHT KNEE: Primary | ICD-10-CM

## 2021-02-01 PROCEDURE — 97110 THERAPEUTIC EXERCISES: CPT

## 2021-02-01 NOTE — THERAPY TREATMENT NOTE
Outpatient Physical Therapy Ortho Treatment Note  [facility]     Patient Name: Namrata Luz  : 1955  MRN: [id number]  Today's Date: 2021      Visit Date: 2021    Visit Dx:    ICD-10-CM ICD-9-CM   1. Chronic pain of right knee  M25.561 719.46    G89.29 338.29   2. Orthopedic aftercare  Z47.89 V54.9   3. Difficulty walking  R26.2 719.7       Patient Active Problem List   Diagnosis   • Abnormal liver function tests   • Asthma   • Mixed anxiety depressive disorder   • Hyperlipidemia   • Hypertension   • Hypothyroidism   • Lung nodule, solitary   • Psoriasis   • Vitamin D deficiency   • Right medial knee pain   • Type 2 diabetes mellitus without complication (CMS/HCC)   • Chronic pain of left knee   • CAMEJO (nonalcoholic steatohepatitis)   • Chronic pain of right knee   • Primary osteoarthritis of right knee        Past Medical History:   Diagnosis Date   • Allergic rhinitis    • Anxiety    • Arthritis    • Asthma    • Diabetes mellitus (CMS/HCC)    • Disease of thyroid gland    • Endometriosis    • Fractures    • Hyperlipidemia    • Hypertension     ON TOPROL for a diagnosis of MVP but was later determined no MVp and MD decided not to stop since she has been on a while. lisinopril is prescribed for diabetes   • Limited joint range of motion     LT KNEE   • CAMEJO (nonalcoholic steatohepatitis)    • Prediabetes    • Primary osteoarthritis of right knee 10/6/2020   • Psoriasis    • Renal insufficiency    • Sleep apnea     USES C-PAP   • Tachycardia    • Vitamin D deficiency         Past Surgical History:   Procedure Laterality Date   • APPENDECTOMY     • BACK SURGERY     • CATARACT EXTRACTION, BILATERAL Bilateral    • CHOLECYSTECTOMY  'S   • COLONOSCOPY     • EYE SURGERY     • HYSTERECTOMY     • JOINT REPLACEMENT     • KNEE ARTHROPLASTY UNICOMPARTMENTAL Left 2018    Procedure: KNEE ARTHROPLASTY UNICOMPARTMENTAL;  Surgeon: Emanuel Jack MD;  Location: [facility] MAIN OR;   Service: Orthopedics   • LUMBAR DISCECTOMY  1982   • TIBIA FASCIOTOMY Left 2006   • TOTAL KNEE ARTHROPLASTY Right 12/18/2020    Procedure: TOTAL KNEE ARTHROPLASTY;  Surgeon: Emanuel Jack MD;  Location: [facility];  Service: Orthopedics;  Laterality: Right;   • TUBAL ABDOMINAL LIGATION Bilateral 1981                       PT Assessment/Plan     Row Name 02/01/21 1310          PT Assessment    Assessment Comments  Patient ambulating with no AD with near normal gait. Has decreased pocket swelling lateral knee. RTD middle of Feb  -WS       User Key  (r) = Recorded By, (t) = Taken By, (c) = Cosigned By    Initials Name Provider Type    WS Brandon Maciel PTA Physical Therapy Assistant            OP Exercises     Row Name 02/01/21 1245             Subjective Comments    Subjective Comments  knee is tender. RTW from home  -WS         Subjective Pain    Able to rate subjective pain?  yes  -WS      Pre-Treatment Pain Level  1  -WS         Total Minutes    81739 - PT Therapeutic Exercise Minutes  30  -WS         Exercise 1    Exercise Name 1  QS towel  -WS      Cueing 1  Verbal;Demo  -WS      Reps 1  10  -WS      Time 1  10s  -WS         Exercise 2    Exercise Name 2  heel prop demo  -WS      Time 2  1 min  -WS         Exercise 3    Exercise Name 3  stair stretch gently  -WS      Cueing 3  Verbal;Demo  -WS      Reps 3  3  -WS      Time 3  20  -WS         Exercise 4    Exercise Name 4  HS stretch seated  -WS      Cueing 4  Verbal;Demo  -WS      Reps 4  3  -WS      Time 4  20s  -WS         Exercise 5    Exercise Name 5  gastroc stretch step  -WS      Cueing 5  Verbal;Demo  -WS      Reps 5  3  -WS      Time 5  20s  -WS         Exercise 6    Exercise Name 6  nustep  -WS      Cueing 6  Verbal  -WS      Time 6  5 min  -WS      Additional Comments  LE only  -WS         Exercise 7    Exercise Name 7  SAQ  -WS      Cueing 7  Verbal;Demo  -WS      Reps 7  15  -WS      Time 7  3s  -WS      Additional Comments  3#  -WS          Exercise 8    Exercise Name 8  LAQ  -WS      Cueing 8  Verbal;Demo  -WS      Reps 8  15  -WS      Time 8  3s  -WS      Additional Comments  3#  -WS         Exercise 9    Exercise Name 9  standing HS curl  -WS      Reps 9  15  -WS         Exercise 10    Exercise Name 10  calf raise  -WS      Reps 10  15  -WS        User Key  (r) = Recorded By, (t) = Taken By, (c) = Cosigned By    Initials Name Provider Type    Brandon Chavira PTA Physical Therapy Assistant                       PT OP Goals     Row Name 02/01/21 1300          PT Short Term Goals    STG Date to Achieve  01/25/21  -     STG 1  The pt will demonstrate IND and compliant with initial HEP focused on improved R knee mobility and quad strength.  -     STG 1 Progress  Met  -     STG 2  The pt will demonstrate R knee  PROM to lacking no more than 5-110 for improved gait pattern.  -     STG 2 Progress  Ongoing  -        Long Term Goals    LTG 1  The pt will demonstrate R knee AROM to at least 0-120 for improved transitional position and stair navigation.  -     LTG 1 Progress  Ongoing  -     LTG 2  The pt will demonstrate IND with progressive HEP focused on IND condition management and return to PLOF.  -     LTG 2 Progress  Ongoing  -     LTG 3  The pt will score greater than or equal to 65% ability on the KOS to indicate improved perceived performance of ADLs.  -     LTG 3 Progress  Ongoing  -     LTG 4  The pt will resume reciprocal stair navigation for improved community and household navigation.  -     LTG 4 Progress  Ongoing  -     LTG 5  The pt will demonstrate RLE strength to at least 4+/5 for improved stair navigation and transitional position performance.  -     LTG 5 Progress  Ongoing  -     LTG 6  The pt will go on 30 min walk for exercise without pain greater than 2/10 to facilitate improved recreational activity performance.  -     LTG 6 Progress  Ongoing  -       User Key  (r) = Recorded By, (t) = Taken By,  (c) = Cosigned By    Initials Name Provider Type    Brandon Chavira PTA Physical Therapy Assistant          Therapy Education  Given: HEP, Symptoms/condition management, Pain management, Edema management  Program: Reinforced  How Provided: Verbal  Provided to: Patient              Time Calculation:   Start Time: 1245  Stop Time: 1315  Time Calculation (min): 30 min  Therapy Charges for Today     Code Description Service Date Service Provider Modifiers Qty    84499237298 HC PT THER PROC EA 15 MIN 2/1/2021 Brandon Maciel PTA GP 2                    Brandon Maciel PTA  2/1/2021

## 2021-02-04 ENCOUNTER — HOSPITAL ENCOUNTER (OUTPATIENT)
Dept: PHYSICAL THERAPY | Facility: HOSPITAL | Age: 66
Setting detail: THERAPIES SERIES
Discharge: HOME | End: 2021-02-04

## 2021-02-04 DIAGNOSIS — R26.2 DIFFICULTY WALKING: ICD-10-CM

## 2021-02-04 DIAGNOSIS — Z47.89 ORTHOPEDIC AFTERCARE: ICD-10-CM

## 2021-02-04 DIAGNOSIS — M25.561 CHRONIC PAIN OF RIGHT KNEE: Primary | ICD-10-CM

## 2021-02-04 DIAGNOSIS — G89.29 CHRONIC PAIN OF RIGHT KNEE: Primary | ICD-10-CM

## 2021-02-04 PROCEDURE — 97110 THERAPEUTIC EXERCISES: CPT

## 2021-02-04 NOTE — THERAPY RE-EVALUATION
Outpatient Physical Therapy Ortho Re-Evaluation  [facility]     Patient Name: Namrata Luz  : 1955  MRN: [id number]  Today's Date: 2021      Visit Date: 2021    Patient Active Problem List   Diagnosis   • Abnormal liver function tests   • Asthma   • Mixed anxiety depressive disorder   • Hyperlipidemia   • Hypertension   • Hypothyroidism   • Lung nodule, solitary   • Psoriasis   • Vitamin D deficiency   • Right medial knee pain   • Type 2 diabetes mellitus without complication (CMS/HCC)   • Chronic pain of left knee   • CAMEJO (nonalcoholic steatohepatitis)   • Chronic pain of right knee   • Primary osteoarthritis of right knee        Past Medical History:   Diagnosis Date   • Allergic rhinitis    • Anxiety    • Arthritis    • Asthma    • Diabetes mellitus (CMS/HCC)    • Disease of thyroid gland    • Endometriosis    • Fractures    • Hyperlipidemia    • Hypertension     ON TOPROL for a diagnosis of MVP but was later determined no MVp and MD decided not to stop since she has been on a while. lisinopril is prescribed for diabetes   • Limited joint range of motion     LT KNEE   • CAMEJO (nonalcoholic steatohepatitis)    • Prediabetes    • Primary osteoarthritis of right knee 10/6/2020   • Psoriasis    • Renal insufficiency    • Sleep apnea     USES C-PAP   • Tachycardia    • Vitamin D deficiency         Past Surgical History:   Procedure Laterality Date   • APPENDECTOMY     • BACK SURGERY     • CATARACT EXTRACTION, BILATERAL Bilateral    • CHOLECYSTECTOMY  'S   • COLONOSCOPY     • EYE SURGERY     • HYSTERECTOMY     • JOINT REPLACEMENT     • KNEE ARTHROPLASTY UNICOMPARTMENTAL Left 2018    Procedure: KNEE ARTHROPLASTY UNICOMPARTMENTAL;  Surgeon: Emanuel Jack MD;  Location: [facility];  Service: Orthopedics   • LUMBAR DISCECTOMY     • TIBIA FASCIOTOMY Left    • TOTAL KNEE ARTHROPLASTY Right 2020    Procedure: TOTAL KNEE ARTHROPLASTY;  Surgeon: Emanuel Jack,  "MD;  Location: [facility] OR;  Service: Orthopedics;  Laterality: Right;   • TUBAL ABDOMINAL LIGATION Bilateral 1981       Visit Dx:     ICD-10-CM ICD-9-CM   1. Chronic pain of right knee  M25.561 719.46    G89.29 338.29   2. Orthopedic aftercare  Z47.89 V54.9   3. Difficulty walking  R26.2 719.7             PT Ortho     Row Name 02/04/21 1300       Subjective Pain    Able to rate subjective pain?  yes  (Pended)   -AB    Pre-Treatment Pain Level  0  (Pended)   -AB    Subjective Pain Comment  \"zinging\" pain on the outside lower leg below my knee  (Pended)   -AB       Right Lower Ext    Rt Knee Extension/Flexion AROM  lacking   -RS    Rt Knee Extension/Flexion PROM  lacking 7-122  -RS       MMT (Manual Muscle Testing)    Rt Lower Ext  Rt Hip Flexion;Rt Knee Extension;Rt Knee Flexion;Rt Ankle Dorsiflexion  (Pended)   -AB       MMT Right Lower Ext    Rt Hip Flexion MMT, Gross Movement  (4-/5) good minus  (Pended)   -AB    Rt Knee Extension MMT, Gross Movement  (4/5) good  (Pended)   -AB    Rt Knee Flexion MMT, Gross Movement  (4/5) good  (Pended)   -AB    Rt Ankle Dorsiflexion MMT, Gross Movement  (4/5) good  (Pended)   -AB      User Key  (r) = Recorded By, (t) = Taken By, (c) = Cosigned By    Initials Name Provider Type    RS Rebecca Garcia, PT Physical Therapist    AB Marcio Kahn, JULIA Student PT Student                            PT OP Goals     Row Name 02/04/21 1300          PT Short Term Goals    STG Date to Achieve  01/25/21  (Pended)   -AB     STG 1  The pt will demonstrate IND and compliant with initial HEP focused on improved R knee mobility and quad strength.  (Pended)   -AB     STG 1 Progress  Met  (Pended)   -AB     STG 2  The pt will demonstrate R knee  PROM to lacking no more than 5-110 for improved gait pattern.  (Pended)   -AB     STG 2 Progress  Ongoing  (Pended)   -AB     STG 2 Progress Comments  progressing but still lacking terminal knee extension of 7 degrees.  (Pended)   -AB        " Long Term Goals    LTG 1  The pt will demonstrate R knee AROM to at least 0-120 for improved transitional position and stair navigation.  (Pended)   -AB     LTG 1 Progress  Ongoing  (Pended)   -AB     LTG 2  The pt will demonstrate IND with progressive HEP focused on IND condition management and return to PLOF.  (Pended)   -AB     LTG 2 Progress  Ongoing  (Pended)   -AB     LTG 2 Progress Comments  patient reports that she is feeling 75-80% back to her PLOF  (Pended)   -AB     LTG 3  The pt will score greater than or equal to 65% ability on the KOS to indicate improved perceived performance of ADLs.  (Pended)   -AB     LTG 3 Progress  Met  (Pended)   -AB     LTG 3 Progress Comments  Patient scored 67%  (Pended)   -AB     LTG 4  The pt will resume reciprocal stair navigation for improved community and household navigation.  (Pended)   -AB     LTG 4 Progress  Ongoing  (Pended)   -AB     LTG 5  The pt will demonstrate RLE strength to at least 4+/5 for improved stair navigation and transitional position performance.  (Pended)   -AB     LTG 5 Progress  Ongoing  (Pended)   -AB     LTG 6  The pt will go on 30 min walk for exercise without pain greater than 2/10 to facilitate improved recreational activity performance.  (Pended)   -AB     LTG 6 Progress  Ongoing  (Pended)   -AB       User Key  (r) = Recorded By, (t) = Taken By, (c) = Cosigned By    Initials Name Provider Type    Marcio Boggs, PT Student PT Student          PT Assessment/Plan     Row Name 02/04/21 1400          PT Assessment    Functional Limitations  Decreased safety during functional activities;Impaired gait;Limitation in home management;Limitations in community activities;Limitations in functional capacity and performance;Performance in leisure activities;Performance in work activities  -RS     Impairments  Balance;Endurance;Gait;Range of motion;Posture;Poor body mechanics;Pain;Muscle strength;Joint mobility  -RS     Assessment Comments  Ms. Luz  "has been seen by skilled PT s/p R TKA on 12/18. She has met 1/2 STG and 1/5LTG nd is progressing toward remaining functional goals. She demonstrates improving R knee flexion and continue sto lack terminal knee extension. Gait pattern continues to improve however mild asymmetrry noted. She demonstrates improved strength compared to eval however is limited in eccentric control, speifically during stair navigation and transitioning on/off the toilet. She reports relatively well managed pain, continues to have pain in R aant tib/lateral R knee made worse with R hip relative IR. Discussed HEP, POC, progress toward goals, pt receptive and motivatd to participate with skilled PT.  -RS     Please refer to paper survey for additional self-reported information  Yes  -RS     Rehab Potential  Good  -RS     Patient/caregiver participated in establishment of treatment plan and goals  Yes  -RS     Patient would benefit from skilled therapy intervention  Yes  -RS        PT Plan    PT Plan Comments  Continue 1x a week for 3-4 weeks focused on improd eccentric control, stairs, transitional position performance.  -RS       User Key  (r) = Recorded By, (t) = Taken By, (c) = Cosigned By    Initials Name Provider Type    RS Rebecca Garcia, PT Physical Therapist            OP Exercises     Row Name 02/04/21 1300             Subjective Comments    Subjective Comments  Not having any pain today, Still haivng to go down the stairs on foot at time. Still getting some \"nerve pain\" down the outside of lower leg.   (Pended)   -AB         Subjective Pain    Able to rate subjective pain?  yes  (Pended)   -AB      Pre-Treatment Pain Level  0  (Pended)   -AB      Subjective Pain Comment  \"zinging\" pain on the outside lower leg below my knee  (Pended)   -AB         Total Minutes    90472 - PT Therapeutic Exercise Minutes  40  -RS         Exercise 1    Exercise Name 1  QS towel  (Pended)   -AB      Cueing 1  Verbal;Demo  (Pended)   -AB      Reps 1  " 10  (Pended)   -AB      Time 1  10s  (Pended)   -AB         Exercise 3    Exercise Name 3  stair stretch gently  (Pended)   -AB      Cueing 3  Verbal;Demo  (Pended)   -AB      Reps 3  3  (Pended)   -AB      Time 3  20  (Pended)   -AB         Exercise 4    Exercise Name 4  HS stretch seated  (Pended)   -AB      Cueing 4  Verbal;Demo  (Pended)   -AB      Reps 4  3  (Pended)   -AB      Time 4  20s  (Pended)   -AB         Exercise 5    Exercise Name 5  gastroc stretch step  (Pended)   -AB      Cueing 5  Verbal;Demo  (Pended)   -AB      Reps 5  3  (Pended)   -AB      Time 5  20s  (Pended)   -AB         Exercise 6    Exercise Name 6  RCB  (Pended)   -AB      Cueing 6  Verbal  (Pended)   -AB      Time 6  5 min  (Pended)   -AB         Exercise 7    Exercise Name 7  STS,   (Pended)   -AB      Sets 7  2  (Pended)   -AB      Reps 7  10  (Pended)   -AB      Additional Comments  Staggered stance w/ RLE in back  (Pended)   -AB         Exercise 8    Exercise Name 8  S/L Clam shells  (Pended)   -AB      Sets 8  2  (Pended)   -AB      Reps 8  12  (Pended)   -AB         Exercise 9    Exercise Name 9  Single leg step downs on 4 inch box  (Pended)   -AB      Sets 9  2  (Pended)   -AB      Reps 9  12  (Pended)   -AB      Additional Comments  heel taps, RLE stationary, // bars  (Pended)   -AB        User Key  (r) = Recorded By, (t) = Taken By, (c) = Cosigned By    Initials Name Provider Type    RS Rebecca Garcia, PT Physical Therapist    AB Marcio Kahn, JULIA Student PT Student                                  Time Calculation:     Start Time: 1245  Stop Time: 1330  Time Calculation (min): 45 min     Therapy Charges for Today     Code Description Service Date Service Provider Modifiers Qty    30078003208 HC PT THER PROC EA 15 MIN 2/4/2021 Rebecca Garcia, PT GP 3                    Rebecca Garcia PT  2/4/2021

## 2021-02-11 ENCOUNTER — APPOINTMENT (OUTPATIENT)
Dept: PHYSICAL THERAPY | Facility: HOSPITAL | Age: 66
End: 2021-02-11

## 2021-02-12 DIAGNOSIS — E11.9 TYPE 2 DIABETES MELLITUS WITHOUT COMPLICATION, WITHOUT LONG-TERM CURRENT USE OF INSULIN (HCC): ICD-10-CM

## 2021-02-12 RX ORDER — EMPAGLIFLOZIN 25 MG/1
TABLET, FILM COATED ORAL
Qty: 30 TABLET | Refills: 0 | Status: SHIPPED | OUTPATIENT
Start: 2021-02-12 | End: 2021-03-08

## 2021-02-22 DIAGNOSIS — E03.9 ACQUIRED HYPOTHYROIDISM: ICD-10-CM

## 2021-02-22 RX ORDER — LEVOTHYROXINE SODIUM 88 UG/1
TABLET ORAL
Qty: 90 TABLET | Refills: 0 | Status: SHIPPED | OUTPATIENT
Start: 2021-02-22 | End: 2021-03-08

## 2021-02-25 ENCOUNTER — OFFICE VISIT (OUTPATIENT)
Dept: ORTHOPEDIC SURGERY | Facility: CLINIC | Age: 66
End: 2021-02-25

## 2021-02-25 VITALS — BODY MASS INDEX: 30.62 KG/M2 | WEIGHT: 202 LBS | HEIGHT: 68 IN | TEMPERATURE: 97.3 F

## 2021-02-25 DIAGNOSIS — Z96.651 S/P TKR (TOTAL KNEE REPLACEMENT), RIGHT: ICD-10-CM

## 2021-02-25 DIAGNOSIS — R52 PAIN: Primary | ICD-10-CM

## 2021-02-25 PROCEDURE — 73562 X-RAY EXAM OF KNEE 3: CPT | Performed by: NURSE PRACTITIONER

## 2021-02-25 PROCEDURE — 99024 POSTOP FOLLOW-UP VISIT: CPT | Performed by: NURSE PRACTITIONER

## 2021-02-25 NOTE — PROGRESS NOTES
Namrata Luz : 1955 MRN: [id number] DATE: 2021    DIAGNOSIS: 8 week follow up right total knee      SUBJECTIVE:Patient returns today for 8 week follow up of right total knee replacement. Patient reports doing well with no unusual complaints. Appears to be progressing appropriately.    OBJECTIVE:   Exam:. The incision is well healed. No sign of infection. Range of motion is measured at 0 to 125. The calf is soft and nontender with a negative Homans sign. Strength is progressing and the patient is ambulating appropriately.    DIAGNOSTIC STUDIES  Xrays: 3 views of the right knee (AP, lateral, and sunrise) were ordered and reviewed for evaluation of recent knee replacement. They demonstrate a well positioned, well aligned knee replacement without complicating factors noted. In comparison with previous films there has been no change.    ASSESSMENT: 8 week status post right knee replacement.    PLAN: 1) Continue with PT exercises as prescribed   2) Follow up 10months    SACHI Monroy  2021

## 2021-03-07 DIAGNOSIS — E11.9 TYPE 2 DIABETES MELLITUS WITHOUT COMPLICATION, WITHOUT LONG-TERM CURRENT USE OF INSULIN (HCC): ICD-10-CM

## 2021-03-07 DIAGNOSIS — E03.9 ACQUIRED HYPOTHYROIDISM: ICD-10-CM

## 2021-03-07 DIAGNOSIS — F41.8 MIXED ANXIETY DEPRESSIVE DISORDER: ICD-10-CM

## 2021-03-08 RX ORDER — FLUOXETINE 10 MG/1
CAPSULE ORAL
Qty: 90 CAPSULE | Refills: 0 | Status: SHIPPED | OUTPATIENT
Start: 2021-03-08 | End: 2021-06-04

## 2021-03-08 RX ORDER — EMPAGLIFLOZIN 25 MG/1
TABLET, FILM COATED ORAL
Qty: 30 TABLET | Refills: 0 | Status: SHIPPED | OUTPATIENT
Start: 2021-03-08 | End: 2021-04-20

## 2021-03-08 RX ORDER — LEVOTHYROXINE SODIUM 88 UG/1
TABLET ORAL
Qty: 90 TABLET | Refills: 0 | Status: SHIPPED | OUTPATIENT
Start: 2021-03-08 | End: 2021-07-26

## 2021-03-16 ENCOUNTER — BULK ORDERING (OUTPATIENT)
Dept: CASE MANAGEMENT | Facility: OTHER | Age: 66
End: 2021-03-16

## 2021-03-16 DIAGNOSIS — Z23 IMMUNIZATION DUE: ICD-10-CM

## 2021-03-19 ENCOUNTER — APPOINTMENT (OUTPATIENT)
Dept: VACCINE CLINIC | Facility: HOSPITAL | Age: 66
End: 2021-03-19

## 2021-03-20 ENCOUNTER — IMMUNIZATION (OUTPATIENT)
Dept: VACCINE CLINIC | Facility: HOSPITAL | Age: 66
End: 2021-03-20

## 2021-03-20 DIAGNOSIS — Z23 IMMUNIZATION DUE: ICD-10-CM

## 2021-03-20 PROCEDURE — 0001A: CPT | Performed by: INTERNAL MEDICINE

## 2021-03-20 PROCEDURE — 91300 HC SARSCOV02 VAC 30MCG/0.3ML IM: CPT | Performed by: INTERNAL MEDICINE

## 2021-03-25 ENCOUNTER — TELEPHONE (OUTPATIENT)
Dept: ORTHOPEDIC SURGERY | Facility: CLINIC | Age: 66
End: 2021-03-25

## 2021-03-25 RX ORDER — CLINDAMYCIN HYDROCHLORIDE 300 MG/1
CAPSULE ORAL
Qty: 2 CAPSULE | Refills: 5 | Status: SHIPPED | OUTPATIENT
Start: 2021-03-25 | End: 2021-06-04

## 2021-03-25 NOTE — TELEPHONE ENCOUNTER
----- Message from SACHI Oseguera sent at 3/25/2021  1:12 PM EDT -----  Regarding: FW: Prescription Question  Contact: [phone]  Can you please take care of?  ----- Message -----  From: Reanna Piper MA  Sent: 3/25/2021  12:16 PM EDT  To: SACHI Oseguera  Subject: FW: Prescription Question                          ----- Message -----  From: Namrata Luz  Sent: 3/25/2021  10:04 AM EDT  To: [facility]  Subject: Prescription Question                            I have dental appointment on .    Please forward the antibiotic prescription to [facility].     Thank you  Namrata Luz    [id number]  [phone]

## 2021-03-25 NOTE — TELEPHONE ENCOUNTER
New prescription was sent to [facility] pharmacy at [phone] for clindamycin 300 mg, #2, directions are to take both capsules 1 hour prior to her procedure.  Refill x5 per MLL.  Left a message for the patient letting her know that the prescription had been sent in

## 2021-04-10 ENCOUNTER — IMMUNIZATION (OUTPATIENT)
Dept: VACCINE CLINIC | Facility: HOSPITAL | Age: 66
End: 2021-04-10

## 2021-04-10 PROCEDURE — 91300 HC SARSCOV02 VAC 30MCG/0.3ML IM: CPT | Performed by: INTERNAL MEDICINE

## 2021-04-10 PROCEDURE — 0002A: CPT | Performed by: INTERNAL MEDICINE

## 2021-04-20 DIAGNOSIS — E11.9 TYPE 2 DIABETES MELLITUS WITHOUT COMPLICATION, WITHOUT LONG-TERM CURRENT USE OF INSULIN (HCC): Primary | ICD-10-CM

## 2021-04-20 DIAGNOSIS — E11.9 TYPE 2 DIABETES MELLITUS WITHOUT COMPLICATION, WITHOUT LONG-TERM CURRENT USE OF INSULIN (HCC): ICD-10-CM

## 2021-04-20 RX ORDER — BLOOD SUGAR DIAGNOSTIC
STRIP MISCELLANEOUS
Qty: 100 EACH | Refills: 1 | Status: SHIPPED | OUTPATIENT
Start: 2021-04-20 | End: 2021-04-21 | Stop reason: SDUPTHER

## 2021-04-20 RX ORDER — EMPAGLIFLOZIN 25 MG/1
TABLET, FILM COATED ORAL
Qty: 30 TABLET | Refills: 0 | Status: SHIPPED | OUTPATIENT
Start: 2021-04-20 | End: 2021-05-24

## 2021-04-21 ENCOUNTER — TELEPHONE (OUTPATIENT)
Dept: INTERNAL MEDICINE | Facility: CLINIC | Age: 66
End: 2021-04-21

## 2021-04-21 DIAGNOSIS — E11.9 TYPE 2 DIABETES MELLITUS WITHOUT COMPLICATION, WITHOUT LONG-TERM CURRENT USE OF INSULIN (HCC): ICD-10-CM

## 2021-04-21 RX ORDER — BLOOD SUGAR DIAGNOSTIC
STRIP MISCELLANEOUS
Qty: 100 EACH | Refills: 1 | Status: SHIPPED | OUTPATIENT
Start: 2021-04-21

## 2021-04-21 NOTE — TELEPHONE ENCOUNTER
[facility] pharmacy is wanting to know how often the patient is using Accu Check test strips, please call [phone]

## 2021-05-15 DIAGNOSIS — I10 ESSENTIAL HYPERTENSION: ICD-10-CM

## 2021-05-15 DIAGNOSIS — E78.2 MIXED HYPERLIPIDEMIA: Primary | ICD-10-CM

## 2021-05-15 DIAGNOSIS — E11.9 TYPE 2 DIABETES MELLITUS WITHOUT COMPLICATION, WITHOUT LONG-TERM CURRENT USE OF INSULIN (HCC): ICD-10-CM

## 2021-05-17 RX ORDER — FENOFIBRATE 145 MG/1
TABLET, COATED ORAL
Qty: 90 TABLET | Refills: 0 | Status: SHIPPED | OUTPATIENT
Start: 2021-05-17 | End: 2021-10-13

## 2021-05-17 RX ORDER — LISINOPRIL 5 MG/1
TABLET ORAL
Qty: 90 TABLET | Refills: 0 | Status: SHIPPED | OUTPATIENT
Start: 2021-05-17 | End: 2021-11-29

## 2021-05-22 DIAGNOSIS — E11.9 TYPE 2 DIABETES MELLITUS WITHOUT COMPLICATION, WITHOUT LONG-TERM CURRENT USE OF INSULIN (HCC): ICD-10-CM

## 2021-05-24 RX ORDER — EMPAGLIFLOZIN 25 MG/1
TABLET, FILM COATED ORAL
Qty: 30 TABLET | Refills: 3 | Status: SHIPPED | OUTPATIENT
Start: 2021-05-24 | End: 2021-07-29 | Stop reason: SDUPTHER

## 2021-06-04 ENCOUNTER — DOCUMENTATION (OUTPATIENT)
Dept: PHYSICAL THERAPY | Facility: HOSPITAL | Age: 66
End: 2021-06-04

## 2021-06-04 ENCOUNTER — OFFICE VISIT (OUTPATIENT)
Dept: INTERNAL MEDICINE | Facility: CLINIC | Age: 66
End: 2021-06-04

## 2021-06-04 VITALS
BODY MASS INDEX: 31.07 KG/M2 | SYSTOLIC BLOOD PRESSURE: 120 MMHG | WEIGHT: 205 LBS | DIASTOLIC BLOOD PRESSURE: 72 MMHG | TEMPERATURE: 97.6 F | HEIGHT: 68 IN

## 2021-06-04 DIAGNOSIS — E03.9 ACQUIRED HYPOTHYROIDISM: ICD-10-CM

## 2021-06-04 DIAGNOSIS — Z47.89 ORTHOPEDIC AFTERCARE: ICD-10-CM

## 2021-06-04 DIAGNOSIS — Z23 NEED FOR PNEUMOCOCCAL VACCINE: ICD-10-CM

## 2021-06-04 DIAGNOSIS — M25.561 CHRONIC PAIN OF RIGHT KNEE: Primary | ICD-10-CM

## 2021-06-04 DIAGNOSIS — G89.29 CHRONIC PAIN OF RIGHT KNEE: Primary | ICD-10-CM

## 2021-06-04 DIAGNOSIS — E55.9 VITAMIN D DEFICIENCY: ICD-10-CM

## 2021-06-04 DIAGNOSIS — E78.2 MIXED HYPERLIPIDEMIA: ICD-10-CM

## 2021-06-04 DIAGNOSIS — I10 ESSENTIAL HYPERTENSION: ICD-10-CM

## 2021-06-04 DIAGNOSIS — E11.9 TYPE 2 DIABETES MELLITUS WITHOUT COMPLICATION, WITHOUT LONG-TERM CURRENT USE OF INSULIN (HCC): ICD-10-CM

## 2021-06-04 DIAGNOSIS — Z78.0 POSTMENOPAUSAL: ICD-10-CM

## 2021-06-04 DIAGNOSIS — J30.1 NON-SEASONAL ALLERGIC RHINITIS DUE TO POLLEN: Primary | ICD-10-CM

## 2021-06-04 DIAGNOSIS — F41.8 MIXED ANXIETY DEPRESSIVE DISORDER: ICD-10-CM

## 2021-06-04 DIAGNOSIS — F41.9 ANXIETY: ICD-10-CM

## 2021-06-04 DIAGNOSIS — R26.2 DIFFICULTY WALKING: ICD-10-CM

## 2021-06-04 PROCEDURE — 90471 IMMUNIZATION ADMIN: CPT | Performed by: PHYSICIAN ASSISTANT

## 2021-06-04 PROCEDURE — 99397 PER PM REEVAL EST PAT 65+ YR: CPT | Performed by: PHYSICIAN ASSISTANT

## 2021-06-04 PROCEDURE — 90670 PCV13 VACCINE IM: CPT | Performed by: PHYSICIAN ASSISTANT

## 2021-06-04 RX ORDER — FLUOXETINE HYDROCHLORIDE 20 MG/1
20 CAPSULE ORAL DAILY
Qty: 90 CAPSULE | Refills: 1 | Status: SHIPPED | OUTPATIENT
Start: 2021-06-04 | End: 2022-01-18

## 2021-06-04 RX ORDER — MONTELUKAST SODIUM 10 MG/1
10 TABLET ORAL NIGHTLY
Qty: 90 TABLET | Refills: 3 | Status: SHIPPED | OUTPATIENT
Start: 2021-06-04 | End: 2022-11-24 | Stop reason: SDUPTHER

## 2021-06-04 NOTE — THERAPY DISCHARGE NOTE
Outpatient Physical Therapy Discharge Summary         Patient Name: Namrata Luz  : 1955  MRN: [id number]    Today's Date: 2021    Visit Dx:    ICD-10-CM ICD-9-CM   1. Chronic pain of right knee  M25.561 719.46    G89.29 338.29   2. Orthopedic aftercare  Z47.89 V54.9   3. Difficulty walking  R26.2 719.7       PT OP Goals     Row Name 21 1500          PT Short Term Goals    STG Date to Achieve  21  -RS     STG 1  The pt will demonstrate IND and compliant with initial HEP focused on improved R knee mobility and quad strength.  -RS     STG 1 Progress  Met  -RS     STG 2  The pt will demonstrate R knee  PROM to lacking no more than 5-110 for improved gait pattern.  -RS     STG 2 Progress  Partially Met  -RS        Long Term Goals    LTG 1  The pt will demonstrate R knee AROM to at least 0-120 for improved transitional position and stair navigation.  -RS     LTG 1 Progress  Not Met  -RS     LTG 2  The pt will demonstrate IND with progressive HEP focused on IND condition management and return to PLOF.  -RS     LTG 2 Progress  Not Met  -RS     LTG 3  The pt will score greater than or equal to 65% ability on the KOS to indicate improved perceived performance of ADLs.  -RS     LTG 3 Progress  Met  -RS     LTG 4  The pt will resume reciprocal stair navigation for improved community and household navigation.  -RS     LTG 4 Progress  Not Met  -RS     LTG 5  The pt will demonstrate RLE strength to at least 4+/5 for improved stair navigation and transitional position performance.  -RS     LTG 5 Progress  Not Met  -RS     LTG 6  The pt will go on 30 min walk for exercise without pain greater than 2/10 to facilitate improved recreational activity performance.  -RS     LTG 6 Progress  Not Met  -RS       User Key  (r) = Recorded By, (t) = Taken By, (c) = Cosigned By    Initials Name Provider Type    Rebecca Viveros PT Physical Therapist          OP PT Discharge Summary  Date of Discharge:  "06/04/21  Reason for Discharge: Patient/Caregiver request  Outcomes Achieved: Refer to plan of care for updates on goals achieved  Discharge Destination: Home with home program  Discharge Instructions/Additional Comments: Per last re-eval note, \"Ms. Luz has been seen by skilled PT s/p R TKA on 12/18. She has met 1/2 STG and 1/5LTG nd is progressing toward remaining functional goals. She demonstrates improving R knee flexion and continue sto lack terminal knee extension. Gait pattern continues to improve however mild asymmetrry noted. She demonstrates improved strength compared to eval however is limited in eccentric control, speifically during stair navigation and transitioning on/off the toilet. She reports relatively well managed pain, continues to have pain in R ant tib/lateral R knee made worse with R hip relative IR. \" Pt did not return for follow up PT visists after last re-eval therefore unable to make any further progress toward functional goals. She is DC from PT at this time.      Time Calculation:                    Rebecca Garcia, PT  6/4/2021       "

## 2021-06-04 NOTE — PROGRESS NOTES
Subjective   Chief Complaint   Patient presents with   • Annual Exam       History of Present Illness     Pt is here today for CPE. She has had increased stress and anxiety at home and at work. Has been on her current dose of Prozac for several years, has never been increased. Needs labs today. Has been watching and working on her diet. She is compliant with her medications. She had a right total knee with Dr. Jack and is doing much better.     She has no CP, SOA, or MORRISSEY. No changes in bowels. Has eye exam in the next few weeks.      Patient Active Problem List   Diagnosis   • Asthma   • Mixed anxiety depressive disorder   • Hyperlipidemia   • Hypertension   • Hypothyroidism   • Lung nodule, solitary   • Psoriasis   • Vitamin D deficiency   • Type 2 diabetes mellitus without complication (CMS/HCC)   • Chronic pain of left knee   • CAMEJO (nonalcoholic steatohepatitis)       Allergies   Allergen Reactions   • Morphine And Related Hives   • Penicillins Hives   • Morphine Hives       Current Outpatient Medications on File Prior to Visit   Medication Sig Dispense Refill   • Accu-Chek Tavia Plus test strip USE AS DIRECTED TO TEST BLOOD SUGAR once DAILY 100 each 1   • Accu-Chek Softclix Lancets lancets USE AS DIRECTED TO TEST BLOOD SUGAR DAILY 100 each 1   • albuterol sulfate  (90 Base) MCG/ACT inhaler Inhale 2 puffs Every 4 (Four) Hours As Needed for Wheezing. 1 inhaler 0   • atorvastatin (LIPITOR) 40 MG tablet TAKE 1 TABLET DAILY AS DIRECTED 90 tablet 2   • clobetasol (TEMOVATE) 0.05 % external solution Apply 1 application topically to the appropriate area as directed As Needed (for psoriasis). 50 mL 2   • fenofibrate (TRICOR) 145 MG tablet Take 1 tablet by mouth once daily 90 tablet 0   • fluocinolone (SYNALAR) 0.01 % external solution Apply  topically 2 (Two) Times a Day. (Patient taking differently: Apply 1 application topically As Needed.) 60 mL 2   • fluticasone (FLONASE) 50 MCG/ACT nasal spray 2 sprays into  the nostril(s) as directed by provider Daily. 1 bottle 0   • glucose monitor monitoring kit 1 each As Needed (test as directed). accu check avia plus meter 1 each 0   • HYDROcodone-acetaminophen (Norco) 7.5-325 MG per tablet Take 1-2 tablets by mouth every 4-6 hours as needed for pain. Take 2 only for severe pain. 60 tablet 0   • Jardiance 25 MG tablet Take 1 tablet by mouth once daily 30 tablet 3   • levothyroxine (SYNTHROID, LEVOTHROID) 88 MCG tablet Take 1 tablet by mouth once daily 90 tablet 0   • lisinopril (PRINIVIL,ZESTRIL) 5 MG tablet Take 1 tablet by mouth once daily 90 tablet 0   • metFORMIN (GLUCOPHAGE) 1000 MG tablet Take 1 tablet by mouth twice daily 180 tablet 0   • metoprolol succinate XL (TOPROL-XL) 100 MG 24 hr tablet TAKE 1 TABLET BY MOUTH IN THE MORNING 90 tablet 0   • Vitamin D, Cholecalciferol, 50 MCG (2000 UT) capsule Take 2,000 Units by mouth Daily.     • [DISCONTINUED] FLUoxetine (PROzac) 10 MG capsule Take 1 capsule by mouth once daily 90 capsule 0   • [DISCONTINUED] levothyroxine (SYNTHROID, LEVOTHROID) 88 MCG tablet Take 1 tablet by mouth once daily 90 tablet 0     Current Facility-Administered Medications on File Prior to Visit   Medication Dose Route Frequency Provider Last Rate Last Admin   • Chlorhexidine Gluconate 2 % pads 2 each  2 pad Apply externally BID Emanuel Jack MD           Past Medical History:   Diagnosis Date   • Allergic rhinitis    • Anxiety    • Arthritis    • Asthma    • Diabetes mellitus (CMS/HCC)    • Disease of thyroid gland    • Endometriosis    • Fractures 2007   • Hyperlipidemia    • Hypertension     ON TOPROL for a diagnosis of MVP but was later determined no MVp and MD decided not to stop since she has been on a while. lisinopril is prescribed for diabetes   • Limited joint range of motion     LT KNEE   • CAMEJO (nonalcoholic steatohepatitis)    • Prediabetes    • Primary osteoarthritis of right knee 10/6/2020   • Psoriasis    • Renal insufficiency    • Sleep  apnea     USES C-PAP   • Tachycardia    • Vitamin D deficiency        Family History   Problem Relation Age of Onset   • Vaginal cancer Mother    • Lung cancer Mother    • Arthritis Mother    • Diabetes Mother            • Cancer Mother         Lung   • Coronary artery disease Father    • Arthritis Father    • Kidney cancer Father    • Cancer Father         Kidney   • Coronary artery disease Brother    • Asthma Sister    • Osteoporosis Sister    • Osteoporosis Maternal Grandmother            • Osteoporosis Sister         Being treated   • Malig Hyperthermia Neg Hx        Social History     Socioeconomic History   • Marital status:      Spouse name: Not on file   • Number of children: Not on file   • Years of education: Not on file   • Highest education level: Not on file   Tobacco Use   • Smoking status: Never Smoker   • Smokeless tobacco: Never Used   • Tobacco comment: lots of second hand smoke during childhood   Vaping Use   • Vaping Use: Never used   Substance and Sexual Activity   • Alcohol use: Yes     Alcohol/week: 0.0 standard drinks     Comment: maybe have 1 drink every couple of months   • Drug use: Never   • Sexual activity: Defer       Past Surgical History:   Procedure Laterality Date   • APPENDECTOMY     • BACK SURGERY     • CATARACT EXTRACTION, BILATERAL Bilateral    • CHOLECYSTECTOMY     • COLONOSCOPY     • EYE SURGERY     • HYSTERECTOMY     • JOINT REPLACEMENT     • KNEE ARTHROPLASTY UNICOMPARTMENTAL Left 2018    Procedure: KNEE ARTHROPLASTY UNICOMPARTMENTAL;  Surgeon: Emanuel Jack MD;  Location: [facility]  Service: Orthopedics   • LUMBAR DISCECTOMY     • TIBIA FASCIOTOMY Left    • TOTAL KNEE ARTHROPLASTY Right 2020    Procedure: TOTAL KNEE ARTHROPLASTY;  Surgeon: Emanuel Jack MD;  Location: [facility]  Service: Orthopedics;  Laterality: Right;   • TUBAL ABDOMINAL LIGATION Bilateral          The following portions of the  patient's history were reviewed and updated as appropriate: problem list, allergies, current medications, past medical history, past family history, past social history and past surgical history.    Review of Systems   Constitutional: Negative for chills, decreased appetite, fever, weight gain and weight loss.   HENT: Negative for congestion, ear pain, hearing loss, hoarse voice and sore throat.    Eyes: Negative for blurred vision and double vision.   Cardiovascular: Negative for chest pain, dyspnea on exertion, irregular heartbeat, leg swelling and palpitations.   Respiratory: Negative for cough, shortness of breath, snoring and wheezing.    Endocrine: Negative for polydipsia and polyuria.   Skin: Negative for rash and suspicious lesions.   Musculoskeletal: Negative for joint pain, joint swelling, muscle cramps and stiffness.   Gastrointestinal: Negative for abdominal pain, change in bowel habit, constipation, diarrhea, heartburn, hematochezia, melena, nausea and vomiting.   Genitourinary: Negative for bladder incontinence, non-menstrual bleeding and pelvic pain.   Neurological: Negative for dizziness, headaches, light-headedness, numbness, paresthesias and tremors.   Psychiatric/Behavioral: Negative for depression, memory loss and suicidal ideas. The patient is nervous/anxious. The patient does not have insomnia.        Immunization History   Administered Date(s) Administered   • COVID-19 (PFIZER) 03/20/2021, 04/10/2021   • Flu Mist 10/02/2013, 10/01/2014, 10/07/2015   • Flu Vaccine Quad PF >18YRS 09/22/2016, 12/12/2017   • Hepatitis A 05/12/2018, 12/04/2018   • Influenza, Unspecified 12/04/2018   • Pneumococcal Conjugate 13-Valent (PCV13) 06/04/2021   • Pneumococcal Polysaccharide (PPSV23) 04/17/2014   • Td 10/02/2005   • Tdap 04/17/2014, 09/15/2019   • Zostavax 08/24/2012       Objective   Vitals:    06/04/21 1510 06/04/21 1545   BP:  120/72   Temp: 97.6 °F (36.4 °C)    Weight: 93 kg (205 lb)    Height: 172.7  "cm (68\")      Body mass index is 31.17 kg/m².  Physical Exam  Vitals reviewed.   Constitutional:       Appearance: She is well-developed.   HENT:      Head: Normocephalic and atraumatic.      Mouth/Throat:      Mouth: Mucous membranes are moist.      Pharynx: Oropharynx is clear.   Eyes:      Extraocular Movements: Extraocular movements intact.      Conjunctiva/sclera: Conjunctivae normal.      Pupils: Pupils are equal, round, and reactive to light.   Neck:      Thyroid: No thyromegaly.      Vascular: No carotid bruit.   Cardiovascular:      Rate and Rhythm: Normal rate and regular rhythm.      Pulses:           Dorsalis pedis pulses are 2+ on the right side and 2+ on the left side.        Posterior tibial pulses are 2+ on the right side and 2+ on the left side.      Heart sounds: Normal heart sounds. No murmur heard.     Pulmonary:      Effort: Pulmonary effort is normal.      Breath sounds: Normal breath sounds.   Chest:      Chest wall: No mass.      Breasts: Breasts are symmetrical.         Right: Normal.         Left: Normal.   Abdominal:      General: There is no distension.      Palpations: Abdomen is soft. There is no mass.      Tenderness: There is no abdominal tenderness. There is no rebound.   Musculoskeletal:      Cervical back: Neck supple.   Feet:      Right foot:      Protective Sensation: 6 sites tested. 6 sites sensed.      Skin integrity: Skin integrity normal.      Left foot:      Protective Sensation: 6 sites tested. 6 sites sensed.      Skin integrity: Skin integrity normal.   Lymphadenopathy:      Cervical: No cervical adenopathy.      Upper Body:      Right upper body: No supraclavicular, axillary or pectoral adenopathy.      Left upper body: No supraclavicular, axillary or pectoral adenopathy.   Skin:     General: Skin is warm.   Neurological:      Mental Status: She is alert.   Psychiatric:         Behavior: Behavior normal.           Assessment/Plan   Diagnoses and all orders for this " visit:    1. Non-seasonal allergic rhinitis due to pollen (Primary)  Comments:  Add Singulair and work on consistenty with her zyrtec. If not improving will have her see allergist.   Orders:  -     montelukast (Singulair) 10 MG tablet; Take 1 tablet by mouth Every Night.  Dispense: 90 tablet; Refill: 3    2. Anxiety  -     FLUoxetine (PROzac) 20 MG capsule; Take 1 capsule by mouth Daily.  Dispense: 90 capsule; Refill: 1    3. Postmenopausal  -     DEXA Bone Density Axial    4. Need for pneumococcal vaccine  -     Pneumococcal Conjugate Vaccine 13-Valent All (PCV13)    5. Vitamin D deficiency    6. Type 2 diabetes mellitus without complication, without long-term current use of insulin (CMS/[facility])  Comments:  Needs lab today. Has eye exam scheduled in next few weeks.     7. Mixed anxiety depressive disorder  Comments:  Increase Prozac to 20 mg daily    8. Acquired hypothyroidism  Comments:  Lab today    9. Essential hypertension  Comments:  Well controlled.     10. Mixed hyperlipidemia  Comments:  Lab today        Return in about 5 months (around 11/4/2021) for Lab Today, Lab Appt Before FUP.

## 2021-06-07 PROBLEM — M17.11 PRIMARY OSTEOARTHRITIS OF RIGHT KNEE: Status: RESOLVED | Noted: 2020-10-06 | Resolved: 2021-06-07

## 2021-06-07 PROBLEM — G89.29 CHRONIC PAIN OF RIGHT KNEE: Status: RESOLVED | Noted: 2020-06-11 | Resolved: 2021-06-07

## 2021-06-07 PROBLEM — M25.561 CHRONIC PAIN OF RIGHT KNEE: Status: RESOLVED | Noted: 2020-06-11 | Resolved: 2021-06-07

## 2021-06-16 DIAGNOSIS — E11.9 TYPE 2 DIABETES MELLITUS WITHOUT COMPLICATION, WITHOUT LONG-TERM CURRENT USE OF INSULIN (HCC): Primary | ICD-10-CM

## 2021-06-16 RX ORDER — GLIPIZIDE 2.5 MG/1
2.5 TABLET, EXTENDED RELEASE ORAL DAILY
Qty: 30 TABLET | Refills: 3 | Status: SHIPPED | OUTPATIENT
Start: 2021-06-16 | End: 2021-10-13

## 2021-06-18 DIAGNOSIS — B37.9 YEAST INFECTION: Primary | ICD-10-CM

## 2021-06-18 RX ORDER — FLUCONAZOLE 150 MG/1
150 TABLET ORAL ONCE
Qty: 1 TABLET | Refills: 0 | Status: SHIPPED | OUTPATIENT
Start: 2021-06-18 | End: 2021-06-18

## 2021-07-09 DIAGNOSIS — B37.9 YEAST INFECTION: Primary | ICD-10-CM

## 2021-07-09 RX ORDER — NYSTATIN 100000 [USP'U]/G
POWDER TOPICAL 4 TIMES DAILY
Qty: 45 G | Refills: 1 | Status: SHIPPED | OUTPATIENT
Start: 2021-07-09

## 2021-07-09 RX ORDER — FLUCONAZOLE 150 MG/1
TABLET ORAL
Qty: 2 TABLET | Refills: 0 | Status: SHIPPED | OUTPATIENT
Start: 2021-07-09 | End: 2021-10-04 | Stop reason: SDUPTHER

## 2021-07-13 DIAGNOSIS — E11.9 TYPE 2 DIABETES MELLITUS WITHOUT COMPLICATION, WITHOUT LONG-TERM CURRENT USE OF INSULIN (HCC): ICD-10-CM

## 2021-07-13 DIAGNOSIS — I10 ESSENTIAL HYPERTENSION: ICD-10-CM

## 2021-07-13 RX ORDER — METOPROLOL SUCCINATE 100 MG/1
TABLET, EXTENDED RELEASE ORAL
Qty: 90 TABLET | Refills: 0 | Status: SHIPPED | OUTPATIENT
Start: 2021-07-13 | End: 2021-10-13

## 2021-07-24 DIAGNOSIS — E03.9 ACQUIRED HYPOTHYROIDISM: ICD-10-CM

## 2021-07-26 RX ORDER — LEVOTHYROXINE SODIUM 88 UG/1
TABLET ORAL
Qty: 90 TABLET | Refills: 0 | Status: SHIPPED | OUTPATIENT
Start: 2021-07-26 | End: 2021-11-01

## 2021-07-29 DIAGNOSIS — E11.9 TYPE 2 DIABETES MELLITUS WITHOUT COMPLICATION, WITHOUT LONG-TERM CURRENT USE OF INSULIN (HCC): ICD-10-CM

## 2021-07-30 DIAGNOSIS — E11.9 TYPE 2 DIABETES MELLITUS WITHOUT COMPLICATION, WITHOUT LONG-TERM CURRENT USE OF INSULIN (HCC): ICD-10-CM

## 2021-08-18 ENCOUNTER — HOSPITAL ENCOUNTER (OUTPATIENT)
Dept: BONE DENSITY | Facility: HOSPITAL | Age: 66
Discharge: HOME | End: 2021-08-18
Admitting: PHYSICIAN ASSISTANT

## 2021-08-18 PROCEDURE — 77080 DXA BONE DENSITY AXIAL: CPT

## 2021-08-31 RX ORDER — FLUOCINOLONE ACETONIDE 0.1 MG/ML
SOLUTION TOPICAL 2 TIMES DAILY
Qty: 60 ML | Refills: 2 | Status: SHIPPED | OUTPATIENT
Start: 2021-08-31

## 2021-09-14 ENCOUNTER — OFFICE VISIT (OUTPATIENT)
Dept: INTERNAL MEDICINE | Facility: CLINIC | Age: 66
End: 2021-09-14

## 2021-09-14 VITALS
SYSTOLIC BLOOD PRESSURE: 120 MMHG | DIASTOLIC BLOOD PRESSURE: 73 MMHG | WEIGHT: 202 LBS | HEIGHT: 68 IN | TEMPERATURE: 97.1 F | BODY MASS INDEX: 30.62 KG/M2

## 2021-09-14 DIAGNOSIS — G56.03 BILATERAL CARPAL TUNNEL SYNDROME: ICD-10-CM

## 2021-09-14 DIAGNOSIS — G56.20 CUBITAL TUNNEL SYNDROME, UNSPECIFIED LATERALITY: ICD-10-CM

## 2021-09-14 DIAGNOSIS — M54.2 NECK PAIN: Primary | ICD-10-CM

## 2021-09-14 PROCEDURE — 99214 OFFICE O/P EST MOD 30 MIN: CPT | Performed by: PHYSICIAN ASSISTANT

## 2021-09-14 RX ORDER — CYCLOBENZAPRINE HCL 10 MG
10 TABLET ORAL 3 TIMES DAILY PRN
Qty: 90 TABLET | Refills: 0 | Status: SHIPPED | OUTPATIENT
Start: 2021-09-14 | End: 2022-09-16

## 2021-09-14 NOTE — PROGRESS NOTES
Subjective   Chief Complaint   Patient presents with   • Numbness     both hands and arms        History of Present Illness     Having pain in her left upper arm a few weeks ago and ongoing neck stiffness and pain for a few weeks. She had PT and did dry needling in the past which worked great, would like to do again.     Has had numbness and tingling in her hands radiating up into her elbows bilaterally. Worse with typing, driving and sleeping at night. Works on a computer 9 hrs a day. Sx have been progressing for the last few weeks.     Patient Active Problem List   Diagnosis   • Asthma   • Mixed anxiety depressive disorder   • Hyperlipidemia   • Hypertension   • Hypothyroidism   • Lung nodule, solitary   • Psoriasis   • Vitamin D deficiency   • Type 2 diabetes mellitus without complication (CMS/HCC)   • Chronic pain of left knee   • CAMEJO (nonalcoholic steatohepatitis)       Allergies   Allergen Reactions   • Morphine And Related Hives   • Penicillins Hives   • Morphine Hives       Current Outpatient Medications on File Prior to Visit   Medication Sig Dispense Refill   • Accu-Chek Tavia Plus test strip USE AS DIRECTED TO TEST BLOOD SUGAR once DAILY 100 each 1   • Accu-Chek Softclix Lancets lancets USE AS DIRECTED TO TEST BLOOD SUGAR DAILY 100 each 1   • albuterol sulfate  (90 Base) MCG/ACT inhaler Inhale 2 puffs Every 4 (Four) Hours As Needed for Wheezing. 1 inhaler 0   • atorvastatin (LIPITOR) 40 MG tablet TAKE 1 TABLET DAILY AS DIRECTED 90 tablet 2   • clobetasol (TEMOVATE) 0.05 % external solution Apply 1 application topically to the appropriate area as directed As Needed (for psoriasis). 50 mL 2   • empagliflozin (Jardiance) 25 MG tablet tablet Take 1 tablet by mouth Daily. 30 tablet 3   • fenofibrate (TRICOR) 145 MG tablet Take 1 tablet by mouth once daily 90 tablet 0   • fluconazole (Diflucan) 150 MG tablet Take one tablet and repeat in 72 hours 2 tablet 0   • fluocinolone (SYNALAR) 0.01 % external  solution Apply  topically to the appropriate area as directed 2 (Two) Times a Day. 60 mL 2   • FLUoxetine (PROzac) 20 MG capsule Take 1 capsule by mouth Daily. 90 capsule 1   • fluticasone (FLONASE) 50 MCG/ACT nasal spray 2 sprays into the nostril(s) as directed by provider Daily. 1 bottle 0   • glipizide (glipiZIDE XL) 2.5 MG 24 hr tablet Take 1 tablet by mouth Daily. 30 tablet 3   • glucose monitor monitoring kit 1 each As Needed (test as directed). accu check avia plus meter 1 each 0   • HYDROcodone-acetaminophen (Norco) 7.5-325 MG per tablet Take 1-2 tablets by mouth every 4-6 hours as needed for pain. Take 2 only for severe pain. 60 tablet 0   • levothyroxine (SYNTHROID, LEVOTHROID) 88 MCG tablet Take 1 tablet by mouth once daily 90 tablet 0   • lisinopril (PRINIVIL,ZESTRIL) 5 MG tablet Take 1 tablet by mouth once daily 90 tablet 0   • metFORMIN (GLUCOPHAGE) 1000 MG tablet Take 1 tablet by mouth twice daily 180 tablet 0   • metoprolol succinate XL (TOPROL-XL) 100 MG 24 hr tablet TAKE 1 TABLET BY MOUTH IN THE MORNING 90 tablet 0   • montelukast (Singulair) 10 MG tablet Take 1 tablet by mouth Every Night. 90 tablet 3   • nystatin (MYCOSTATIN) 257894 UNIT/GM powder Apply  topically to the appropriate area as directed 4 (Four) Times a Day. 45 g 1   • Vitamin D, Cholecalciferol, 50 MCG (2000 UT) capsule Take 2,000 Units by mouth Daily.     • [DISCONTINUED] FLUoxetine (PROzac) 10 MG capsule Take 1 capsule by mouth once daily 90 capsule 0   • [DISCONTINUED] levothyroxine (SYNTHROID, LEVOTHROID) 88 MCG tablet Take 1 tablet by mouth once daily 90 tablet 0     Current Facility-Administered Medications on File Prior to Visit   Medication Dose Route Frequency Provider Last Rate Last Admin   • Chlorhexidine Gluconate 2 % pads 2 each  2 pad Apply externally BID Emanuel Jakc MD           Past Medical History:   Diagnosis Date   • Allergic rhinitis    • Anxiety    • Arthritis    • Asthma    • Diabetes mellitus (CMS/HCC)    •  Disease of thyroid gland    • Endometriosis    • Fractures    • Hyperlipidemia    • Hypertension     ON TOPROL for a diagnosis of MVP but was later determined no MVp and MD decided not to stop since she has been on a while. lisinopril is prescribed for diabetes   • Limited joint range of motion     LT KNEE   • CAMEJO (nonalcoholic steatohepatitis)    • Prediabetes    • Primary osteoarthritis of right knee 10/6/2020   • Psoriasis    • Renal insufficiency    • Sleep apnea     USES C-PAP   • Tachycardia    • Vitamin D deficiency        Family History   Problem Relation Age of Onset   • Vaginal cancer Mother    • Lung cancer Mother    • Arthritis Mother    • Diabetes Mother            • Cancer Mother         Lung   • Coronary artery disease Father    • Arthritis Father    • Kidney cancer Father    • Cancer Father         Kidney   • Coronary artery disease Brother    • Asthma Sister    • Osteoporosis Sister    • Osteoporosis Maternal Grandmother            • Osteoporosis Sister         Being treated   • Malig Hyperthermia Neg Hx        Social History     Socioeconomic History   • Marital status:      Spouse name: Not on file   • Number of children: Not on file   • Years of education: Not on file   • Highest education level: Not on file   Tobacco Use   • Smoking status: Never Smoker   • Smokeless tobacco: Never Used   • Tobacco comment: lots of second hand smoke during childhood   Vaping Use   • Vaping Use: Never used   Substance and Sexual Activity   • Alcohol use: Yes     Alcohol/week: 0.0 standard drinks     Comment: maybe have 1 drink every couple of months   • Drug use: Never   • Sexual activity: Defer       Past Surgical History:   Procedure Laterality Date   • APPENDECTOMY     • BACK SURGERY     • CATARACT EXTRACTION, BILATERAL Bilateral    • CHOLECYSTECTOMY     • COLONOSCOPY     • EYE SURGERY     • HYSTERECTOMY     • JOINT REPLACEMENT     • KNEE ARTHROPLASTY  "UNICOMPARTMENTAL Left 6/1/2018    Procedure: KNEE ARTHROPLASTY UNICOMPARTMENTAL;  Surgeon: Emanuel Jack MD;  Location: [facility] OR;  Service: Orthopedics   • LUMBAR DISCECTOMY  1982   • TIBIA FASCIOTOMY Left 2006   • TOTAL KNEE ARTHROPLASTY Right 12/18/2020    Procedure: TOTAL KNEE ARTHROPLASTY;  Surgeon: Emanuel Jack MD;  Location: [facility] OR;  Service: Orthopedics;  Laterality: Right;   • TUBAL ABDOMINAL LIGATION Bilateral 1981         The following portions of the patient's history were reviewed and updated as appropriate: problem list, allergies, current medications, past medical history, past family history, past social history and past surgical history.    Review of Systems   Musculoskeletal: Positive for neck pain.   Neurological: Positive for numbness and paresthesias.       Immunization History   Administered Date(s) Administered   • COVID-19 (PFIZER) 03/20/2021, 04/10/2021   • Flu Vaccine Quad PF >18YRS 09/22/2016, 12/12/2017   • FluMist 2-49yrs 10/02/2013, 10/01/2014, 10/07/2015   • Hepatitis A 05/12/2018, 12/04/2018   • Influenza, Unspecified 12/04/2018   • Pneumococcal Conjugate 13-Valent (PCV13) 06/04/2021   • Pneumococcal Polysaccharide (PPSV23) 04/17/2014   • Td 10/02/2005   • Tdap 04/17/2014, 09/15/2019   • Zostavax 08/24/2012       Objective   Vitals:    09/14/21 1126 09/14/21 1626   BP:  120/73   Temp: 97.1 °F (36.2 °C)    Weight: 91.6 kg (202 lb)    Height: 172.7 cm (68\")      Body mass index is 30.71 kg/m².  Physical Exam  Vitals reviewed.   Constitutional:       Appearance: Normal appearance.   HENT:      Head: Normocephalic and atraumatic.   Musculoskeletal:      Comments: TTP over right trapezius and scalene muscle. Positive tinel's sign   Neurological:      Mental Status: She is alert.       Assessment/Plan   Diagnoses and all orders for this visit:    1. Neck pain (Primary)  -     Ambulatory Referral to Physical Therapy Evaluate and treat    2. Cubital tunnel syndrome, unspecified " laterality  -     Ambulatory Referral to Hand Surgery    3. Bilateral carpal tunnel syndrome  -     Ambulatory Referral to Hand Surgery    Other orders  -     cyclobenzaprine (FLEXERIL) 10 MG tablet; Take 1 tablet by mouth 3 (Three) Times a Day As Needed for Muscle Spasms.  Dispense: 90 tablet; Refill: 0    Referral to PT for dry needling for her neck and sent in muscle relaxants to try. Start wearing wrist splints bilaterally for carpal tunnel and referral to hand surgery as I believe she needs cortisone injections.

## 2021-09-24 DIAGNOSIS — E11.9 TYPE 2 DIABETES MELLITUS WITHOUT COMPLICATION, WITHOUT LONG-TERM CURRENT USE OF INSULIN (HCC): ICD-10-CM

## 2021-09-24 DIAGNOSIS — E78.2 MIXED HYPERLIPIDEMIA: Primary | ICD-10-CM

## 2021-09-28 ENCOUNTER — APPOINTMENT (OUTPATIENT)
Dept: PHYSICAL THERAPY | Facility: HOSPITAL | Age: 66
End: 2021-09-28

## 2021-10-04 ENCOUNTER — OFFICE VISIT (OUTPATIENT)
Dept: INTERNAL MEDICINE | Facility: CLINIC | Age: 66
End: 2021-10-04

## 2021-10-04 VITALS
TEMPERATURE: 96.9 F | BODY MASS INDEX: 31.22 KG/M2 | HEIGHT: 68 IN | DIASTOLIC BLOOD PRESSURE: 60 MMHG | SYSTOLIC BLOOD PRESSURE: 112 MMHG | WEIGHT: 206 LBS

## 2021-10-04 DIAGNOSIS — R91.1 LUNG NODULE, SOLITARY: ICD-10-CM

## 2021-10-04 DIAGNOSIS — B37.9 YEAST INFECTION: Primary | ICD-10-CM

## 2021-10-04 DIAGNOSIS — E78.2 MIXED HYPERLIPIDEMIA: ICD-10-CM

## 2021-10-04 DIAGNOSIS — E55.9 VITAMIN D DEFICIENCY: ICD-10-CM

## 2021-10-04 DIAGNOSIS — E11.9 TYPE 2 DIABETES MELLITUS WITHOUT COMPLICATION, WITHOUT LONG-TERM CURRENT USE OF INSULIN (HCC): ICD-10-CM

## 2021-10-04 DIAGNOSIS — I10 PRIMARY HYPERTENSION: ICD-10-CM

## 2021-10-04 DIAGNOSIS — J45.909 ASTHMA, UNSPECIFIED ASTHMA SEVERITY, UNSPECIFIED WHETHER COMPLICATED, UNSPECIFIED WHETHER PERSISTENT: ICD-10-CM

## 2021-10-04 DIAGNOSIS — E03.9 ACQUIRED HYPOTHYROIDISM: ICD-10-CM

## 2021-10-04 DIAGNOSIS — G56.03 BILATERAL CARPAL TUNNEL SYNDROME: ICD-10-CM

## 2021-10-04 DIAGNOSIS — F41.8 MIXED ANXIETY DEPRESSIVE DISORDER: ICD-10-CM

## 2021-10-04 PROCEDURE — 99214 OFFICE O/P EST MOD 30 MIN: CPT | Performed by: PHYSICIAN ASSISTANT

## 2021-10-04 RX ORDER — FLUCONAZOLE 150 MG/1
TABLET ORAL
Qty: 2 TABLET | Refills: 5 | Status: SHIPPED | OUTPATIENT
Start: 2021-10-04

## 2021-10-13 DIAGNOSIS — E11.9 TYPE 2 DIABETES MELLITUS WITHOUT COMPLICATION, WITHOUT LONG-TERM CURRENT USE OF INSULIN (HCC): ICD-10-CM

## 2021-10-13 DIAGNOSIS — I10 ESSENTIAL HYPERTENSION: ICD-10-CM

## 2021-10-13 DIAGNOSIS — E78.2 MIXED HYPERLIPIDEMIA: ICD-10-CM

## 2021-10-13 RX ORDER — GLIPIZIDE 2.5 MG/1
TABLET, EXTENDED RELEASE ORAL
Qty: 30 TABLET | Refills: 0 | Status: SHIPPED | OUTPATIENT
Start: 2021-10-13 | End: 2021-11-01

## 2021-10-13 RX ORDER — METOPROLOL SUCCINATE 100 MG/1
TABLET, EXTENDED RELEASE ORAL
Qty: 90 TABLET | Refills: 0 | Status: SHIPPED | OUTPATIENT
Start: 2021-10-13 | End: 2021-12-01

## 2021-10-13 RX ORDER — FENOFIBRATE 145 MG/1
TABLET, COATED ORAL
Qty: 90 TABLET | Refills: 0 | Status: SHIPPED | OUTPATIENT
Start: 2021-10-13 | End: 2021-11-01

## 2021-10-31 DIAGNOSIS — E78.2 MIXED HYPERLIPIDEMIA: ICD-10-CM

## 2021-10-31 DIAGNOSIS — E03.9 ACQUIRED HYPOTHYROIDISM: ICD-10-CM

## 2021-10-31 DIAGNOSIS — E11.9 TYPE 2 DIABETES MELLITUS WITHOUT COMPLICATION, WITHOUT LONG-TERM CURRENT USE OF INSULIN (HCC): ICD-10-CM

## 2021-11-01 RX ORDER — GLIPIZIDE 2.5 MG/1
TABLET, EXTENDED RELEASE ORAL
Qty: 30 TABLET | Refills: 0 | Status: SHIPPED | OUTPATIENT
Start: 2021-11-01 | End: 2021-11-29

## 2021-11-01 RX ORDER — FENOFIBRATE 145 MG/1
TABLET, COATED ORAL
Qty: 90 TABLET | Refills: 0 | Status: SHIPPED | OUTPATIENT
Start: 2021-11-01 | End: 2022-01-18

## 2021-11-01 RX ORDER — LEVOTHYROXINE SODIUM 88 UG/1
TABLET ORAL
Qty: 90 TABLET | Refills: 0 | Status: SHIPPED | OUTPATIENT
Start: 2021-11-01 | End: 2021-12-01

## 2021-11-05 ENCOUNTER — TELEPHONE (OUTPATIENT)
Dept: INTERNAL MEDICINE | Facility: CLINIC | Age: 66
End: 2021-11-05
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
## 2021-11-27 DIAGNOSIS — I10 ESSENTIAL HYPERTENSION: ICD-10-CM

## 2021-11-27 DIAGNOSIS — E11.9 TYPE 2 DIABETES MELLITUS WITHOUT COMPLICATION, WITHOUT LONG-TERM CURRENT USE OF INSULIN (HCC): ICD-10-CM

## 2021-11-29 RX ORDER — GLIPIZIDE 2.5 MG/1
TABLET, EXTENDED RELEASE ORAL
Qty: 30 TABLET | Refills: 3 | Status: SHIPPED | OUTPATIENT
Start: 2021-11-29 | End: 2022-01-25 | Stop reason: SDUPTHER

## 2021-11-29 RX ORDER — LISINOPRIL 5 MG/1
TABLET ORAL
Qty: 90 TABLET | Refills: 0 | Status: SHIPPED | OUTPATIENT
Start: 2021-11-29 | End: 2022-01-18

## 2021-12-01 DIAGNOSIS — E11.9 TYPE 2 DIABETES MELLITUS WITHOUT COMPLICATION, WITHOUT LONG-TERM CURRENT USE OF INSULIN (HCC): ICD-10-CM

## 2021-12-01 DIAGNOSIS — I10 ESSENTIAL HYPERTENSION: ICD-10-CM

## 2021-12-01 DIAGNOSIS — E03.9 ACQUIRED HYPOTHYROIDISM: ICD-10-CM

## 2021-12-01 RX ORDER — METOPROLOL SUCCINATE 100 MG/1
TABLET, EXTENDED RELEASE ORAL
Qty: 90 TABLET | Refills: 0 | Status: SHIPPED | OUTPATIENT
Start: 2021-12-01 | End: 2022-01-18

## 2021-12-01 RX ORDER — LEVOTHYROXINE SODIUM 88 UG/1
TABLET ORAL
Qty: 90 TABLET | Refills: 0 | Status: SHIPPED | OUTPATIENT
Start: 2021-12-01 | End: 2022-02-22

## 2022-01-18 DIAGNOSIS — I10 ESSENTIAL HYPERTENSION: ICD-10-CM

## 2022-01-18 DIAGNOSIS — F41.9 ANXIETY: ICD-10-CM

## 2022-01-18 DIAGNOSIS — E78.2 MIXED HYPERLIPIDEMIA: ICD-10-CM

## 2022-01-18 RX ORDER — LISINOPRIL 5 MG/1
TABLET ORAL
Qty: 90 TABLET | Refills: 0 | Status: SHIPPED | OUTPATIENT
Start: 2022-01-18 | End: 2022-03-09

## 2022-01-18 RX ORDER — METOPROLOL SUCCINATE 100 MG/1
TABLET, EXTENDED RELEASE ORAL
Qty: 90 TABLET | Refills: 0 | Status: SHIPPED | OUTPATIENT
Start: 2022-01-18 | End: 2022-04-18

## 2022-01-18 RX ORDER — FENOFIBRATE 145 MG/1
TABLET, COATED ORAL
Qty: 90 TABLET | Refills: 0 | Status: SHIPPED | OUTPATIENT
Start: 2022-01-18 | End: 2022-04-18

## 2022-01-18 RX ORDER — FLUOXETINE HYDROCHLORIDE 20 MG/1
CAPSULE ORAL
Qty: 90 CAPSULE | Refills: 0 | Status: SHIPPED | OUTPATIENT
Start: 2022-01-18 | End: 2022-04-25

## 2022-01-25 DIAGNOSIS — E11.9 TYPE 2 DIABETES MELLITUS WITHOUT COMPLICATION, WITHOUT LONG-TERM CURRENT USE OF INSULIN: ICD-10-CM

## 2022-01-25 RX ORDER — GLIPIZIDE 2.5 MG/1
2.5 TABLET, EXTENDED RELEASE ORAL DAILY
Qty: 30 TABLET | Refills: 3 | Status: SHIPPED | OUTPATIENT
Start: 2022-01-25 | End: 2022-03-09

## 2022-02-21 DIAGNOSIS — E11.9 TYPE 2 DIABETES MELLITUS WITHOUT COMPLICATION, WITHOUT LONG-TERM CURRENT USE OF INSULIN: ICD-10-CM

## 2022-02-21 DIAGNOSIS — E03.9 ACQUIRED HYPOTHYROIDISM: ICD-10-CM

## 2022-02-22 RX ORDER — LEVOTHYROXINE SODIUM 88 UG/1
TABLET ORAL
Qty: 90 TABLET | Refills: 0 | Status: SHIPPED | OUTPATIENT
Start: 2022-02-22 | End: 2022-04-22

## 2022-03-08 ENCOUNTER — OFFICE VISIT (OUTPATIENT)
Dept: INTERNAL MEDICINE | Facility: CLINIC | Age: 67
End: 2022-03-08

## 2022-03-08 VITALS
HEIGHT: 68 IN | DIASTOLIC BLOOD PRESSURE: 70 MMHG | BODY MASS INDEX: 31.67 KG/M2 | SYSTOLIC BLOOD PRESSURE: 116 MMHG | WEIGHT: 209 LBS | TEMPERATURE: 96.9 F

## 2022-03-08 DIAGNOSIS — E78.2 MIXED HYPERLIPIDEMIA: ICD-10-CM

## 2022-03-08 DIAGNOSIS — Z12.31 SCREENING MAMMOGRAM, ENCOUNTER FOR: Primary | ICD-10-CM

## 2022-03-08 DIAGNOSIS — E11.9 TYPE 2 DIABETES MELLITUS WITHOUT COMPLICATION, WITHOUT LONG-TERM CURRENT USE OF INSULIN: ICD-10-CM

## 2022-03-08 DIAGNOSIS — K75.81 NASH (NONALCOHOLIC STEATOHEPATITIS): ICD-10-CM

## 2022-03-08 DIAGNOSIS — E55.9 VITAMIN D DEFICIENCY: ICD-10-CM

## 2022-03-08 DIAGNOSIS — E03.9 ACQUIRED HYPOTHYROIDISM: ICD-10-CM

## 2022-03-08 DIAGNOSIS — I10 PRIMARY HYPERTENSION: ICD-10-CM

## 2022-03-08 PROCEDURE — 99214 OFFICE O/P EST MOD 30 MIN: CPT | Performed by: PHYSICIAN ASSISTANT

## 2022-03-08 NOTE — PROGRESS NOTES
Subjective   Chief Complaint   Patient presents with   • Hypertension   • Hyperlipidemia   • Hypothyroidism   • Diabetes       History of Present Illness     Pt is here today for fup on her HTN, DM, HLD, and hypothyroidism. Did not have her labs after her last visit. Has not had lab work since Last summer. No CP, SOA or palpitations. She is retired now, and feels great. Mood is doing great.      Patient Active Problem List   Diagnosis   • Asthma   • Mixed anxiety depressive disorder   • Hyperlipidemia   • Hypertension   • Hypothyroidism   • Lung nodule, solitary   • Psoriasis   • Vitamin D deficiency   • Type 2 diabetes mellitus without complication (HCC)   • Chronic pain of left knee   • CAMEJO (nonalcoholic steatohepatitis)   • Allergic rhinitis   • Bilateral carpal tunnel syndrome       Allergies   Allergen Reactions   • Morphine And Related Hives   • Penicillins Hives   • Morphine Hives       Current Outpatient Medications on File Prior to Visit   Medication Sig Dispense Refill   • Accu-Chek Tavia Plus test strip USE AS DIRECTED TO TEST BLOOD SUGAR once DAILY 100 each 1   • Accu-Chek Softclix Lancets lancets USE AS DIRECTED TO TEST BLOOD SUGAR DAILY 100 each 1   • clobetasol (TEMOVATE) 0.05 % external solution Apply 1 application topically to the appropriate area as directed As Needed (for psoriasis). 50 mL 2   • empagliflozin (Jardiance) 25 MG tablet tablet Take 1 tablet by mouth Daily. 30 tablet 3   • Euthyrox 88 MCG tablet Take 1 tablet by mouth once daily 90 tablet 0   • fenofibrate (TRICOR) 145 MG tablet Take 1 tablet by mouth once daily 90 tablet 0   • fluconazole (Diflucan) 150 MG tablet Take one tablet and repeat in 72 hours 2 tablet 5   • fluocinolone (SYNALAR) 0.01 % external solution Apply  topically to the appropriate area as directed 2 (Two) Times a Day. 60 mL 2   • FLUoxetine (PROzac) 20 MG capsule Take 1 capsule by mouth once daily 90 capsule 0   • glucose monitor monitoring kit 1 each As Needed  (test as directed). accu check avia plus meter 1 each 0   • metFORMIN (GLUCOPHAGE) 1000 MG tablet Take 1 tablet by mouth twice daily 180 tablet 0   • metoprolol succinate XL (TOPROL-XL) 100 MG 24 hr tablet TAKE 1 TABLET BY MOUTH IN THE MORNING 90 tablet 0   • montelukast (Singulair) 10 MG tablet Take 1 tablet by mouth Every Night. 90 tablet 3   • nystatin (MYCOSTATIN) 073602 UNIT/GM powder Apply  topically to the appropriate area as directed 4 (Four) Times a Day. 45 g 1   • cyclobenzaprine (FLEXERIL) 10 MG tablet Take 1 tablet by mouth 3 (Three) Times a Day As Needed for Muscle Spasms. 90 tablet 0     Current Facility-Administered Medications on File Prior to Visit   Medication Dose Route Frequency Provider Last Rate Last Admin   • Chlorhexidine Gluconate 2 % pads 2 each  2 pad Apply externally BID Emanuel Jack MD           Past Medical History:   Diagnosis Date   • Allergic rhinitis    • Asthma    • Bilateral carpal tunnel syndrome    • Endometriosis    • Fractures    • Herpes zoster without complication    • Hyperlipidemia    • Hypertension     ON TOPROL for a diagnosis of MVP but was later determined no MVp and MD decided not to stop since she has been on a while. lisinopril is prescribed for diabetes   • CAMEJO (nonalcoholic steatohepatitis)    • Primary osteoarthritis of right knee 10/06/2020   • Psoriasis    • Renal insufficiency    • Sleep apnea     USES C-PAP   • Vitamin D deficiency        Family History   Problem Relation Age of Onset   • Vaginal cancer Mother    • Lung cancer Mother    • Arthritis Mother    • Diabetes Mother            • Cancer Mother         Lung   • Coronary artery disease Father    • Arthritis Father    • Kidney cancer Father    • Cancer Father         Kidney   • Coronary artery disease Brother    • Asthma Sister    • Osteoporosis Sister    • Osteoporosis Maternal Grandmother            • Osteoporosis Sister         Being treated   • Malig Hyperthermia Neg Hx         Social History     Socioeconomic History   • Marital status:    Tobacco Use   • Smoking status: Never Smoker   • Smokeless tobacco: Never Used   • Tobacco comment: lots of second hand smoke during childhood   Vaping Use   • Vaping Use: Never used   Substance and Sexual Activity   • Alcohol use: Yes     Alcohol/week: 0.0 standard drinks     Comment: maybe have 1 drink every couple of months   • Drug use: Never   • Sexual activity: Defer       Past Surgical History:   Procedure Laterality Date   • APPENDECTOMY     • BACK SURGERY  1982   • CATARACT EXTRACTION, BILATERAL Bilateral 2015   • CHOLECYSTECTOMY  2000'S   • COLONOSCOPY     • EYE SURGERY     • HYSTERECTOMY  1982   • JOINT REPLACEMENT     • KNEE ARTHROPLASTY UNICOMPARTMENTAL Left 6/1/2018    Procedure: KNEE ARTHROPLASTY UNICOMPARTMENTAL;  Surgeon: Emanuel Jack MD;  Location: [facility];  Service: Orthopedics   • LUMBAR DISCECTOMY  1982   • TIBIA FASCIOTOMY Left 2006   • TOTAL KNEE ARTHROPLASTY Right 12/18/2020    Procedure: TOTAL KNEE ARTHROPLASTY;  Surgeon: Emanuel Jack MD;  Location: [facility];  Service: Orthopedics;  Laterality: Right;   • TUBAL ABDOMINAL LIGATION Bilateral 1981         The following portions of the patient's history were reviewed and updated as appropriate: problem list, allergies, current medications, past medical history, past family history, past social history and past surgical history.    ROS     See HPI    Immunization History   Administered Date(s) Administered   • COVID-19 (PFIZER) PURPLE CAP 03/20/2021, 04/10/2021, 12/18/2021   • Flu Vaccine Quad PF >18YRS 09/22/2016, 12/12/2017   • FluMist 2-49yrs 10/02/2013, 10/01/2014, 10/07/2015   • Hepatitis A 05/12/2018, 12/04/2018   • Influenza, Unspecified 12/04/2018   • Pneumococcal Conjugate 13-Valent (PCV13) 06/04/2021   • Pneumococcal Polysaccharide (PPSV23) 04/17/2014   • Td 10/02/2005   • Tdap 04/17/2014, 09/15/2019   • Zostavax 08/24/2012       Objective  "  Vitals:    03/08/22 1134 03/08/22 1207   BP:  116/70   Temp: 96.9 °F (36.1 °C)    Weight: 94.8 kg (209 lb)    Height: 172.7 cm (67.99\")      Body mass index is 31.79 kg/m².  Physical Exam  Vitals reviewed.   Constitutional:       Appearance: She is well-developed.   HENT:      Head: Normocephalic and atraumatic.   Neck:      Vascular: No carotid bruit.   Cardiovascular:      Rate and Rhythm: Normal rate and regular rhythm.      Heart sounds: Normal heart sounds, S1 normal and S2 normal.   Pulmonary:      Effort: Pulmonary effort is normal.      Breath sounds: Normal breath sounds.   Skin:     General: Skin is warm.   Neurological:      Mental Status: She is alert.   Psychiatric:         Behavior: Behavior normal.           Assessment/Plan   Diagnoses and all orders for this visit:    1. Screening mammogram, encounter for (Primary)  -     Mammo Screening Bilateral With CAD    2. Type 2 diabetes mellitus without complication, without long-term current use of insulin (HCC)  -     Comprehensive Metabolic Panel; Future  -     Lipid Panel With / Chol / HDL Ratio; Future  -     Hemoglobin A1c; Future    3. CAMEJO (nonalcoholic steatohepatitis)    4. Acquired hypothyroidism  -     TSH; Future    5. Primary hypertension    6. Mixed hyperlipidemia    7. Vitamin D deficiency  -     Vitamin D 25 Hydroxy; Future    Ate 4 hours ago, expecting her lipids to be slightly elevated. Really needs blood work today. Will get A1c and adjust medication accordingly. BP is controlled.     Return in 6 months (on 9/8/2022) for Lab Today, Lab Appt Before [facility], Medicare Wellness.           "

## 2022-03-09 DIAGNOSIS — I10 ESSENTIAL HYPERTENSION: ICD-10-CM

## 2022-03-09 RX ORDER — GLIPIZIDE 5 MG/1
5 TABLET, FILM COATED, EXTENDED RELEASE ORAL DAILY
Qty: 90 TABLET | Refills: 1 | Status: SHIPPED | OUTPATIENT
Start: 2022-03-09 | End: 2022-03-10 | Stop reason: SDUPTHER

## 2022-03-09 RX ORDER — LISINOPRIL 5 MG/1
TABLET ORAL
Qty: 90 TABLET | Refills: 1 | Status: SHIPPED | OUTPATIENT
Start: 2022-03-09 | End: 2022-08-24

## 2022-03-10 DIAGNOSIS — E78.2 MIXED HYPERLIPIDEMIA: ICD-10-CM

## 2022-03-10 DIAGNOSIS — E11.9 TYPE 2 DIABETES MELLITUS WITHOUT COMPLICATION, WITHOUT LONG-TERM CURRENT USE OF INSULIN: Primary | ICD-10-CM

## 2022-03-10 DIAGNOSIS — E03.9 ACQUIRED HYPOTHYROIDISM: Primary | ICD-10-CM

## 2022-03-10 RX ORDER — GLIPIZIDE 5 MG/1
5 TABLET, FILM COATED, EXTENDED RELEASE ORAL DAILY
Qty: 90 TABLET | Refills: 1 | Status: SHIPPED | OUTPATIENT
Start: 2022-03-10 | End: 2022-08-24

## 2022-03-10 RX ORDER — ATORVASTATIN CALCIUM 40 MG/1
40 TABLET, FILM COATED ORAL DAILY
Qty: 90 TABLET | Refills: 1 | Status: SHIPPED | OUTPATIENT
Start: 2022-03-10 | End: 2022-03-17 | Stop reason: SDUPTHER

## 2022-03-17 DIAGNOSIS — E78.2 MIXED HYPERLIPIDEMIA: ICD-10-CM

## 2022-03-17 RX ORDER — ATORVASTATIN CALCIUM 40 MG/1
40 TABLET, FILM COATED ORAL DAILY
Qty: 90 TABLET | Refills: 1 | Status: SHIPPED | OUTPATIENT
Start: 2022-03-17 | End: 2022-11-28

## 2022-04-18 DIAGNOSIS — I10 ESSENTIAL HYPERTENSION: ICD-10-CM

## 2022-04-18 DIAGNOSIS — E78.2 MIXED HYPERLIPIDEMIA: ICD-10-CM

## 2022-04-18 RX ORDER — FENOFIBRATE 145 MG/1
TABLET, COATED ORAL
Qty: 90 TABLET | Refills: 0 | Status: SHIPPED | OUTPATIENT
Start: 2022-04-18 | End: 2022-09-09

## 2022-04-18 RX ORDER — METOPROLOL SUCCINATE 100 MG/1
TABLET, EXTENDED RELEASE ORAL
Qty: 90 TABLET | Refills: 0 | Status: SHIPPED | OUTPATIENT
Start: 2022-04-18 | End: 2022-11-28

## 2022-04-22 DIAGNOSIS — E03.9 ACQUIRED HYPOTHYROIDISM: ICD-10-CM

## 2022-04-22 RX ORDER — LEVOTHYROXINE SODIUM 88 UG/1
TABLET ORAL
Qty: 90 TABLET | Refills: 0 | Status: SHIPPED | OUTPATIENT
Start: 2022-04-22 | End: 2022-08-24

## 2022-04-23 DIAGNOSIS — F41.9 ANXIETY: ICD-10-CM

## 2022-04-25 RX ORDER — FLUOXETINE HYDROCHLORIDE 20 MG/1
CAPSULE ORAL
Qty: 90 CAPSULE | Refills: 0 | Status: SHIPPED | OUTPATIENT
Start: 2022-04-25 | End: 2022-09-09

## 2022-05-03 DIAGNOSIS — E11.9 TYPE 2 DIABETES MELLITUS WITHOUT COMPLICATION, WITHOUT LONG-TERM CURRENT USE OF INSULIN: ICD-10-CM

## 2022-06-04 DIAGNOSIS — E11.9 TYPE 2 DIABETES MELLITUS WITHOUT COMPLICATION, WITHOUT LONG-TERM CURRENT USE OF INSULIN: ICD-10-CM

## 2022-06-14 ENCOUNTER — HOSPITAL ENCOUNTER (OUTPATIENT)
Dept: MAMMOGRAPHY | Facility: HOSPITAL | Age: 67
Discharge: HOME | End: 2022-06-14
Admitting: PHYSICIAN ASSISTANT

## 2022-06-14 PROCEDURE — 77067 SCR MAMMO BI INCL CAD: CPT

## 2022-06-14 PROCEDURE — 77063 BREAST TOMOSYNTHESIS BI: CPT

## 2022-07-13 NOTE — TELEPHONE ENCOUNTER
Pt called and stated that she went to the ER yesterday for chest pain, after work-up, decided that she did not have a heart attack, but not sure that it isn't possibly shingles. Had a fever the first part of the week that is subsiding, did not have a rash at all.     Was given Nitroglycerin that helped her chest pain at the hospital.     Was worried that she possibly is having a reaction to the new script Jardiance.          Initial (On Arrival)

## 2022-08-24 DIAGNOSIS — E03.9 ACQUIRED HYPOTHYROIDISM: ICD-10-CM

## 2022-08-24 DIAGNOSIS — E11.9 TYPE 2 DIABETES MELLITUS WITHOUT COMPLICATION, WITHOUT LONG-TERM CURRENT USE OF INSULIN: ICD-10-CM

## 2022-08-24 DIAGNOSIS — I10 ESSENTIAL HYPERTENSION: ICD-10-CM

## 2022-08-24 RX ORDER — GLIPIZIDE 5 MG/1
TABLET, FILM COATED, EXTENDED RELEASE ORAL
Qty: 90 TABLET | Refills: 0 | Status: SHIPPED | OUTPATIENT
Start: 2022-08-24 | End: 2022-11-28

## 2022-08-24 RX ORDER — LISINOPRIL 5 MG/1
TABLET ORAL
Qty: 90 TABLET | Refills: 0 | Status: SHIPPED | OUTPATIENT
Start: 2022-08-24 | End: 2022-11-28

## 2022-08-24 RX ORDER — LEVOTHYROXINE SODIUM 88 UG/1
TABLET ORAL
Qty: 90 TABLET | Refills: 0 | Status: SHIPPED | OUTPATIENT
Start: 2022-08-24 | End: 2022-11-08

## 2022-09-09 DIAGNOSIS — E78.2 MIXED HYPERLIPIDEMIA: ICD-10-CM

## 2022-09-09 DIAGNOSIS — F41.9 ANXIETY: ICD-10-CM

## 2022-09-09 RX ORDER — FLUOXETINE HYDROCHLORIDE 20 MG/1
CAPSULE ORAL
Qty: 90 CAPSULE | Refills: 0 | Status: SHIPPED | OUTPATIENT
Start: 2022-09-09 | End: 2022-09-16

## 2022-09-09 RX ORDER — FENOFIBRATE 145 MG/1
TABLET, COATED ORAL
Qty: 90 TABLET | Refills: 0 | Status: SHIPPED | OUTPATIENT
Start: 2022-09-09 | End: 2022-11-28

## 2022-09-10 LAB
ALBUMIN SERPL-MCNC: 4.7 G/DL (ref 3.5–5.2)
ALBUMIN/GLOB SERPL: 2 G/DL
ALP SERPL-CCNC: 78 U/L (ref 39–117)
ALT SERPL-CCNC: 45 U/L (ref 1–33)
AST SERPL-CCNC: 52 U/L (ref 1–32)
BILIRUB SERPL-MCNC: 0.7 MG/DL (ref 0–1.2)
BUN SERPL-MCNC: 12 MG/DL (ref 8–23)
BUN/CREAT SERPL: 12.9 (ref 7–25)
CALCIUM SERPL-MCNC: 10 MG/DL (ref 8.6–10.5)
CHLORIDE SERPL-SCNC: 102 MMOL/L (ref 98–107)
CHOLEST SERPL-MCNC: 132 MG/DL (ref 0–200)
CHOLEST/HDLC SERPL: 3.88 {RATIO}
CO2 SERPL-SCNC: 26 MMOL/L (ref 22–29)
CREAT SERPL-MCNC: 0.93 MG/DL (ref 0.57–1)
EGFRCR-CYS SERPLBLD CKD-EPI 2021: 67.9 ML/MIN/1.73
GLOBULIN SER CALC-MCNC: 2.3 GM/DL
GLUCOSE SERPL-MCNC: 120 MG/DL (ref 65–99)
HBA1C MFR BLD: 6.6 % (ref 4.8–5.6)
HDLC SERPL-MCNC: 34 MG/DL (ref 40–60)
LDLC SERPL CALC-MCNC: 66 MG/DL (ref 0–100)
POTASSIUM SERPL-SCNC: 5 MMOL/L (ref 3.5–5.2)
PROT SERPL-MCNC: 7 G/DL (ref 6–8.5)
SODIUM SERPL-SCNC: 138 MMOL/L (ref 136–145)
TRIGL SERPL-MCNC: 189 MG/DL (ref 0–150)
VLDLC SERPL CALC-MCNC: 32 MG/DL (ref 5–40)

## 2022-09-16 ENCOUNTER — OFFICE VISIT (OUTPATIENT)
Dept: INTERNAL MEDICINE | Facility: CLINIC | Age: 67
End: 2022-09-16

## 2022-09-16 VITALS
TEMPERATURE: 97.8 F | OXYGEN SATURATION: 98 % | SYSTOLIC BLOOD PRESSURE: 126 MMHG | HEIGHT: 68 IN | HEART RATE: 82 BPM | DIASTOLIC BLOOD PRESSURE: 80 MMHG | WEIGHT: 210 LBS | BODY MASS INDEX: 31.83 KG/M2

## 2022-09-16 DIAGNOSIS — Z00.00 MEDICARE ANNUAL WELLNESS VISIT, SUBSEQUENT: ICD-10-CM

## 2022-09-16 DIAGNOSIS — I10 PRIMARY HYPERTENSION: ICD-10-CM

## 2022-09-16 DIAGNOSIS — E78.2 MIXED HYPERLIPIDEMIA: Primary | ICD-10-CM

## 2022-09-16 DIAGNOSIS — K75.81 NASH (NONALCOHOLIC STEATOHEPATITIS): ICD-10-CM

## 2022-09-16 DIAGNOSIS — E03.9 ACQUIRED HYPOTHYROIDISM: ICD-10-CM

## 2022-09-16 DIAGNOSIS — E11.9 TYPE 2 DIABETES MELLITUS WITHOUT COMPLICATION, WITHOUT LONG-TERM CURRENT USE OF INSULIN: ICD-10-CM

## 2022-09-16 PROCEDURE — 1170F FXNL STATUS ASSESSED: CPT | Performed by: PHYSICIAN ASSISTANT

## 2022-09-16 PROCEDURE — 1160F RVW MEDS BY RX/DR IN RCRD: CPT | Performed by: PHYSICIAN ASSISTANT

## 2022-09-16 PROCEDURE — G0402 INITIAL PREVENTIVE EXAM: HCPCS | Performed by: PHYSICIAN ASSISTANT

## 2022-09-16 RX ORDER — SPIRONOLACTONE 50 MG/1
50 TABLET, FILM COATED ORAL DAILY
Qty: 90 TABLET | Refills: 0 | Status: SHIPPED | OUTPATIENT
Start: 2022-09-16 | End: 2022-11-24 | Stop reason: SDUPTHER

## 2022-09-16 NOTE — PROGRESS NOTES
The ABCs of the Annual Wellness Visit  Subsequent Medicare Wellness Visit    Chief Complaint   Patient presents with   • Medicare Wellness-subsequent      Subjective    History of Present Illness:  Namrata Luz is a 66 y.o. female who presents for a Subsequent Medicare Wellness Visit. Have diarrhea frequently, has been going for quite a while. Has occasional right flank pain. Has been on metformin 1000 mg BID for many years, never tried decreasing the dose. Does have CAMEJO, has been many years. Never had a fibroscan. Has no CP or SOA. Stopped her Prozac and her mood is great, no anxiety symptoms. Having a lot of facial hair growing over the last several months.     The following portions of the patient's history were reviewed and   updated as appropriate: allergies, current medications, past family history, past medical history, past social history, past surgical history and problem list.    Compared to one year ago, the patient feels her physical   health is better.    Compared to one year ago, the patient feels her mental   health is better.    Recent Hospitalizations:  She was not admitted to the hospital during the last year.       Current Medical Providers:  Patient Care Team:  Sujata Breen PA-C as PCP - General (Physician Assistant)  Emanuel Jack MD as Surgeon (Orthopedic Surgery)  Charlie Alegria MD as Consulting Physician (Pulmonary Disease)    Outpatient Medications Prior to Visit   Medication Sig Dispense Refill   • Accu-Chek Tavia Plus test strip USE AS DIRECTED TO TEST BLOOD SUGAR once DAILY 100 each 1   • Accu-Chek Softclix Lancets lancets USE AS DIRECTED TO TEST BLOOD SUGAR DAILY 100 each 1   • atorvastatin (LIPITOR) 40 MG tablet Take 1 tablet by mouth Daily. as directed 90 tablet 1   • clobetasol (TEMOVATE) 0.05 % external solution Apply 1 application topically to the appropriate area as directed As Needed (for psoriasis). 50 mL 2   • empagliflozin (Jardiance) 25 MG tablet tablet Take 1  tablet by mouth Daily. 30 tablet 3   • Euthyrox 88 MCG tablet Take 1 tablet by mouth once daily 90 tablet 0   • fenofibrate (TRICOR) 145 MG tablet Take 1 tablet by mouth once daily 90 tablet 0   • fluconazole (Diflucan) 150 MG tablet Take one tablet and repeat in 72 hours 2 tablet 5   • fluocinolone (SYNALAR) 0.01 % external solution Apply  topically to the appropriate area as directed 2 (Two) Times a Day. 60 mL 2   • glipizide (GLUCOTROL XL) 5 MG ER tablet Take 1 tablet by mouth once daily 90 tablet 0   • glucose monitor monitoring kit 1 each As Needed (test as directed). accu check avia plus meter 1 each 0   • lisinopril (PRINIVIL,ZESTRIL) 5 MG tablet Take 1 tablet by mouth once daily 90 tablet 0   • metFORMIN (GLUCOPHAGE) 1000 MG tablet Take 1 tablet by mouth twice daily 180 tablet 0   • metoprolol succinate XL (TOPROL-XL) 100 MG 24 hr tablet TAKE 1 TABLET BY MOUTH IN THE MORNING 90 tablet 0   • montelukast (Singulair) 10 MG tablet Take 1 tablet by mouth Every Night. 90 tablet 3   • nystatin (MYCOSTATIN) 257848 UNIT/GM powder Apply  topically to the appropriate area as directed 4 (Four) Times a Day. 45 g 1   • cyclobenzaprine (FLEXERIL) 10 MG tablet Take 1 tablet by mouth 3 (Three) Times a Day As Needed for Muscle Spasms. 90 tablet 0   • FLUoxetine (PROzac) 20 MG capsule Take 1 capsule by mouth once daily 90 capsule 0     Facility-Administered Medications Prior to Visit   Medication Dose Route Frequency Provider Last Rate Last Admin   • Chlorhexidine Gluconate 2 % pads 2 each  2 pad Apply externally BID Emanuel Jack MD           No opioid medication identified on active medication list. I have reviewed chart for other potential  high risk medication/s and harmful drug interactions in the elderly.          Aspirin is not on active medication list.  Aspirin use is not indicated based on review of current medical condition/s. Risk of harm outweighs potential benefits.  .    Patient Active Problem List   Diagnosis  "  • Asthma   • Mixed anxiety depressive disorder   • Hyperlipidemia   • Hypertension   • Hypothyroidism   • Lung nodule, solitary   • Psoriasis   • Vitamin D deficiency   • Type 2 diabetes mellitus without complication (HCC)   • Chronic pain of left knee   • CAMEJO (nonalcoholic steatohepatitis)   • Allergic rhinitis   • Bilateral carpal tunnel syndrome     Advance Care Planning  Advance Directive is on file.  ACP discussion was held with the patient during this visit. Patient has an advance directive in EMR which is still valid.           Objective    Vitals:    09/16/22 1018 09/16/22 1058   BP:  126/80   Pulse: 82    Temp: 97.8 °F (36.6 °C)    SpO2: 98%    Weight: 95.3 kg (210 lb)    Height: 172.7 cm (67.99\")      Estimated body mass index is 31.94 kg/m² as calculated from the following:    Height as of this encounter: 172.7 cm (67.99\").    Weight as of this encounter: 95.3 kg (210 lb).    BMI is >= 30 and <35. (Class 1 Obesity). The following options were offered after discussion;: exercise counseling/recommendations and nutrition counseling/recommendations      Does the patient have evidence of cognitive impairment? No    Physical Exam  Cardiovascular:      Pulses:           Dorsalis pedis pulses are 2+ on the right side and 2+ on the left side.        Posterior tibial pulses are 2+ on the right side and 2+ on the left side.   Feet:      Right foot:      Protective Sensation: 6 sites tested. 6 sites sensed.      Left foot:      Protective Sensation: 6 sites tested. 6 sites sensed.       Lab Results   Component Value Date    CHLPL 132 09/09/2022    TRIG 189 (H) 09/09/2022    HDL 34 (L) 09/09/2022    LDL 66 09/09/2022    VLDL 32 09/09/2022    HGBA1C 6.60 (H) 09/09/2022            HEALTH RISK ASSESSMENT    Smoking Status:  Social History     Tobacco Use   Smoking Status Never Smoker   Smokeless Tobacco Never Used   Tobacco Comment    lots of second hand smoke during childhood     Alcohol Consumption:  Social History "     Substance and Sexual Activity   Alcohol Use Yes   • Alcohol/week: 0.0 standard drinks    Comment: maybe have 1 drink every couple of months     Fall Risk Screen:    YURY Fall Risk Assessment was completed, and patient is at LOW risk for falls.Assessment completed on:9/16/2022    Depression Screening:  PHQ-2/PHQ-9 Depression Screening 9/16/2022   Little Interest or Pleasure in Doing Things 0-->not at all   Feeling Down, Depressed or Hopeless 0-->not at all   PHQ-9: Brief Depression Severity Measure Score 0       Health Habits and Functional and Cognitive Screening:  Functional & Cognitive Status 9/16/2022   Do you have difficulty preparing food and eating? No   Do you have difficulty bathing yourself, getting dressed or grooming yourself? No   Do you have difficulty using the toilet? No   Do you have difficulty moving around from place to place? No   Do you have trouble with steps or getting out of a bed or a chair? No   Current Diet Well Balanced Diet   Dental Exam Up to date   Eye Exam Up to date   Exercise (times per week) 2 times per week   Current Exercises Include Walking   Do you need help using the phone?  No   Are you deaf or do you have serious difficulty hearing?  No   Do you need help with transportation? No   Do you need help shopping? No   Do you need help preparing meals?  No   Do you need help with housework?  No   Do you need help with laundry? No   Do you need help taking your medications? No   Do you need help managing money? No   Do you ever drive or ride in a car without wearing a seat belt? No       Age-appropriate Screening Schedule:  Refer to the list below for future screening recommendations based on patient's age, sex and/or medical conditions. Orders for these recommended tests are listed in the plan section. The patient has been provided with a written plan.    Health Maintenance   Topic Date Due   • DIABETIC EYE EXAM  02/13/2021   • DIABETIC FOOT EXAM  06/04/2022   • INFLUENZA  VACCINE  10/01/2022   • ZOSTER VACCINE (3 of 3) 10/01/2022   • URINE MICROALBUMIN  03/08/2023   • HEMOGLOBIN A1C  03/09/2023   • MAMMOGRAM  06/14/2023   • DXA SCAN  08/18/2023   • LIPID PANEL  09/09/2023   • TDAP/TD VACCINES (4 - Td or Tdap) 09/15/2029   • PAP SMEAR  Discontinued              Assessment & Plan   CMS Preventative Services Quick Reference  Risk Factors Identified During Encounter  Immunizations Discussed/Encouraged (specific Immunizations; Influenza  The above risks/problems have been discussed with the patient.  Follow up actions/plans if indicated are seen below in the Assessment/Plan Section.  Pertinent information has been shared with the patient in the After Visit Summary.      Diagnoses and all orders for this visit:    1. Mixed hyperlipidemia (Primary)    2. Primary hypertension  -     Basic Metabolic Panel; Future    3. Acquired hypothyroidism    4. CAMEJO (nonalcoholic steatohepatitis)    5. Type 2 diabetes mellitus without complication, without long-term current use of insulin (HCC)    6. Medicare annual wellness visit, subsequent    Other orders  -     spironolactone (Aldactone) 50 MG tablet; Take 1 tablet by mouth Daily.  Dispense: 90 tablet; Refill: 0    1. HTN: Bp wel controlled.     2. Hypothyroidism: TSH therapeutic.     3. HLD: LDL to goal.     4. DM: Foot exam today, no evidence of neuropathy. Has not yet had her eye exam. A1c is great. Will have her try decreasing her Metformin from 1000 mg BID to 500 mg BID to see if helps with diarrhea symptoms. Urine micro is normal.     5. Facial hair: Start Spironolactone 50 mg daily, BMP in 1 week. FUP with me in 1 month.     6. CAMEJO: labs stable, will order fibroscan.     Pt declines flu vaccination.     Follow Up:   No follow-ups on file.     An After Visit Summary and PPPS were made available to the patient.

## 2022-11-08 ENCOUNTER — TELEPHONE (OUTPATIENT)
Dept: INTERNAL MEDICINE | Facility: CLINIC | Age: 67
End: 2022-11-08

## 2022-11-08 DIAGNOSIS — E03.9 ACQUIRED HYPOTHYROIDISM: Primary | ICD-10-CM

## 2022-11-08 RX ORDER — LEVOTHYROXINE SODIUM 0.1 MG/1
100 TABLET ORAL DAILY
Qty: 90 TABLET | Refills: 1 | Status: SHIPPED | OUTPATIENT
Start: 2022-11-08 | End: 2022-11-24 | Stop reason: SDUPTHER

## 2022-11-08 NOTE — TELEPHONE ENCOUNTER
Caller: Namrata Luz    Relationship: Self    Best call back number:     Caller requesting test results:     What test was performed: LAB    When was the test performed: WEEKS AGO    Where was the test performed: OFFICE LAB    Additional notes: PATIENT IS CALLING IN TO GET HER RESULTS FROM HER.

## 2022-11-24 DIAGNOSIS — E03.9 ACQUIRED HYPOTHYROIDISM: ICD-10-CM

## 2022-11-24 DIAGNOSIS — I10 ESSENTIAL HYPERTENSION: ICD-10-CM

## 2022-11-24 DIAGNOSIS — E78.2 MIXED HYPERLIPIDEMIA: ICD-10-CM

## 2022-11-24 DIAGNOSIS — E11.9 TYPE 2 DIABETES MELLITUS WITHOUT COMPLICATION, WITHOUT LONG-TERM CURRENT USE OF INSULIN: ICD-10-CM

## 2022-11-24 DIAGNOSIS — J30.1 NON-SEASONAL ALLERGIC RHINITIS DUE TO POLLEN: ICD-10-CM

## 2022-11-28 RX ORDER — GLIPIZIDE 5 MG/1
TABLET, FILM COATED, EXTENDED RELEASE ORAL
Qty: 90 TABLET | Refills: 3 | Status: SHIPPED | OUTPATIENT
Start: 2022-11-28 | End: 2022-11-30 | Stop reason: CLARIF

## 2022-11-28 RX ORDER — LISINOPRIL 5 MG/1
TABLET ORAL
Qty: 90 TABLET | Refills: 3 | Status: SHIPPED | OUTPATIENT
Start: 2022-11-28

## 2022-11-28 RX ORDER — METOPROLOL SUCCINATE 100 MG/1
TABLET, EXTENDED RELEASE ORAL
Qty: 90 TABLET | Refills: 0 | Status: SHIPPED | OUTPATIENT
Start: 2022-11-28 | End: 2023-02-17

## 2022-11-28 RX ORDER — SPIRONOLACTONE 50 MG/1
50 TABLET, FILM COATED ORAL DAILY
Qty: 90 TABLET | Refills: 0 | Status: SHIPPED | OUTPATIENT
Start: 2022-11-28 | End: 2023-01-17

## 2022-11-28 RX ORDER — MONTELUKAST SODIUM 10 MG/1
10 TABLET ORAL NIGHTLY
Qty: 90 TABLET | Refills: 3 | Status: SHIPPED | OUTPATIENT
Start: 2022-11-28

## 2022-11-28 RX ORDER — FENOFIBRATE 145 MG/1
TABLET, COATED ORAL
Qty: 90 TABLET | Refills: 3 | Status: SHIPPED | OUTPATIENT
Start: 2022-11-28

## 2022-11-28 RX ORDER — LEVOTHYROXINE SODIUM 0.1 MG/1
100 TABLET ORAL DAILY
Qty: 90 TABLET | Refills: 1 | Status: SHIPPED | OUTPATIENT
Start: 2022-11-28

## 2022-11-28 RX ORDER — ATORVASTATIN CALCIUM 40 MG/1
TABLET, FILM COATED ORAL
Qty: 90 TABLET | Refills: 3 | Status: SHIPPED | OUTPATIENT
Start: 2022-11-28

## 2022-11-30 DIAGNOSIS — E11.9 TYPE 2 DIABETES MELLITUS WITHOUT COMPLICATION, WITHOUT LONG-TERM CURRENT USE OF INSULIN: Primary | ICD-10-CM

## 2022-11-30 RX ORDER — GLIPIZIDE 10 MG/1
10 TABLET, FILM COATED, EXTENDED RELEASE ORAL DAILY
Qty: 90 TABLET | Refills: 3 | Status: SHIPPED | OUTPATIENT
Start: 2022-11-30

## 2023-01-17 RX ORDER — SPIRONOLACTONE 50 MG/1
TABLET, FILM COATED ORAL
Qty: 90 TABLET | Refills: 3 | Status: SHIPPED | OUTPATIENT
Start: 2023-01-17

## 2023-02-16 DIAGNOSIS — I10 ESSENTIAL HYPERTENSION: ICD-10-CM

## 2023-02-17 RX ORDER — METOPROLOL SUCCINATE 100 MG/1
TABLET, EXTENDED RELEASE ORAL
Qty: 90 TABLET | Refills: 3 | Status: SHIPPED | OUTPATIENT
Start: 2023-02-17

## 2023-04-12 DIAGNOSIS — E11.9 TYPE 2 DIABETES MELLITUS WITHOUT COMPLICATION, WITHOUT LONG-TERM CURRENT USE OF INSULIN: ICD-10-CM

## 2023-04-12 RX ORDER — EMPAGLIFLOZIN 25 MG/1
TABLET, FILM COATED ORAL
Qty: 30 TABLET | Refills: 0 | Status: SHIPPED | OUTPATIENT
Start: 2023-04-12

## 2023-04-13 DIAGNOSIS — E11.9 TYPE 2 DIABETES MELLITUS WITHOUT COMPLICATION, WITHOUT LONG-TERM CURRENT USE OF INSULIN: Primary | ICD-10-CM

## 2023-04-13 DIAGNOSIS — E78.2 MIXED HYPERLIPIDEMIA: ICD-10-CM

## 2023-04-13 DIAGNOSIS — E03.9 ACQUIRED HYPOTHYROIDISM: ICD-10-CM

## 2023-04-13 DIAGNOSIS — I10 ESSENTIAL HYPERTENSION: ICD-10-CM

## 2023-04-13 DIAGNOSIS — E11.9 TYPE 2 DIABETES MELLITUS WITHOUT COMPLICATION, WITHOUT LONG-TERM CURRENT USE OF INSULIN: ICD-10-CM

## 2023-04-15 LAB
ALBUMIN SERPL-MCNC: 4.8 G/DL (ref 3.5–5.2)
ALBUMIN/GLOB SERPL: 1.8 G/DL
ALP SERPL-CCNC: 83 U/L (ref 39–117)
ALT SERPL-CCNC: 36 U/L (ref 1–33)
AST SERPL-CCNC: 27 U/L (ref 1–32)
BILIRUB SERPL-MCNC: 0.6 MG/DL (ref 0–1.2)
BUN SERPL-MCNC: 14 MG/DL (ref 8–23)
BUN/CREAT SERPL: 14.4 (ref 7–25)
CALCIUM SERPL-MCNC: 10.1 MG/DL (ref 8.6–10.5)
CHLORIDE SERPL-SCNC: 103 MMOL/L (ref 98–107)
CHOLEST SERPL-MCNC: 146 MG/DL (ref 0–200)
CO2 SERPL-SCNC: 25.5 MMOL/L (ref 22–29)
CREAT SERPL-MCNC: 0.97 MG/DL (ref 0.57–1)
EGFRCR SERPLBLD CKD-EPI 2021: 64.2 ML/MIN/1.73
GLOBULIN SER CALC-MCNC: 2.6 GM/DL
GLUCOSE SERPL-MCNC: 121 MG/DL (ref 65–99)
HBA1C MFR BLD: 7.2 % (ref 4.8–5.6)
HDLC SERPL-MCNC: 35 MG/DL (ref 40–60)
LDLC SERPL CALC-MCNC: 80 MG/DL (ref 0–100)
POTASSIUM SERPL-SCNC: 5.4 MMOL/L (ref 3.5–5.2)
PROT SERPL-MCNC: 7.4 G/DL (ref 6–8.5)
SODIUM SERPL-SCNC: 139 MMOL/L (ref 136–145)
TRIGL SERPL-MCNC: 178 MG/DL (ref 0–150)
TSH SERPL DL<=0.005 MIU/L-ACNC: 1.55 UIU/ML (ref 0.27–4.2)
VLDLC SERPL CALC-MCNC: 31 MG/DL (ref 5–40)

## 2023-04-17 ENCOUNTER — OFFICE VISIT (OUTPATIENT)
Dept: INTERNAL MEDICINE | Facility: CLINIC | Age: 68
End: 2023-04-17
Payer: MEDICARE

## 2023-04-17 VITALS
SYSTOLIC BLOOD PRESSURE: 116 MMHG | HEIGHT: 68 IN | BODY MASS INDEX: 32.43 KG/M2 | DIASTOLIC BLOOD PRESSURE: 70 MMHG | TEMPERATURE: 97.1 F | WEIGHT: 214 LBS

## 2023-04-17 DIAGNOSIS — E78.2 MIXED HYPERLIPIDEMIA: ICD-10-CM

## 2023-04-17 DIAGNOSIS — E11.9 TYPE 2 DIABETES MELLITUS WITHOUT COMPLICATION, WITHOUT LONG-TERM CURRENT USE OF INSULIN: ICD-10-CM

## 2023-04-17 DIAGNOSIS — E87.5 HYPERKALEMIA: Primary | ICD-10-CM

## 2023-04-17 DIAGNOSIS — I10 PRIMARY HYPERTENSION: ICD-10-CM

## 2023-04-17 DIAGNOSIS — E03.9 ACQUIRED HYPOTHYROIDISM: ICD-10-CM

## 2023-04-17 NOTE — PROGRESS NOTES
Subjective   Chief Complaint   Patient presents with   • Follow-up   • Hypertension     Follow up on medication changes       History of Present Illness     Pt is here today for fup. She is doing better with GI symptoms since decreasing her Metformin. Her sugars are not much better. Doing well with the spironolactone. No CP or SOA.      Patient Active Problem List   Diagnosis   • Asthma   • Mixed anxiety depressive disorder   • Hyperlipidemia   • Hypertension   • Hypothyroidism   • Lung nodule, solitary   • Psoriasis   • Vitamin D deficiency   • Type 2 diabetes mellitus without complication   • Chronic pain of left knee   • CAMEJO (nonalcoholic steatohepatitis)   • Allergic rhinitis   • Bilateral carpal tunnel syndrome       Allergies   Allergen Reactions   • Morphine And Related Hives   • Penicillins Hives   • Morphine Hives       Current Outpatient Medications on File Prior to Visit   Medication Sig Dispense Refill   • Accu-Chek Tavia Plus test strip USE AS DIRECTED TO TEST BLOOD SUGAR once DAILY 100 each 1   • Accu-Chek Softclix Lancets lancets USE AS DIRECTED TO TEST BLOOD SUGAR DAILY 100 each 1   • atorvastatin (LIPITOR) 40 MG tablet TAKE 1 TABLET BY MOUTH ONCE DAILY AS DIRECTED 90 tablet 3   • clobetasol (TEMOVATE) 0.05 % external solution Apply 1 application topically to the appropriate area as directed As Needed (for psoriasis). 50 mL 2   • fenofibrate (TRICOR) 145 MG tablet Take 1 tablet by mouth once daily 90 tablet 3   • fluocinolone (SYNALAR) 0.01 % external solution Apply  topically to the appropriate area as directed 2 (Two) Times a Day. 60 mL 2   • glipizide (GLUCOTROL XL) 10 MG 24 hr tablet Take 1 tablet by mouth Daily. 90 tablet 3   • glucose monitor monitoring kit 1 each As Needed (test as directed). accu check avia plus meter 1 each 0   • Jardiance 25 MG tablet tablet Take 1 tablet by mouth once daily 30 tablet 0   • levothyroxine (SYNTHROID, LEVOTHROID) 100 MCG tablet Take 1 tablet by mouth Daily.  90 tablet 1   • lisinopril (PRINIVIL,ZESTRIL) 5 MG tablet Take 1 tablet by mouth once daily 90 tablet 3   • metFORMIN (GLUCOPHAGE) 500 MG tablet Take 1 tablet by mouth 2 (Two) Times a Day With Meals. 180 tablet 3   • metoprolol succinate XL (TOPROL-XL) 100 MG 24 hr tablet TAKE 1 TABLET BY MOUTH IN THE MORNING 90 tablet 3   • montelukast (Singulair) 10 MG tablet Take 1 tablet by mouth Every Night. 90 tablet 3   • nystatin (MYCOSTATIN) 649043 UNIT/GM powder Apply  topically to the appropriate area as directed 4 (Four) Times a Day. 45 g 1   • spironolactone (ALDACTONE) 50 MG tablet Take 1 tablet by mouth once daily 90 tablet 3   • [DISCONTINUED] fluconazole (Diflucan) 150 MG tablet Take one tablet and repeat in 72 hours (Patient not taking: Reported on 2023) 2 tablet 5     No current facility-administered medications on file prior to visit.       Past Medical History:   Diagnosis Date   • Allergic rhinitis    • Asthma    • Bilateral carpal tunnel syndrome    • Endometriosis    • Fractures    • Herpes zoster without complication    • Hyperlipidemia    • Hypertension     ON TOPROL for a diagnosis of MVP but was later determined no MVp and MD decided not to stop since she has been on a while. lisinopril is prescribed for diabetes   • CAMEJO (nonalcoholic steatohepatitis)    • Primary osteoarthritis of right knee 10/06/2020   • Psoriasis    • Renal insufficiency    • Sleep apnea     USES C-PAP   • Vitamin D deficiency        Family History   Problem Relation Age of Onset   • Vaginal cancer Mother    • Lung cancer Mother    • Arthritis Mother    • Diabetes Mother            • Cancer Mother         Lung   • Coronary artery disease Father    • Arthritis Father    • Kidney cancer Father    • Cancer Father         Kidney   • Coronary artery disease Brother    • Asthma Sister    • Osteoporosis Sister    • Osteoporosis Maternal Grandmother            • Osteoporosis Sister         Being treated   • Rico  Hyperthermia Neg Hx        Social History     Socioeconomic History   • Marital status:    Tobacco Use   • Smoking status: Never   • Smokeless tobacco: Never   • Tobacco comments:     lots of second hand smoke during childhood   Vaping Use   • Vaping Use: Never used   Substance and Sexual Activity   • Alcohol use: Yes     Alcohol/week: 0.0 standard drinks     Comment: maybe have 1 drink every couple of months   • Drug use: Never   • Sexual activity: Defer       Past Surgical History:   Procedure Laterality Date   • APPENDECTOMY     • BACK SURGERY  1982   • CATARACT EXTRACTION, BILATERAL Bilateral 2015   • CHOLECYSTECTOMY  2000'S   • COLONOSCOPY     • EYE SURGERY     • HYSTERECTOMY  1982   • JOINT REPLACEMENT     • KNEE ARTHROPLASTY UNICOMPARTMENTAL Left 6/1/2018    Procedure: KNEE ARTHROPLASTY UNICOMPARTMENTAL;  Surgeon: Emanuel Jack MD;  Location: [facility];  Service: Orthopedics   • LUMBAR DISCECTOMY  1982   • TIBIA FASCIOTOMY Left 2006   • TOTAL KNEE ARTHROPLASTY Right 12/18/2020    Procedure: TOTAL KNEE ARTHROPLASTY;  Surgeon: Emanuel Jack MD;  Location: [facility];  Service: Orthopedics;  Laterality: Right;   • TUBAL ABDOMINAL LIGATION Bilateral 1981         The following portions of the patient's history were reviewed and updated as appropriate: problem list, allergies, current medications, past medical history, past family history, past social history and past surgical history.    ROS     See HPI    Immunization History   Administered Date(s) Administered   • COVID-19 (PFIZER) PURPLE CAP 03/20/2021, 04/10/2021, 12/18/2021   • FluMist 2-49yrs 10/02/2013, 10/01/2014, 10/07/2015   • Fluzone Quad >6mos (Multi-dose) 09/22/2016, 12/12/2017   • Hepatitis A 05/12/2018, 12/04/2018   • Influenza Quad Vaccine (Inpatient) 12/12/2017   • Influenza, Unspecified 12/04/2018   • Pneumococcal Conjugate 13-Valent (PCV13) 06/04/2021   • Pneumococcal Polysaccharide (PPSV23) 04/17/2014   • Shingrix 08/06/2022  "  • Td 10/02/2005   • Tdap 04/17/2014, 09/15/2019   • Zostavax 08/24/2012       Objective   Vitals:    04/17/23 1522   BP: 116/70   Temp: 97.1 °F (36.2 °C)   Weight: 97.1 kg (214 lb)   Height: 172.7 cm (67.99\")     Body mass index is 32.55 kg/m².  Physical Exam  Vitals reviewed.   Constitutional:       Appearance: Normal appearance.   HENT:      Head: Normocephalic and atraumatic.   Cardiovascular:      Rate and Rhythm: Normal rate and regular rhythm.      Heart sounds: Normal heart sounds.   Neurological:      Mental Status: She is alert.           Assessment & Plan   Diagnoses and all orders for this visit:    1. Hyperkalemia (Primary)  -     Basic Metabolic Panel    2. Type 2 diabetes mellitus without complication, without long-term current use of insulin  -     Dulaglutide 0.75 MG/0.5ML solution pen-injector; Inject 0.75 mg under the skin into the appropriate area as directed 1 (One) Time Per Week.  Dispense: 2 mL; Refill: 0  -     Dulaglutide 1.5 MG/0.5ML solution pen-injector; Inject 1.5 mg under the skin into the appropriate area as directed 1 (One) Time Per Week.  Dispense: 2 mL; Refill: 0  -     Dulaglutide 3 MG/0.5ML solution pen-injector; Inject 0.5 mL under the skin into the appropriate area as directed 1 (One) Time Per Week.  Dispense: 2 mL; Refill: 0  -     Dulaglutide 4.5 MG/0.5ML solution pen-injector; Inject 0.5 mL under the skin into the appropriate area as directed 1 (One) Time Per Week.  Dispense: 2 mL; Refill: 0  -     Comprehensive Metabolic Panel; Future  -     Hemoglobin A1c; Future  -     Microalbumin / Creatinine Urine Ratio - Urine, Clean Catch; Future    3. Mixed hyperlipidemia    4. Primary hypertension    5. Acquired hypothyroidism    TSH is therapeutic. BP is controlled. Lipids are to goal. A1c is not to goal, start Trulicity and recheck in 4 mo. Potassium 5.4, recheck today    Return in about 4 months (around 8/17/2023) for Lab Today, Lab Appt Before FUP.           "

## 2023-04-18 LAB
BUN SERPL-MCNC: 17 MG/DL (ref 8–23)
BUN/CREAT SERPL: 17.3 (ref 7–25)
CALCIUM SERPL-MCNC: 10.2 MG/DL (ref 8.6–10.5)
CHLORIDE SERPL-SCNC: 104 MMOL/L (ref 98–107)
CO2 SERPL-SCNC: 26.8 MMOL/L (ref 22–29)
CREAT SERPL-MCNC: 0.98 MG/DL (ref 0.57–1)
EGFRCR SERPLBLD CKD-EPI 2021: 63.4 ML/MIN/1.73
GLUCOSE SERPL-MCNC: 158 MG/DL (ref 65–99)
POTASSIUM SERPL-SCNC: 4.4 MMOL/L (ref 3.5–5.2)
SODIUM SERPL-SCNC: 140 MMOL/L (ref 136–145)

## 2023-04-22 DIAGNOSIS — E11.9 TYPE 2 DIABETES MELLITUS WITHOUT COMPLICATION, WITHOUT LONG-TERM CURRENT USE OF INSULIN: ICD-10-CM

## 2023-04-24 RX ORDER — LANCETS
EACH MISCELLANEOUS
Qty: 100 EACH | Refills: 1 | Status: SHIPPED | OUTPATIENT
Start: 2023-04-24

## 2023-04-24 RX ORDER — BLOOD SUGAR DIAGNOSTIC
STRIP MISCELLANEOUS
Qty: 100 EACH | Refills: 1 | Status: SHIPPED | OUTPATIENT
Start: 2023-04-24

## 2023-05-04 ENCOUNTER — TRANSCRIBE ORDERS (OUTPATIENT)
Dept: ADMINISTRATIVE | Facility: HOSPITAL | Age: 68
End: 2023-05-04
Payer: MEDICARE

## 2023-05-04 DIAGNOSIS — Z12.39 SCREENING BREAST EXAMINATION: Primary | ICD-10-CM

## 2023-05-04 DIAGNOSIS — Z12.31 ENCOUNTER FOR SCREENING MAMMOGRAM FOR MALIGNANT NEOPLASM OF BREAST: ICD-10-CM

## 2023-05-25 DIAGNOSIS — E11.9 TYPE 2 DIABETES MELLITUS WITHOUT COMPLICATION, WITHOUT LONG-TERM CURRENT USE OF INSULIN: ICD-10-CM

## 2023-05-25 RX ORDER — DULAGLUTIDE 1.5 MG/.5ML
INJECTION, SOLUTION SUBCUTANEOUS
Qty: 4 ML | Refills: 0 | Status: SHIPPED | OUTPATIENT
Start: 2023-05-25

## 2023-06-12 DIAGNOSIS — E11.9 TYPE 2 DIABETES MELLITUS WITHOUT COMPLICATION, WITHOUT LONG-TERM CURRENT USE OF INSULIN: ICD-10-CM

## 2023-06-12 RX ORDER — EMPAGLIFLOZIN 25 MG/1
TABLET, FILM COATED ORAL
Qty: 30 TABLET | Refills: 3 | Status: SHIPPED | OUTPATIENT
Start: 2023-06-12

## 2023-06-16 ENCOUNTER — HOSPITAL ENCOUNTER (OUTPATIENT)
Dept: MAMMOGRAPHY | Facility: HOSPITAL | Age: 68
Discharge: HOME | End: 2023-06-16
Admitting: PHYSICIAN ASSISTANT
Payer: MEDICARE

## 2023-06-16 DIAGNOSIS — Z12.39 SCREENING BREAST EXAMINATION: ICD-10-CM

## 2023-06-16 DIAGNOSIS — Z12.31 ENCOUNTER FOR SCREENING MAMMOGRAM FOR MALIGNANT NEOPLASM OF BREAST: ICD-10-CM

## 2023-06-16 DIAGNOSIS — R92.8 ABNORMAL MAMMOGRAM OF RIGHT BREAST: Primary | ICD-10-CM

## 2023-06-16 PROCEDURE — 77063 BREAST TOMOSYNTHESIS BI: CPT

## 2023-06-16 PROCEDURE — 77067 SCR MAMMO BI INCL CAD: CPT

## 2023-07-13 NOTE — TELEPHONE ENCOUNTER
I would recommend staying on her meds.  See if pain resolves and if not by Monday given us a call.  If she develops a rash, call.     See Anesthesia documentation.

## 2023-07-25 DIAGNOSIS — E11.9 TYPE 2 DIABETES MELLITUS WITHOUT COMPLICATION, WITHOUT LONG-TERM CURRENT USE OF INSULIN: Primary | ICD-10-CM

## 2023-07-27 ENCOUNTER — HOSPITAL ENCOUNTER (OUTPATIENT)
Dept: ULTRASOUND IMAGING | Facility: HOSPITAL | Age: 68
Discharge: HOME | End: 2023-07-27
Payer: MEDICARE

## 2023-07-27 ENCOUNTER — HOSPITAL ENCOUNTER (OUTPATIENT)
Dept: MAMMOGRAPHY | Facility: HOSPITAL | Age: 68
Discharge: HOME | End: 2023-07-27
Payer: MEDICARE

## 2023-07-27 DIAGNOSIS — R92.8 ABNORMAL MAMMOGRAM OF RIGHT BREAST: ICD-10-CM

## 2023-07-27 PROCEDURE — G0279 TOMOSYNTHESIS, MAMMO: HCPCS

## 2023-07-27 PROCEDURE — 77065 DX MAMMO INCL CAD UNI: CPT

## 2023-07-27 PROCEDURE — 76642 ULTRASOUND BREAST LIMITED: CPT

## 2023-08-16 DIAGNOSIS — E11.9 TYPE 2 DIABETES MELLITUS WITHOUT COMPLICATION, WITHOUT LONG-TERM CURRENT USE OF INSULIN: ICD-10-CM

## 2023-08-16 RX ORDER — DULAGLUTIDE 3 MG/.5ML
INJECTION, SOLUTION SUBCUTANEOUS
Qty: 4 ML | Refills: 0 | Status: SHIPPED | OUTPATIENT
Start: 2023-08-16 | End: 2023-08-18

## 2023-08-18 ENCOUNTER — OFFICE VISIT (OUTPATIENT)
Dept: INTERNAL MEDICINE | Facility: CLINIC | Age: 68
End: 2023-08-18
Payer: MEDICARE

## 2023-08-18 DIAGNOSIS — E11.9 TYPE 2 DIABETES MELLITUS WITHOUT COMPLICATION, WITHOUT LONG-TERM CURRENT USE OF INSULIN: ICD-10-CM

## 2023-08-18 DIAGNOSIS — I10 PRIMARY HYPERTENSION: Primary | ICD-10-CM

## 2023-08-18 DIAGNOSIS — K75.81 NASH (NONALCOHOLIC STEATOHEPATITIS): ICD-10-CM

## 2023-08-18 NOTE — PROGRESS NOTES
Subjective   Chief Complaint   Patient presents with    Diabetes           Pt is here today for fup on her DM. She is tolerating the Trulicity well. No major side effects. Has had a lot of anxiety with family lately, overall managing well. Does not want any medication at this time. No CP or SOA.      Patient Active Problem List   Diagnosis    Asthma    Mixed anxiety depressive disorder    Hyperlipidemia    Hypertension    Hypothyroidism    Lung nodule, solitary    Psoriasis    Vitamin D deficiency    Type 2 diabetes mellitus without complication    Chronic pain of left knee    CAMEJO (nonalcoholic steatohepatitis)    Allergic rhinitis    Bilateral carpal tunnel syndrome       Allergies   Allergen Reactions    Morphine And Related Hives    Penicillins Hives    Morphine Hives       Current Outpatient Medications on File Prior to Visit   Medication Sig Dispense Refill    Accu-Chek Tavia Plus test strip USE AS DIRECTED TO TEST BLOOD SUGAR once DAILY 100 each 1    Accu-Chek Softclix Lancets lancets USE AS DIRECTED TO TEST BLOOD SUGAR DAILY 100 each 1    atorvastatin (LIPITOR) 40 MG tablet TAKE 1 TABLET BY MOUTH ONCE DAILY AS DIRECTED 90 tablet 3    clobetasol (TEMOVATE) 0.05 % external solution Apply 1 application topically to the appropriate area as directed As Needed (for psoriasis). 50 mL 2    empagliflozin (Jardiance) 25 MG tablet tablet Take 1 tablet by mouth once daily 30 tablet 3    fenofibrate (TRICOR) 145 MG tablet Take 1 tablet by mouth once daily 90 tablet 3    fluocinolone (SYNALAR) 0.01 % external solution Apply  topically to the appropriate area as directed 2 (Two) Times a Day. 60 mL 2    glipizide (GLUCOTROL XL) 10 MG 24 hr tablet Take 1 tablet by mouth Daily. 90 tablet 3    glucose monitor monitoring kit 1 each As Needed (test as directed). accu check avia plus meter 1 each 0    levothyroxine (SYNTHROID, LEVOTHROID) 100 MCG tablet Take 1 tablet by mouth Daily. 90 tablet 1    lisinopril (PRINIVIL,ZESTRIL) 5  MG tablet Take 1 tablet by mouth once daily 90 tablet 3    metFORMIN (GLUCOPHAGE) 500 MG tablet Take 1 tablet by mouth 2 (Two) Times a Day With Meals. 180 tablet 3    metoprolol succinate XL (TOPROL-XL) 100 MG 24 hr tablet TAKE 1 TABLET BY MOUTH IN THE MORNING 90 tablet 3    montelukast (Singulair) 10 MG tablet Take 1 tablet by mouth Every Night. 90 tablet 3    nystatin (MYCOSTATIN) 378631 UNIT/GM powder Apply  topically to the appropriate area as directed 4 (Four) Times a Day. 45 g 1    spironolactone (ALDACTONE) 50 MG tablet Take 1 tablet by mouth once daily 90 tablet 3     No current facility-administered medications on file prior to visit.       Past Medical History:   Diagnosis Date    Allergic rhinitis     Asthma     Bilateral carpal tunnel syndrome     Endometriosis     Fractures     Herpes zoster without complication     Hyperlipidemia     Hypertension     ON TOPROL for a diagnosis of MVP but was later determined no MVp and MD decided not to stop since she has been on a while. lisinopril is prescribed for diabetes    CAMEJO (nonalcoholic steatohepatitis)     Primary osteoarthritis of right knee 10/06/2020    Psoriasis     Renal insufficiency     Sleep apnea     USES C-PAP    Vitamin D deficiency        Family History   Problem Relation Age of Onset    Vaginal cancer Mother     Lung cancer Mother     Arthritis Mother     Diabetes Mother             Cancer Mother         Lung    Coronary artery disease Father     Arthritis Father     Kidney cancer Father     Cancer Father         Kidney    Coronary artery disease Brother     Asthma Sister     Osteoporosis Sister     Osteoporosis Maternal Grandmother             Osteoporosis Sister         Being treated    Malig Hyperthermia Neg Hx        Social History     Socioeconomic History    Marital status:    Tobacco Use    Smoking status: Never    Smokeless tobacco: Never    Tobacco comments:     lots of second hand smoke during childhood    Vaping Use    Vaping Use: Never used   Substance and Sexual Activity    Alcohol use: Yes     Alcohol/week: 0.0 standard drinks     Comment: maybe have 1 drink every couple of months    Drug use: Never    Sexual activity: Defer       Past Surgical History:   Procedure Laterality Date    APPENDECTOMY      BACK SURGERY  1982    CATARACT EXTRACTION, BILATERAL Bilateral 2015    CHOLECYSTECTOMY  2000'S    COLONOSCOPY      EYE SURGERY      HYSTERECTOMY  1982    JOINT REPLACEMENT      KNEE ARTHROPLASTY UNICOMPARTMENTAL Left 6/1/2018    Procedure: KNEE ARTHROPLASTY UNICOMPARTMENTAL;  Surgeon: Emanuel Jack MD;  Location: [facility];  Service: Orthopedics    LUMBAR DISCECTOMY  1982    TIBIA FASCIOTOMY Left 2006    TOTAL KNEE ARTHROPLASTY Right 12/18/2020    Procedure: TOTAL KNEE ARTHROPLASTY;  Surgeon: Emanuel Jack MD;  Location: [facility];  Service: Orthopedics;  Laterality: Right;    TUBAL ABDOMINAL LIGATION Bilateral 1981         The following portions of the patient's history were reviewed and updated as appropriate: problem list, allergies, current medications, past medical history, past family history, past social history, and past surgical history.    Review of Systems   Constitutional: Negative for weight loss.   Eyes:  Negative for blurred vision.   Cardiovascular:  Negative for chest pain.   Endocrine: Negative for polydipsia, polyphagia and polyuria.   Neurological:  Negative for dizziness, headaches, seizures, tremors and weakness.   Psychiatric/Behavioral:  The patient is not nervous/anxious.      See HPI    Immunization History   Administered Date(s) Administered    COVID-19 (PFIZER) Purple Cap Monovalent 03/20/2021, 04/10/2021, 12/18/2021    FluMist 2-49yrs 10/02/2013, 10/01/2014, 10/07/2015    Fluzone Quad >6mos (Multi-dose) 09/22/2016, 12/12/2017    Hepatitis A 05/12/2018, 12/04/2018    Influenza Quad Vaccine (Inpatient) 12/12/2017    Influenza, Unspecified 12/04/2018    Pneumococcal Conjugate  "13-Valent (PCV13) 06/04/2021    Pneumococcal Polysaccharide (PPSV23) 04/17/2014    Shingrix 08/06/2022    Td (TDVAX) 10/02/2005    Tdap 04/17/2014, 09/15/2019    Zostavax 08/24/2012       Objective   Vitals:    08/18/23 1127   BP: 116/72   Temp: 98.1 øF (36.7 øC)   Weight: 95.3 kg (210 lb)   Height: 172.7 cm (67.99\")     Body mass index is 31.94 kg/mý.  Physical Exam  Vitals reviewed.   Constitutional:       Appearance: She is well-developed.   HENT:      Head: Normocephalic and atraumatic.   Cardiovascular:      Rate and Rhythm: Normal rate and regular rhythm.      Heart sounds: Normal heart sounds, S1 normal and S2 normal.   Pulmonary:      Effort: Pulmonary effort is normal.      Breath sounds: Normal breath sounds.   Skin:     General: Skin is warm.   Neurological:      Mental Status: She is alert.   Psychiatric:         Behavior: Behavior normal.         Assessment & Plan   Diagnoses and all orders for this visit:    1. Primary hypertension (Primary)  Comments:  Well controlled, continue current meds.    2. CAMEJO (nonalcoholic steatohepatitis)  Comments:  Labs improving.    3. Type 2 diabetes mellitus without complication, without long-term current use of insulin  Comments:  Controlled, marked improvement since starting Trulicity. Titrate to max dose and continue.    Other orders  -     Dulaglutide 4.5 MG/0.5ML solution pen-injector; Inject 0.5 mL under the skin into the appropriate area as directed 1 (One) Time Per Week.  Dispense: 2 mL; Refill: 5         Return in about 6 months (around 2/18/2024) for Lab Appt Before [facility], Medicare Wellness.           "

## 2023-08-20 VITALS
SYSTOLIC BLOOD PRESSURE: 116 MMHG | HEIGHT: 68 IN | DIASTOLIC BLOOD PRESSURE: 72 MMHG | WEIGHT: 210 LBS | BODY MASS INDEX: 31.83 KG/M2 | TEMPERATURE: 98.1 F

## 2023-08-28 DIAGNOSIS — L98.9 LESION OF FACE: Primary | ICD-10-CM

## 2023-09-26 DIAGNOSIS — E11.9 TYPE 2 DIABETES MELLITUS WITHOUT COMPLICATION, WITHOUT LONG-TERM CURRENT USE OF INSULIN: ICD-10-CM

## 2023-09-26 RX ORDER — EMPAGLIFLOZIN 25 MG/1
TABLET, FILM COATED ORAL
Qty: 30 TABLET | Refills: 5 | Status: SHIPPED | OUTPATIENT
Start: 2023-09-26

## 2023-11-04 DIAGNOSIS — E03.9 ACQUIRED HYPOTHYROIDISM: ICD-10-CM

## 2023-11-04 DIAGNOSIS — J30.1 NON-SEASONAL ALLERGIC RHINITIS DUE TO POLLEN: ICD-10-CM

## 2023-11-05 DIAGNOSIS — I10 ESSENTIAL HYPERTENSION: ICD-10-CM

## 2023-11-06 RX ORDER — METOPROLOL SUCCINATE 100 MG/1
100 TABLET, EXTENDED RELEASE ORAL EVERY MORNING
Qty: 90 TABLET | Refills: 3 | Status: SHIPPED | OUTPATIENT
Start: 2023-11-06

## 2023-11-06 RX ORDER — MONTELUKAST SODIUM 10 MG/1
10 TABLET ORAL NIGHTLY
Qty: 90 TABLET | Refills: 1 | Status: SHIPPED | OUTPATIENT
Start: 2023-11-06

## 2023-11-06 RX ORDER — LEVOTHYROXINE SODIUM 0.1 MG/1
100 TABLET ORAL DAILY
Qty: 90 TABLET | Refills: 1 | Status: SHIPPED | OUTPATIENT
Start: 2023-11-06

## 2023-11-25 NOTE — TELEPHONE ENCOUNTER
Caller: Namrata Luz    Relationship: Self    Best call back number: [phone]     What form or medical record are you requesting: RETURN TO WORK NOTED WRITTEN 01/05/21 (STATING PATIENT CAN RETURN TO WORK 2/01/21)     Who is requesting this form or medical record from you: PATIENT TO HAVE SENT TO EMPLOYER [facility] [employer]     How would you like to receive the form or medical records (pick-up, mail, fax): FAX - ATTN: DARIN VILLA   If fax, what is the fax number: [phone]     Timeframe paperwork needed: BEFORE MON 02/01/21     Additional notes: PATIENT CAN VIEW RETURN TO WORK NOTE IN [employer] BUT UNABLE TO SAVE AS A .PDF NOR IS SHE ABLE TO EMAIL IT TO HERSELF / HER EMPLOYER     PATIENT CALL BACK [phone]     THANKS    Acute colitis

## 2023-12-06 ENCOUNTER — TELEPHONE (OUTPATIENT)
Dept: INTERNAL MEDICINE | Facility: CLINIC | Age: 68
End: 2023-12-06

## 2023-12-06 NOTE — TELEPHONE ENCOUNTER
Caller: Namrata Luz    Relationship: Self    Best call back number:     [phone]        What was the call regarding: PATIENT'S MEDICARE PART D (FOR RX'S) IS CHANGING AND JARDIANCE IS $700 PER MONTH UNTIL THE DEDUCTIBLE MET WHICH IS $2400. PATIENT IS WANTING TO KNOW IF THERE IS SOMETHING COMPARABLE THAT IT CAN BE SWITCHED TO. PLEASE CALL AND ADVISE.

## 2023-12-06 NOTE — TELEPHONE ENCOUNTER
Caller: Namrata Luz    Relationship: Self    Best call back number: [phone]    What was the call regarding: PATIENT CALLING TO ADD THAT TRULICITY IS NOT COVERED BY THE INSURANCE EITHER. PATIENT IS CONCERNED AND IS REQUESTING A CALL BACK.     Is it okay if the provider responds through MyChart: NO

## 2023-12-18 DIAGNOSIS — E11.9 TYPE 2 DIABETES MELLITUS WITHOUT COMPLICATION, WITHOUT LONG-TERM CURRENT USE OF INSULIN: ICD-10-CM

## 2023-12-18 DIAGNOSIS — I10 ESSENTIAL HYPERTENSION: ICD-10-CM

## 2023-12-18 DIAGNOSIS — E78.2 MIXED HYPERLIPIDEMIA: ICD-10-CM

## 2023-12-18 RX ORDER — SPIRONOLACTONE 50 MG/1
TABLET, FILM COATED ORAL
Qty: 90 TABLET | Refills: 0 | Status: SHIPPED | OUTPATIENT
Start: 2023-12-18

## 2023-12-18 RX ORDER — FENOFIBRATE 145 MG/1
TABLET, COATED ORAL
Qty: 90 TABLET | Refills: 0 | Status: SHIPPED | OUTPATIENT
Start: 2023-12-18

## 2023-12-18 RX ORDER — LISINOPRIL 5 MG/1
TABLET ORAL
Qty: 90 TABLET | Refills: 0 | Status: SHIPPED | OUTPATIENT
Start: 2023-12-18

## 2023-12-18 RX ORDER — GLIPIZIDE 10 MG/1
10 TABLET, FILM COATED, EXTENDED RELEASE ORAL DAILY
Qty: 90 TABLET | Refills: 0 | Status: SHIPPED | OUTPATIENT
Start: 2023-12-18

## 2024-01-08 NOTE — PLAN OF CARE
Nasal steroid spray like Flonase every day until better  Antihistamine like Zyrtec daily  Nasal saline rinses   Problem: Patient Care Overview  Goal: Plan of Care Review  Outcome: Outcome(s) achieved Date Met: 06/01/18 06/01/18 1549   OTHER   Outcome Summary Patient is ambulating well using walker and stand by or x1 assist. Vitals are stable and voiding function is intact. Pain is well controlled with po meds. Patient educated on bp monitoring and med managment Patient feels prepared and ready for d/c home with hh.   Coping/Psychosocial   Plan of Care Reviewed With patient   Plan of Care Review   Progress improving     Goal: Discharge Needs Assessment  Outcome: Outcome(s) achieved Date Met: 06/01/18 06/01/18 1549   Discharge Needs Assessment   Transportation Anticipated family or friend will provide   Discharge Facility/Level of Care Needs home with home health       Problem: Fall Risk (Adult)  Goal: Identify Related Risk Factors and Signs and Symptoms  Outcome: Outcome(s) achieved Date Met: 06/01/18 06/01/18 1549   Fall Risk (Adult)   Related Risk Factors (Fall Risk) environment unfamiliar;gait/mobility problems;fear of falling;fatigue/slow reaction;culprit medication(s);impaired vision   Signs and Symptoms (Fall Risk) presence of risk factors     Goal: Absence of Fall  Outcome: Outcome(s) achieved Date Met: 06/01/18      Problem: Knee Arthroplasty (Total, Partial) (Adult)  Goal: Signs and Symptoms of Listed Potential Problems Will be Absent, Minimized or Managed (Knee Arthroplasty)  Outcome: Outcome(s) achieved Date Met: 06/01/18 06/01/18 1549   Goal/Outcome Evaluation   Problems Assessed (Knee Arthroplasty) all   Problems Present (Knee Arthroplasty) functional deficit;pain;range of motion decreased     Goal: Anesthesia/Sedation Recovery  Outcome: Outcome(s) achieved Date Met: 06/01/18 06/01/18 1549   Goal/Outcome Evaluation   Anesthesia/Sedation Recovery recovered to baseline

## 2024-01-19 DIAGNOSIS — E03.9 ACQUIRED HYPOTHYROIDISM: ICD-10-CM

## 2024-01-19 DIAGNOSIS — J30.1 NON-SEASONAL ALLERGIC RHINITIS DUE TO POLLEN: ICD-10-CM

## 2024-01-19 RX ORDER — LEVOTHYROXINE SODIUM 0.1 MG/1
100 TABLET ORAL DAILY
Qty: 90 TABLET | Refills: 0 | OUTPATIENT
Start: 2024-01-19

## 2024-01-19 RX ORDER — LEVOTHYROXINE SODIUM 0.1 MG/1
100 TABLET ORAL DAILY
Qty: 90 TABLET | Refills: 0 | Status: SHIPPED | OUTPATIENT
Start: 2024-01-19

## 2024-01-19 RX ORDER — MONTELUKAST SODIUM 10 MG/1
10 TABLET ORAL NIGHTLY
Qty: 90 TABLET | Refills: 0 | Status: SHIPPED | OUTPATIENT
Start: 2024-01-19

## 2024-02-02 RX ORDER — DULAGLUTIDE 4.5 MG/.5ML
INJECTION, SOLUTION SUBCUTANEOUS
Qty: 4 ML | Refills: 0 | Status: SHIPPED | OUTPATIENT
Start: 2024-02-02

## 2024-02-12 DIAGNOSIS — E03.9 ACQUIRED HYPOTHYROIDISM: ICD-10-CM

## 2024-02-12 DIAGNOSIS — I10 PRIMARY HYPERTENSION: Primary | ICD-10-CM

## 2024-02-12 DIAGNOSIS — E11.9 TYPE 2 DIABETES MELLITUS WITHOUT COMPLICATION, WITHOUT LONG-TERM CURRENT USE OF INSULIN: ICD-10-CM

## 2024-02-12 DIAGNOSIS — E78.2 MIXED HYPERLIPIDEMIA: ICD-10-CM

## 2024-02-14 DIAGNOSIS — E78.2 MIXED HYPERLIPIDEMIA: ICD-10-CM

## 2024-02-14 DIAGNOSIS — I10 ESSENTIAL HYPERTENSION: ICD-10-CM

## 2024-02-14 DIAGNOSIS — E11.9 TYPE 2 DIABETES MELLITUS WITHOUT COMPLICATION, WITHOUT LONG-TERM CURRENT USE OF INSULIN: ICD-10-CM

## 2024-02-14 LAB
ALBUMIN SERPL-MCNC: 4.6 G/DL (ref 3.5–5.2)
ALBUMIN/GLOB SERPL: 1.8 G/DL
ALP SERPL-CCNC: 82 U/L (ref 39–117)
ALT SERPL-CCNC: 35 U/L (ref 1–33)
AST SERPL-CCNC: 22 U/L (ref 1–32)
BILIRUB SERPL-MCNC: 0.5 MG/DL (ref 0–1.2)
BUN SERPL-MCNC: 13 MG/DL (ref 8–23)
BUN/CREAT SERPL: 13 (ref 7–25)
CALCIUM SERPL-MCNC: 9.9 MG/DL (ref 8.6–10.5)
CHLORIDE SERPL-SCNC: 102 MMOL/L (ref 98–107)
CHOLEST SERPL-MCNC: 137 MG/DL (ref 0–200)
CHOLEST/HDLC SERPL: 3.81 {RATIO}
CO2 SERPL-SCNC: 25.1 MMOL/L (ref 22–29)
CREAT SERPL-MCNC: 1 MG/DL (ref 0.57–1)
EGFRCR SERPLBLD CKD-EPI 2021: 61.5 ML/MIN/1.73
GLOBULIN SER CALC-MCNC: 2.5 GM/DL
GLUCOSE SERPL-MCNC: 98 MG/DL (ref 65–99)
HBA1C MFR BLD: 6.4 % (ref 4.8–5.6)
HDLC SERPL-MCNC: 36 MG/DL (ref 40–60)
LDLC SERPL CALC-MCNC: 74 MG/DL (ref 0–100)
POTASSIUM SERPL-SCNC: 4.7 MMOL/L (ref 3.5–5.2)
PROT SERPL-MCNC: 7.1 G/DL (ref 6–8.5)
SODIUM SERPL-SCNC: 139 MMOL/L (ref 136–145)
TRIGL SERPL-MCNC: 158 MG/DL (ref 0–150)
TSH SERPL DL<=0.005 MIU/L-ACNC: 0.86 UIU/ML (ref 0.27–4.2)
VLDLC SERPL CALC-MCNC: 27 MG/DL (ref 5–40)

## 2024-02-14 RX ORDER — LISINOPRIL 5 MG/1
TABLET ORAL
Qty: 90 TABLET | Refills: 0 | Status: SHIPPED | OUTPATIENT
Start: 2024-02-14

## 2024-02-14 RX ORDER — SPIRONOLACTONE 50 MG/1
TABLET, FILM COATED ORAL
Qty: 90 TABLET | Refills: 0 | Status: SHIPPED | OUTPATIENT
Start: 2024-02-14

## 2024-02-14 RX ORDER — GLIPIZIDE 10 MG/1
10 TABLET, FILM COATED, EXTENDED RELEASE ORAL DAILY
Qty: 90 TABLET | Refills: 0 | Status: SHIPPED | OUTPATIENT
Start: 2024-02-14

## 2024-02-14 RX ORDER — FENOFIBRATE 145 MG/1
TABLET, COATED ORAL
Qty: 90 TABLET | Refills: 0 | Status: SHIPPED | OUTPATIENT
Start: 2024-02-14

## 2024-02-19 ENCOUNTER — OFFICE VISIT (OUTPATIENT)
Dept: INTERNAL MEDICINE | Facility: CLINIC | Age: 69
End: 2024-02-19
Payer: MEDICARE

## 2024-02-19 VITALS
TEMPERATURE: 98 F | BODY MASS INDEX: 31.43 KG/M2 | HEIGHT: 68 IN | DIASTOLIC BLOOD PRESSURE: 70 MMHG | WEIGHT: 207.4 LBS | SYSTOLIC BLOOD PRESSURE: 112 MMHG

## 2024-02-19 DIAGNOSIS — Z00.00 MEDICARE ANNUAL WELLNESS VISIT, SUBSEQUENT: Primary | ICD-10-CM

## 2024-02-19 DIAGNOSIS — E11.9 TYPE 2 DIABETES MELLITUS WITHOUT COMPLICATION, WITHOUT LONG-TERM CURRENT USE OF INSULIN: ICD-10-CM

## 2024-02-19 DIAGNOSIS — I10 PRIMARY HYPERTENSION: ICD-10-CM

## 2024-02-19 DIAGNOSIS — E78.2 MIXED HYPERLIPIDEMIA: ICD-10-CM

## 2024-02-19 DIAGNOSIS — E03.9 ACQUIRED HYPOTHYROIDISM: ICD-10-CM

## 2024-02-19 DIAGNOSIS — K75.81 NASH (NONALCOHOLIC STEATOHEPATITIS): ICD-10-CM

## 2024-02-19 PROCEDURE — 3078F DIAST BP <80 MM HG: CPT | Performed by: PHYSICIAN ASSISTANT

## 2024-02-19 PROCEDURE — 3074F SYST BP LT 130 MM HG: CPT | Performed by: PHYSICIAN ASSISTANT

## 2024-02-19 PROCEDURE — 3044F HG A1C LEVEL LT 7.0%: CPT | Performed by: PHYSICIAN ASSISTANT

## 2024-02-19 PROCEDURE — G0439 PPPS, SUBSEQ VISIT: HCPCS | Performed by: PHYSICIAN ASSISTANT

## 2024-02-19 PROCEDURE — 99214 OFFICE O/P EST MOD 30 MIN: CPT | Performed by: PHYSICIAN ASSISTANT

## 2024-02-19 PROCEDURE — 1159F MED LIST DOCD IN RCRD: CPT | Performed by: PHYSICIAN ASSISTANT

## 2024-02-19 PROCEDURE — 1160F RVW MEDS BY RX/DR IN RCRD: CPT | Performed by: PHYSICIAN ASSISTANT

## 2024-02-19 PROCEDURE — 1170F FXNL STATUS ASSESSED: CPT | Performed by: PHYSICIAN ASSISTANT

## 2024-02-19 NOTE — PROGRESS NOTES
The ABCs of the Annual Wellness Visit  Subsequent Medicare Wellness Visit    Subjective    Namrata Luz is a 68 y.o. female who presents for a Subsequent Medicare Wellness Visit.    The following portions of the patient's history were reviewed and   updated as appropriate: allergies, current medications, past family history, past medical history, past social history, past surgical history, and problem list.    Compared to one year ago, the patient feels her physical   health is the same.    Compared to one year ago, the patient feels her mental   health is the same.    Recent Hospitalizations:  She was not admitted to the hospital during the last year.       Current Medical Providers:  Patient Care Team:  Sujata Breen PA-C as PCP - General (Physician Assistant)  Emanuel Jack MD as Surgeon (Orthopedic Surgery)  Charlie Alegria MD as Consulting Physician (Pulmonary Disease)    Outpatient Medications Prior to Visit   Medication Sig Dispense Refill    Accu-Chek Tavia Plus test strip USE AS DIRECTED TO TEST BLOOD SUGAR once DAILY 100 each 1    Accu-Chek Softclix Lancets lancets USE AS DIRECTED TO TEST BLOOD SUGAR DAILY 100 each 1    albuterol sulfate  (90 Base) MCG/ACT inhaler Inhale 2 puffs Every 4 (Four) Hours As Needed for Wheezing. 18 g 0    atorvastatin (LIPITOR) 40 MG tablet TAKE 1 TABLET BY MOUTH ONCE DAILY AS DIRECTED 90 tablet 3    clobetasol (TEMOVATE) 0.05 % external solution Apply 1 application topically to the appropriate area as directed As Needed (for psoriasis). 50 mL 2    fenofibrate (TRICOR) 145 MG tablet Take 1 tablet by mouth once daily 90 tablet 0    fluocinolone (SYNALAR) 0.01 % external solution Apply  topically to the appropriate area as directed 2 (Two) Times a Day. 60 mL 2    glipizide (GLUCOTROL XL) 10 MG 24 hr tablet Take 1 tablet by mouth once daily 90 tablet 0    glucose monitor monitoring kit 1 each As Needed (test as directed). accu check avia plus meter 1 each 0     levothyroxine (SYNTHROID, LEVOTHROID) 100 MCG tablet Take 1 tablet by mouth once daily 90 tablet 0    lisinopril (PRINIVIL,ZESTRIL) 5 MG tablet Take 1 tablet by mouth once daily 90 tablet 0    metFORMIN (GLUCOPHAGE) 500 MG tablet TAKE 1 TABLET BY MOUTH TWICE DAILY WITH MEALS 180 tablet 0    metoprolol succinate XL (TOPROL-XL) 100 MG 24 hr tablet Take 1 tablet by mouth Every Morning. 90 tablet 3    montelukast (SINGULAIR) 10 MG tablet TAKE 1 TABLET BY MOUTH ONCE DAILY AT NIGHT 90 tablet 0    nystatin (MYCOSTATIN) 768208 UNIT/GM powder Apply  topically to the appropriate area as directed 4 (Four) Times a Day. 45 g 1    spironolactone (ALDACTONE) 50 MG tablet Take 1 tablet by mouth once daily 90 tablet 0    Trulicity 4.5 MG/0.5ML solution pen-injector INJECT 0.5ML SUBCUTANEOUSLY INTO THE APPROPRIATE AREA ONCE A WEEK AS DIRECTED 4 mL 0    azithromycin (ZITHROMAX) 250 MG tablet Take 2 tablets the first day, then 1 tablet daily for 4 days. (Patient not taking: Reported on 2/19/2024) 6 tablet 0    dapagliflozin Propanediol (Farxiga) 10 MG tablet Take 10 mg by mouth Daily. (Patient not taking: Reported on 2/19/2024) 90 tablet 1     No facility-administered medications prior to visit.       No opioid medication identified on active medication list. I have reviewed chart for other potential  high risk medication/s and harmful drug interactions in the elderly.        Aspirin is not on active medication list.  Aspirin use is not indicated based on review of current medical condition/s. Risk of harm outweighs potential benefits.  .    Patient Active Problem List   Diagnosis    Asthma    Mixed anxiety depressive disorder    Hyperlipidemia    Hypertension    Hypothyroidism    Lung nodule, solitary    Psoriasis    Vitamin D deficiency    Type 2 diabetes mellitus without complication    Chronic pain of left knee    CAMEJO (nonalcoholic steatohepatitis)    Allergic rhinitis    Bilateral carpal tunnel syndrome     Advance Care Planning  "  Advance Care Planning     Advance Directive is on file.  ACP discussion was held with the patient during this visit. Patient has an advance directive in EMR which is still valid.      Objective    Vitals:    24 0922   BP: 112/70   Temp: 98 °F (36.7 °C)   Weight: 94.1 kg (207 lb 6.4 oz)   Height: 172.7 cm (67.99\")     Estimated body mass index is 31.54 kg/m² as calculated from the following:    Height as of this encounter: 172.7 cm (67.99\").    Weight as of this encounter: 94.1 kg (207 lb 6.4 oz).    BMI is >= 30 and <35. (Class 1 Obesity). The following options were offered after discussion;: exercise counseling/recommendations and nutrition counseling/recommendations      Does the patient have evidence of cognitive impairment? No    Lab Results   Component Value Date    CHLPL 137 2024    TRIG 158 (H) 2024    HDL 36 (L) 2024    LDL 74 2024    VLDL 27 2024    HGBA1C 6.40 (H) 2024        HEALTH RISK ASSESSMENT    Smoking Status:  Social History     Tobacco Use   Smoking Status Never   Smokeless Tobacco Never   Tobacco Comments    lots of second hand smoke during childhood     Alcohol Consumption:  Social History     Substance and Sexual Activity   Alcohol Use Yes    Alcohol/week: 0.0 standard drinks of alcohol    Comment: maybe have 1 drink every couple of months     Fall Risk Screen:    YURY Fall Risk Assessment was completed, and patient is at LOW risk for falls.Assessment completed on:2024    Depression Screenin/19/2024     9:30 AM   PHQ-2/PHQ-9 Depression Screening   Little Interest or Pleasure in Doing Things 0-->not at all   Feeling Down, Depressed or Hopeless 0-->not at all   PHQ-9: Brief Depression Severity Measure Score 0       Health Habits and Functional and Cognitive Screenin/19/2024     9:29 AM   Functional & Cognitive Status   Do you have difficulty preparing food and eating? No   Do you have difficulty bathing yourself, getting dressed " or grooming yourself? No   Do you have difficulty using the toilet? No   Do you have difficulty moving around from place to place? No   Do you have trouble with steps or getting out of a bed or a chair? No   Current Diet Well Balanced Diet   Dental Exam Up to date   Eye Exam Up to date   Exercise (times per week) 0 times per week   Current Exercises Include No Regular Exercise   Do you need help using the phone?  No   Are you deaf or do you have serious difficulty hearing?  No   Do you need help to go to places out of walking distance? No   Do you need help shopping? No   Do you need help preparing meals?  No   Do you need help with housework?  No   Do you need help with laundry? No   Do you need help taking your medications? No   Do you need help managing money? No   Do you ever drive or ride in a car without wearing a seat belt? No       Age-appropriate Screening Schedule:  Refer to the list below for future screening recommendations based on patient's age, sex and/or medical conditions. Orders for these recommended tests are listed in the plan section. The patient has been provided with a written plan.    Health Maintenance   Topic Date Due    DIABETIC EYE EXAM  02/13/2021    COVID-19 Vaccine (4 - 2023-24 season) 09/01/2023    ANNUAL WELLNESS VISIT  09/16/2023    DIABETIC FOOT EXAM  09/16/2023    BMI FOLLOWUP  09/16/2023    MAMMOGRAM  07/27/2024    HEMOGLOBIN A1C  08/14/2024    URINE MICROALBUMIN  08/15/2024    COLORECTAL CANCER SCREENING  01/01/2025    LIPID PANEL  02/14/2025    TDAP/TD VACCINES (4 - Td or Tdap) 09/15/2029    HEPATITIS C SCREENING  Completed    RSV Vaccine - Adults  Completed    INFLUENZA VACCINE  Completed    ZOSTER VACCINE  Completed    Pneumococcal Vaccine 65+  Discontinued    PAP SMEAR  Discontinued    DXA SCAN  Discontinued                  CMS Preventative Services Quick Reference  Risk Factors Identified During Encounter  None Identified  The above risks/problems have been discussed with  "the patient.  Pertinent information has been shared with the patient in the After Visit Summary.  An After Visit Summary and PPPS were made available to the patient.    Follow Up:   Next Medicare Wellness visit to be scheduled in 1 year.       Additional E&M Note during same encounter follows:  Patient has multiple medical problems which are significant and separately identifiable that require additional work above and beyond the Medicare Wellness Visit.      Chief Complaint  Medicare Wellness-subsequent    Subjective        HPI  Namrata Luz is also being seen today for DM, HLD, Hypothyroidism and HTN. No Cp or SOA. Doing well on diabetes meds.          Objective   Vital Signs:  /70   Temp 98 °F (36.7 °C)   Ht 172.7 cm (67.99\")   Wt 94.1 kg (207 lb 6.4 oz)   BMI 31.54 kg/m²     Physical Exam  Vitals reviewed.   Constitutional:       Appearance: Normal appearance.   HENT:      Head: Normocephalic and atraumatic.   Cardiovascular:      Rate and Rhythm: Normal rate and regular rhythm.      Heart sounds: Normal heart sounds.   Pulmonary:      Effort: Pulmonary effort is normal.      Breath sounds: Normal breath sounds.   Neurological:      Mental Status: She is alert.                         Assessment and Plan   Diagnoses and all orders for this visit:    1. Medicare annual wellness visit, subsequent (Primary)    2. Type 2 diabetes mellitus without complication, without long-term current use of insulin  Comments:  Well controlled, no changes in meds.    3. CAMEJO (nonalcoholic steatohepatitis)  Comments:  Stable.    4. Acquired hypothyroidism  Comments:  TSH is therapeutic    5. Primary hypertension  Comments:  Controlled    6. Mixed hyperlipidemia  Comments:  To goal             Follow Up   Return in about 6 months (around 8/19/2024) for Lab Appt Before FUP.  Patient was given instructions and counseling regarding her condition or for health maintenance advice. Please see specific information pulled into " the AVS if appropriate.

## 2024-03-19 RX ORDER — DULAGLUTIDE 4.5 MG/.5ML
0.5 INJECTION, SOLUTION SUBCUTANEOUS
Qty: 4 ML | Refills: 0 | Status: SHIPPED | OUTPATIENT
Start: 2024-03-19

## 2024-03-23 DIAGNOSIS — E11.9 TYPE 2 DIABETES MELLITUS WITHOUT COMPLICATION, WITHOUT LONG-TERM CURRENT USE OF INSULIN: ICD-10-CM

## 2024-03-25 RX ORDER — EMPAGLIFLOZIN 25 MG/1
TABLET, FILM COATED ORAL
Qty: 30 TABLET | Refills: 0 | OUTPATIENT
Start: 2024-03-25

## 2024-04-08 DIAGNOSIS — E11.9 TYPE 2 DIABETES MELLITUS WITHOUT COMPLICATION, WITHOUT LONG-TERM CURRENT USE OF INSULIN: Primary | ICD-10-CM

## 2024-04-08 RX ORDER — SEMAGLUTIDE 2.68 MG/ML
2 INJECTION, SOLUTION SUBCUTANEOUS WEEKLY
Qty: 3 ML | Refills: 6 | Status: SHIPPED | OUTPATIENT
Start: 2024-04-08

## 2024-04-16 DIAGNOSIS — E11.9 TYPE 2 DIABETES MELLITUS WITHOUT COMPLICATION, WITHOUT LONG-TERM CURRENT USE OF INSULIN: ICD-10-CM

## 2024-04-16 DIAGNOSIS — E78.2 MIXED HYPERLIPIDEMIA: ICD-10-CM

## 2024-04-16 DIAGNOSIS — I10 ESSENTIAL HYPERTENSION: ICD-10-CM

## 2024-04-16 DIAGNOSIS — E03.9 ACQUIRED HYPOTHYROIDISM: ICD-10-CM

## 2024-04-17 DIAGNOSIS — T75.3XXA MOTION SICKNESS, INITIAL ENCOUNTER: Primary | ICD-10-CM

## 2024-04-17 RX ORDER — GLIPIZIDE 10 MG/1
10 TABLET, FILM COATED, EXTENDED RELEASE ORAL DAILY
Qty: 90 TABLET | Refills: 0 | Status: SHIPPED | OUTPATIENT
Start: 2024-04-17

## 2024-04-17 RX ORDER — EMPAGLIFLOZIN 25 MG/1
TABLET, FILM COATED ORAL
Qty: 30 TABLET | Refills: 0 | Status: SHIPPED | OUTPATIENT
Start: 2024-04-17

## 2024-04-17 RX ORDER — SCOLOPAMINE TRANSDERMAL SYSTEM 1 MG/1
1 PATCH, EXTENDED RELEASE TRANSDERMAL
Qty: 4 EACH | Refills: 0 | Status: SHIPPED | OUTPATIENT
Start: 2024-04-17

## 2024-04-17 RX ORDER — LEVOTHYROXINE SODIUM 0.1 MG/1
100 TABLET ORAL DAILY
Qty: 90 TABLET | Refills: 0 | Status: SHIPPED | OUTPATIENT
Start: 2024-04-17

## 2024-04-17 RX ORDER — LISINOPRIL 5 MG/1
TABLET ORAL
Qty: 90 TABLET | Refills: 0 | Status: SHIPPED | OUTPATIENT
Start: 2024-04-17

## 2024-04-17 RX ORDER — FENOFIBRATE 145 MG/1
TABLET, COATED ORAL
Qty: 90 TABLET | Refills: 0 | Status: SHIPPED | OUTPATIENT
Start: 2024-04-17

## 2024-04-24 ENCOUNTER — TRANSCRIBE ORDERS (OUTPATIENT)
Dept: ADMINISTRATIVE | Facility: HOSPITAL | Age: 69
End: 2024-04-24
Payer: MEDICARE

## 2024-04-24 DIAGNOSIS — Z12.31 VISIT FOR SCREENING MAMMOGRAM: Primary | ICD-10-CM

## 2024-05-18 DIAGNOSIS — E11.9 TYPE 2 DIABETES MELLITUS WITHOUT COMPLICATION, WITHOUT LONG-TERM CURRENT USE OF INSULIN: ICD-10-CM

## 2024-05-20 RX ORDER — EMPAGLIFLOZIN 25 MG/1
TABLET, FILM COATED ORAL
Qty: 30 TABLET | Refills: 11 | Status: SHIPPED | OUTPATIENT
Start: 2024-05-20

## 2024-06-11 ENCOUNTER — TELEPHONE (OUTPATIENT)
Dept: INTERNAL MEDICINE | Facility: CLINIC | Age: 69
End: 2024-06-11
Payer: MEDICARE

## 2024-06-11 NOTE — TELEPHONE ENCOUNTER
Caller: Namrata Luz    Relationship: Self    Best call back number: [phone]     What is the best time to reach you: ANYTIME    Who are you requesting to speak with (clinical staff, provider,  specific staff member): IRIS SCHROEDER    What was the call regarding: PATIENT STATED SHE HAS A VERY BAD AND PAINFUL INGROWN TOENAIL ON HER LEFT BIG TOE. PATIENT STATED THIS IS CAUSING ENOUGH PAIN FOR HER TO FEEL NAUSEATED, THE TOE IS RED AND PAINFUL TO TOUCH    PATIENT STATED SHE CONTACTED A PODIATRIST, ERIN AND ERNLOW, HOWEVER THE SOONEST SHE COULD BE SCHEDULED WOULD BE 06- AT THE [facility]    PATIENT STATED SHE HAS SCHEDULED AN FOR 06- WITH [facility], HOWEVER WOULD LIKE TO KNOW IF IRIS SCHROEDER HAS ANY RECOMMENDATIONS FOR A LOCATION SHE MAY BE SEEN SOONER.    Is it okay if the provider responds through MyChart: YES

## 2024-06-17 ENCOUNTER — HOSPITAL ENCOUNTER (OUTPATIENT)
Dept: MAMMOGRAPHY | Facility: HOSPITAL | Age: 69
Discharge: HOME | End: 2024-06-17
Admitting: PHYSICIAN ASSISTANT
Payer: MEDICARE

## 2024-06-17 DIAGNOSIS — Z12.31 VISIT FOR SCREENING MAMMOGRAM: ICD-10-CM

## 2024-06-17 PROCEDURE — 77067 SCR MAMMO BI INCL CAD: CPT

## 2024-06-17 PROCEDURE — 77063 BREAST TOMOSYNTHESIS BI: CPT

## 2024-07-01 DIAGNOSIS — E11.9 TYPE 2 DIABETES MELLITUS WITHOUT COMPLICATION, WITHOUT LONG-TERM CURRENT USE OF INSULIN: ICD-10-CM

## 2024-07-01 RX ORDER — LANCETS
EACH MISCELLANEOUS
Qty: 100 EACH | Refills: 1 | Status: SHIPPED | OUTPATIENT
Start: 2024-07-01

## 2024-07-01 RX ORDER — BLOOD SUGAR DIAGNOSTIC
STRIP MISCELLANEOUS
Qty: 100 EACH | Refills: 0 | Status: SHIPPED | OUTPATIENT
Start: 2024-07-01

## 2024-07-01 RX ORDER — BLOOD SUGAR DIAGNOSTIC
STRIP MISCELLANEOUS
Qty: 100 EACH | Refills: 1 | Status: SHIPPED | OUTPATIENT
Start: 2024-07-01

## 2024-07-01 RX ORDER — BLOOD-GLUCOSE METER
1 KIT MISCELLANEOUS AS NEEDED
Qty: 1 EACH | Refills: 0 | Status: SHIPPED | OUTPATIENT
Start: 2024-07-01

## 2024-07-09 ENCOUNTER — TELEPHONE (OUTPATIENT)
Dept: INTERNAL MEDICINE | Facility: CLINIC | Age: 69
End: 2024-07-09
Payer: MEDICARE

## 2024-07-09 NOTE — TELEPHONE ENCOUNTER
Patient called concerned with low blood sugars, blurry vision several times a day in her left eye. Patient says that this is not normal for her and was wanting to see if Sujata wanted her to do something different with medicine. Call back number [phone]

## 2024-07-09 NOTE — TELEPHONE ENCOUNTER
When she wakes up sugar is 80-85, 2 hrs after eating it is 112-130. Eye is red and bloodshot, spends a lot of time outside. She has been feeling a little dizzy when she gets up but feels better after she drinks some orange juice.

## 2024-07-15 ENCOUNTER — HOSPITAL ENCOUNTER (OUTPATIENT)
Facility: HOSPITAL | Age: 69
Setting detail: OBSERVATION
Discharge: HOME | End: 2024-07-16
Attending: EMERGENCY MEDICINE | Admitting: EMERGENCY MEDICINE
Payer: MEDICARE

## 2024-07-15 ENCOUNTER — APPOINTMENT (OUTPATIENT)
Dept: CT IMAGING | Facility: HOSPITAL | Age: 69
End: 2024-07-15
Payer: MEDICARE

## 2024-07-15 ENCOUNTER — APPOINTMENT (OUTPATIENT)
Dept: MRI IMAGING | Facility: HOSPITAL | Age: 69
End: 2024-07-15
Payer: MEDICARE

## 2024-07-15 DIAGNOSIS — H53.9 VISION CHANGES: Primary | ICD-10-CM

## 2024-07-15 DIAGNOSIS — H35.81 RETINAL EDEMA OF BOTH EYES: ICD-10-CM

## 2024-07-15 LAB
ALBUMIN SERPL-MCNC: 4.4 G/DL (ref 3.5–5.2)
ALBUMIN/GLOB SERPL: 1.5 G/DL
ALP SERPL-CCNC: 81 U/L (ref 39–117)
ALT SERPL W P-5'-P-CCNC: 34 U/L (ref 1–33)
ANION GAP SERPL CALCULATED.3IONS-SCNC: 11.5 MMOL/L (ref 5–15)
APTT PPP: 29.9 SECONDS (ref 22.7–35.4)
AST SERPL-CCNC: 26 U/L (ref 1–32)
BASOPHILS # BLD AUTO: 0.08 10*3/MM3 (ref 0–0.2)
BASOPHILS NFR BLD AUTO: 0.9 % (ref 0–1.5)
BILIRUB SERPL-MCNC: 0.4 MG/DL (ref 0–1.2)
BUN SERPL-MCNC: 12 MG/DL (ref 8–23)
BUN/CREAT SERPL: 14.1 (ref 7–25)
CALCIUM SPEC-SCNC: 9.6 MG/DL (ref 8.6–10.5)
CHLORIDE SERPL-SCNC: 102 MMOL/L (ref 98–107)
CO2 SERPL-SCNC: 21.5 MMOL/L (ref 22–29)
CREAT SERPL-MCNC: 0.85 MG/DL (ref 0.57–1)
DEPRECATED RDW RBC AUTO: 45.3 FL (ref 37–54)
EGFRCR SERPLBLD CKD-EPI 2021: 74.7 ML/MIN/1.73
EOSINOPHIL # BLD AUTO: 0.94 10*3/MM3 (ref 0–0.4)
EOSINOPHIL NFR BLD AUTO: 10.1 % (ref 0.3–6.2)
ERYTHROCYTE [DISTWIDTH] IN BLOOD BY AUTOMATED COUNT: 14 % (ref 12.3–15.4)
GLOBULIN UR ELPH-MCNC: 2.9 GM/DL
GLUCOSE BLDC GLUCOMTR-MCNC: 153 MG/DL (ref 70–130)
GLUCOSE BLDC GLUCOMTR-MCNC: 86 MG/DL (ref 70–130)
GLUCOSE SERPL-MCNC: 78 MG/DL (ref 65–99)
HCT VFR BLD AUTO: 45.7 % (ref 34–46.6)
HGB BLD-MCNC: 14.9 G/DL (ref 12–15.9)
IMM GRANULOCYTES # BLD AUTO: 0.06 10*3/MM3 (ref 0–0.05)
IMM GRANULOCYTES NFR BLD AUTO: 0.6 % (ref 0–0.5)
INR PPP: 1.03 (ref 0.9–1.1)
LYMPHOCYTES # BLD AUTO: 1.94 10*3/MM3 (ref 0.7–3.1)
LYMPHOCYTES NFR BLD AUTO: 20.9 % (ref 19.6–45.3)
MCH RBC QN AUTO: 29.1 PG (ref 26.6–33)
MCHC RBC AUTO-ENTMCNC: 32.6 G/DL (ref 31.5–35.7)
MCV RBC AUTO: 89.3 FL (ref 79–97)
MONOCYTES # BLD AUTO: 0.6 10*3/MM3 (ref 0.1–0.9)
MONOCYTES NFR BLD AUTO: 6.5 % (ref 5–12)
NEUTROPHILS NFR BLD AUTO: 5.68 10*3/MM3 (ref 1.7–7)
NEUTROPHILS NFR BLD AUTO: 61 % (ref 42.7–76)
NRBC BLD AUTO-RTO: 0 /100 WBC (ref 0–0.2)
PLATELET # BLD AUTO: 333 10*3/MM3 (ref 140–450)
PMV BLD AUTO: 9.4 FL (ref 6–12)
POTASSIUM SERPL-SCNC: 4.5 MMOL/L (ref 3.5–5.2)
PROT SERPL-MCNC: 7.3 G/DL (ref 6–8.5)
PROTHROMBIN TIME: 13.7 SECONDS (ref 11.7–14.2)
RBC # BLD AUTO: 5.12 10*6/MM3 (ref 3.77–5.28)
SODIUM SERPL-SCNC: 135 MMOL/L (ref 136–145)
WBC NRBC COR # BLD AUTO: 9.3 10*3/MM3 (ref 3.4–10.8)

## 2024-07-15 PROCEDURE — 85610 PROTHROMBIN TIME: CPT | Performed by: EMERGENCY MEDICINE

## 2024-07-15 PROCEDURE — G0378 HOSPITAL OBSERVATION PER HR: HCPCS

## 2024-07-15 PROCEDURE — 85730 THROMBOPLASTIN TIME PARTIAL: CPT | Performed by: EMERGENCY MEDICINE

## 2024-07-15 PROCEDURE — 85652 RBC SED RATE AUTOMATED: CPT | Performed by: STUDENT IN AN ORGANIZED HEALTH CARE EDUCATION/TRAINING PROGRAM

## 2024-07-15 PROCEDURE — A9577 INJ MULTIHANCE: HCPCS | Performed by: EMERGENCY MEDICINE

## 2024-07-15 PROCEDURE — 85025 COMPLETE CBC W/AUTO DIFF WBC: CPT | Performed by: EMERGENCY MEDICINE

## 2024-07-15 PROCEDURE — 99285 EMERGENCY DEPT VISIT HI MDM: CPT

## 2024-07-15 PROCEDURE — 80053 COMPREHEN METABOLIC PANEL: CPT | Performed by: EMERGENCY MEDICINE

## 2024-07-15 PROCEDURE — 70450 CT HEAD/BRAIN W/O DYE: CPT

## 2024-07-15 PROCEDURE — 86140 C-REACTIVE PROTEIN: CPT | Performed by: STUDENT IN AN ORGANIZED HEALTH CARE EDUCATION/TRAINING PROGRAM

## 2024-07-15 PROCEDURE — 82948 REAGENT STRIP/BLOOD GLUCOSE: CPT

## 2024-07-15 PROCEDURE — 70553 MRI BRAIN STEM W/O & W/DYE: CPT

## 2024-07-15 PROCEDURE — 0 GADOBENATE DIMEGLUMINE 529 MG/ML SOLUTION: Performed by: EMERGENCY MEDICINE

## 2024-07-15 RX ORDER — BISACODYL 10 MG
10 SUPPOSITORY, RECTAL RECTAL DAILY PRN
Status: DISCONTINUED | OUTPATIENT
Start: 2024-07-15 | End: 2024-07-16 | Stop reason: HOSPADM

## 2024-07-15 RX ORDER — ATORVASTATIN CALCIUM 20 MG/1
20 TABLET, FILM COATED ORAL DAILY
Status: DISCONTINUED | OUTPATIENT
Start: 2024-07-15 | End: 2024-07-16 | Stop reason: HOSPADM

## 2024-07-15 RX ORDER — FENOFIBRATE 145 MG/1
145 TABLET, COATED ORAL DAILY
Status: DISCONTINUED | OUTPATIENT
Start: 2024-07-16 | End: 2024-07-15

## 2024-07-15 RX ORDER — IBUPROFEN 600 MG/1
1 TABLET ORAL
Status: DISCONTINUED | OUTPATIENT
Start: 2024-07-15 | End: 2024-07-16 | Stop reason: HOSPADM

## 2024-07-15 RX ORDER — AMOXICILLIN 250 MG
2 CAPSULE ORAL 2 TIMES DAILY PRN
Status: DISCONTINUED | OUTPATIENT
Start: 2024-07-15 | End: 2024-07-16 | Stop reason: HOSPADM

## 2024-07-15 RX ORDER — SPIRONOLACTONE 25 MG/1
50 TABLET ORAL DAILY
Status: DISCONTINUED | OUTPATIENT
Start: 2024-07-15 | End: 2024-07-16 | Stop reason: HOSPADM

## 2024-07-15 RX ORDER — SODIUM CHLORIDE 9 MG/ML
40 INJECTION, SOLUTION INTRAVENOUS AS NEEDED
Status: DISCONTINUED | OUTPATIENT
Start: 2024-07-15 | End: 2024-07-16 | Stop reason: HOSPADM

## 2024-07-15 RX ORDER — DOXYCYCLINE HYCLATE 100 MG/1
100 CAPSULE ORAL 2 TIMES DAILY
COMMUNITY
Start: 2024-07-12 | End: 2024-07-19

## 2024-07-15 RX ORDER — LISINOPRIL 10 MG/1
5 TABLET ORAL DAILY
Status: DISCONTINUED | OUTPATIENT
Start: 2024-07-15 | End: 2024-07-16 | Stop reason: HOSPADM

## 2024-07-15 RX ORDER — FENOFIBRATE 145 MG/1
145 TABLET, COATED ORAL DAILY
Status: DISCONTINUED | OUTPATIENT
Start: 2024-07-15 | End: 2024-07-16 | Stop reason: HOSPADM

## 2024-07-15 RX ORDER — LEVOTHYROXINE SODIUM 0.05 MG/1
100 TABLET ORAL DAILY
Status: DISCONTINUED | OUTPATIENT
Start: 2024-07-16 | End: 2024-07-16

## 2024-07-15 RX ORDER — LISINOPRIL 10 MG/1
5 TABLET ORAL DAILY
Status: DISCONTINUED | OUTPATIENT
Start: 2024-07-16 | End: 2024-07-15

## 2024-07-15 RX ORDER — SODIUM CHLORIDE 0.9 % (FLUSH) 0.9 %
10 SYRINGE (ML) INJECTION EVERY 12 HOURS SCHEDULED
Status: DISCONTINUED | OUTPATIENT
Start: 2024-07-15 | End: 2024-07-16 | Stop reason: HOSPADM

## 2024-07-15 RX ORDER — GLIPIZIDE 5 MG/1
5 TABLET ORAL
Status: DISCONTINUED | OUTPATIENT
Start: 2024-07-15 | End: 2024-07-16 | Stop reason: HOSPADM

## 2024-07-15 RX ORDER — INSULIN LISPRO 100 [IU]/ML
2-9 INJECTION, SOLUTION INTRAVENOUS; SUBCUTANEOUS
Status: DISCONTINUED | OUTPATIENT
Start: 2024-07-15 | End: 2024-07-16 | Stop reason: HOSPADM

## 2024-07-15 RX ORDER — MONTELUKAST SODIUM 10 MG/1
10 TABLET ORAL NIGHTLY
Status: DISCONTINUED | OUTPATIENT
Start: 2024-07-15 | End: 2024-07-16 | Stop reason: HOSPADM

## 2024-07-15 RX ORDER — SODIUM CHLORIDE 0.9 % (FLUSH) 0.9 %
10 SYRINGE (ML) INJECTION AS NEEDED
Status: DISCONTINUED | OUTPATIENT
Start: 2024-07-15 | End: 2024-07-16 | Stop reason: HOSPADM

## 2024-07-15 RX ORDER — NICOTINE POLACRILEX 4 MG
15 LOZENGE BUCCAL
Status: DISCONTINUED | OUTPATIENT
Start: 2024-07-15 | End: 2024-07-16 | Stop reason: HOSPADM

## 2024-07-15 RX ORDER — BISACODYL 5 MG/1
5 TABLET, DELAYED RELEASE ORAL DAILY PRN
Status: DISCONTINUED | OUTPATIENT
Start: 2024-07-15 | End: 2024-07-16 | Stop reason: HOSPADM

## 2024-07-15 RX ORDER — CETIRIZINE HYDROCHLORIDE 10 MG/1
10 TABLET ORAL DAILY
COMMUNITY

## 2024-07-15 RX ORDER — DOXYCYCLINE 100 MG/1
100 CAPSULE ORAL EVERY 12 HOURS SCHEDULED
Status: DISCONTINUED | OUTPATIENT
Start: 2024-07-15 | End: 2024-07-16 | Stop reason: HOSPADM

## 2024-07-15 RX ORDER — SPIRONOLACTONE 25 MG/1
50 TABLET ORAL DAILY
Status: DISCONTINUED | OUTPATIENT
Start: 2024-07-16 | End: 2024-07-15

## 2024-07-15 RX ORDER — POLYETHYLENE GLYCOL 3350 17 G/17G
17 POWDER, FOR SOLUTION ORAL DAILY PRN
Status: DISCONTINUED | OUTPATIENT
Start: 2024-07-15 | End: 2024-07-16 | Stop reason: HOSPADM

## 2024-07-15 RX ORDER — DEXTROSE MONOHYDRATE 25 G/50ML
25 INJECTION, SOLUTION INTRAVENOUS
Status: DISCONTINUED | OUTPATIENT
Start: 2024-07-15 | End: 2024-07-16 | Stop reason: HOSPADM

## 2024-07-15 RX ORDER — METOPROLOL SUCCINATE 50 MG/1
100 TABLET, EXTENDED RELEASE ORAL EVERY MORNING
Status: DISCONTINUED | OUTPATIENT
Start: 2024-07-16 | End: 2024-07-16 | Stop reason: HOSPADM

## 2024-07-15 RX ADMIN — Medication 10 ML: at 15:00

## 2024-07-15 RX ADMIN — Medication 10 ML: at 20:09

## 2024-07-15 RX ADMIN — FENOFIBRATE 145 MG: 145 TABLET ORAL at 22:11

## 2024-07-15 RX ADMIN — LISINOPRIL 5 MG: 10 TABLET ORAL at 21:07

## 2024-07-15 RX ADMIN — SPIRONOLACTONE 50 MG: 25 TABLET, FILM COATED ORAL at 21:07

## 2024-07-15 RX ADMIN — GLIPIZIDE 5 MG: 5 TABLET ORAL at 21:07

## 2024-07-15 RX ADMIN — GADOBENATE DIMEGLUMINE 18 ML: 529 INJECTION, SOLUTION INTRAVENOUS at 17:04

## 2024-07-15 RX ADMIN — DOXYCYCLINE 100 MG: 100 CAPSULE ORAL at 21:07

## 2024-07-15 RX ADMIN — MONTELUKAST SODIUM 10 MG: 10 TABLET, FILM COATED ORAL at 20:34

## 2024-07-15 NOTE — ED NOTES
Pt was at eye dr and was told her bilat optic nerve was swollen.  She feels like something has been blocking her peripheral vision x 4-6 weeks

## 2024-07-15 NOTE — PLAN OF CARE
Goal Outcome Evaluation:   Pt sent here from eye doc for further imaging. Pt has had visual changes for a couple months. Plan for MRI and neuro consult. VSS. Pt is alert and oriented x4. Stand by assist.

## 2024-07-15 NOTE — ED NOTES
".Nursing report ED to floor  Namrata Luz  68 y.o.  female    HPI :  HPI (Adult)  Stated Reason for Visit: eye problem  History Obtained From: patient    Chief Complaint  Chief Complaint   Patient presents with    Eye Problem       Admitting doctor:   Flash Singleton II, MD    Admitting diagnosis:   The primary encounter diagnosis was Vision changes. A diagnosis of Retinal edema of both eyes was also pertinent to this visit.    Code status:   Current Code Status       Date Active Code Status Order ID Comments User Context       Prior            Allergies:   Morphine and codeine, Penicillins, and Morphine    Isolation:   No active isolations    Intake and Output  No intake or output data in the 24 hours ending 07/15/24 1323    Weight:       07/15/24  1019   Weight: 88.5 kg (195 lb)       Most recent vitals:   Vitals:    07/15/24 1001 07/15/24 1019 07/15/24 1253 07/15/24 1303   BP: 140/80   118/74   Pulse:   72    Resp:       Temp:       TempSrc:       SpO2:   100%    Weight:  88.5 kg (195 lb)     Height:  172.7 cm (68\")         Active LDAs/IV Access:   Lines, Drains & Airways       Active LDAs       Name Placement date Placement time Site Days    Peripheral IV 07/15/24 1025 Right Antecubital 07/15/24  1025  Antecubital  less than 1                    Labs (abnormal labs have a star):   Labs Reviewed   COMPREHENSIVE METABOLIC PANEL - Abnormal; Notable for the following components:       Result Value    Sodium 135 (*)     CO2 21.5 (*)     ALT (SGPT) 34 (*)     All other components within normal limits    Narrative:     GFR Normal >60  Chronic Kidney Disease <60  Kidney Failure <15     CBC WITH AUTO DIFFERENTIAL - Abnormal; Notable for the following components:    Eosinophil % 10.1 (*)     Immature Grans % 0.6 (*)     Eosinophils, Absolute 0.94 (*)     Immature Grans, Absolute 0.06 (*)     All other components within normal limits   PROTIME-INR - Normal   APTT - Normal   CBC AND DIFFERENTIAL    Narrative:     The " following orders were created for panel order CBC & Differential.  Procedure                               Abnormality         Status                     ---------                               -----------         ------                     CBC Auto Differential[[id number]]        Abnormal            Final result                 Please view results for these tests on the individual orders.       EKG:   No orders to display       Meds given in ED:   Medications   sodium chloride 0.9 % flush 10 mL (has no administration in time range)       Imaging results:  CT Head Without Contrast    Result Date: 7/15/2024  1. There is beam hardening artifact off the patient's metallic left earring that streaks through the lateral left temporal lobe and anterior inferior left frontal lobe and limits evaluation. Taking this into consideration, no acute intracranial abnormality is identified.  2. There is mild medial left frontal sinus and bilateral anterior ethmoid sinus and bilateral sphenoid sinus mucosal thickening. There are bilateral intraocular lens implants in the globes from previous bilateral cataract surgery. The remainder of the head CT is within normal limits. The etiology of the patient's acute left-sided visual difficulty and dizziness is not established on this exam. If there remains any clinical consideration of an acute stroke I recommend an MRI of the brain for more complete assessment. The results and recommendations were discussed with Dr. Ceja from the ER by telephone on 07/15/2024 at 11 a.m.  Radiation dose reduction techniques were utilized, including automated exposure control and exposure modulation based on body size.        Ambulatory status:   - Assist    Social issues:   Social History     Socioeconomic History    Marital status:    Tobacco Use    Smoking status: Never    Smokeless tobacco: Never    Tobacco comments:     lots of second hand smoke during childhood   Vaping Use    Vaping status:  Never Used   Substance and Sexual Activity    Alcohol use: Yes     Alcohol/week: 0.0 standard drinks of alcohol     Comment: maybe have 1 drink every couple of months    Drug use: Never    Sexual activity: Defer       Peripheral Neurovascular  Peripheral Neurovascular (Adult)  Peripheral Neurovascular WDL: WDL    Neuro Cognitive  Neuro Cognitive (Adult)  Cognitive/Neuro/Behavioral WDL: WDL, orientation, speech  Orientation: oriented x 4  Speech: clear, logical  Additional Documentation: Hand /Ankle Strength (Group)  Pupils  Pupil PERRLA: other (see comments) (pt reports to ER following eye exm. pupils dilated)  Hand /Ankle Strength  Hand , Left: strong  Hand , Right: strong  Dorsiflexion, Left: strong  Dorsiflexion, Right: strong  Plantarflexion, Left: strong  Plantarflexion, Right: strong    Learning  Learning Assessment (Adult)  Learning Readiness and Ability: no barriers identified    Respiratory  Respiratory WDL  Respiratory WDL: WDL    Abdominal Pain       Pain Assessments  Pain (Adult)  (0-10) Pain Rating: Rest: 0  (0-10) Pain Rating: Activity: 0    Neal Lyles RN  07/15/24 13:23 EDT

## 2024-07-15 NOTE — ED PROVIDER NOTES
" EMERGENCY DEPARTMENT ENCOUNTER    Room Number:  10/10  PCP: Sujata Breen PA-C  Historian: Patient      HPI:  Chief Complaint: Visual deficits  A complete HPI/ROS/PMH/PSH/SH/FH are unobtainable due to: None  Context: Namrata Luz is a 68 y.o. female who presents to the ED c/o sudden onset and intermittent episodes of visual deficits/abnormalities mainly affecting her left eye that have been present for the last several weeks.  She did see her retinal specialist today for evaluation and was sent to the emergency room immediately due to \"swollen retinas bilaterally\".  Per the papers that were sent, she did have edematous redness and requested neuroimaging.  Currently, she denies any other associated symptoms including headache, chest pain, back pain, nausea/vomiting, speech abnormalities, or unilateral weakness/numbness.            PAST MEDICAL HISTORY  Active Ambulatory Problems     Diagnosis Date Noted    Asthma 04/18/2016    Mixed anxiety depressive disorder 04/18/2016    Hyperlipidemia 04/18/2016    Hypertension 04/18/2016    Hypothyroidism 04/18/2016    Lung nodule, solitary 04/18/2016    Psoriasis 04/18/2016    Vitamin D deficiency 04/18/2016    Type 2 diabetes mellitus without complication 12/06/2016    Chronic pain of left knee 05/17/2018    CAMEJO (nonalcoholic steatohepatitis) 07/08/2019    Allergic rhinitis     Bilateral carpal tunnel syndrome      Resolved Ambulatory Problems     Diagnosis Date Noted    Abnormal liver function tests 04/18/2016    Right medial knee pain 09/22/2016    Chronic pain of right knee 06/11/2020    Primary osteoarthritis of right knee 10/06/2020     Past Medical History:   Diagnosis Date    Endometriosis     Fractures 2007    Herpes zoster without complication     Renal insufficiency     Sleep apnea          PAST SURGICAL HISTORY  Past Surgical History:   Procedure Laterality Date    APPENDECTOMY      BACK SURGERY  1982    CATARACT EXTRACTION, BILATERAL Bilateral 2015    " CHOLECYSTECTOMY  'S    COLONOSCOPY      EYE SURGERY      HYSTERECTOMY  1982    JOINT REPLACEMENT      KNEE ARTHROPLASTY UNICOMPARTMENTAL Left 2018    Procedure: KNEE ARTHROPLASTY UNICOMPARTMENTAL;  Surgeon: Emanuel Jack MD;  Location: [facility] MAIN OR;  Service: Orthopedics    LUMBAR DISCECTOMY  1982    TIBIA FASCIOTOMY Left 2006    TOTAL KNEE ARTHROPLASTY Right 2020    Procedure: TOTAL KNEE ARTHROPLASTY;  Surgeon: Emanuel Jack MD;  Location: [facility] MAIN OR;  Service: Orthopedics;  Laterality: Right;    TUBAL ABDOMINAL LIGATION Bilateral          FAMILY HISTORY  Family History   Problem Relation Age of Onset    Vaginal cancer Mother     Lung cancer Mother     Arthritis Mother     Diabetes Mother             Cancer Mother         Lung    Coronary artery disease Father     Arthritis Father     Kidney cancer Father     Cancer Father         Kidney    Coronary artery disease Brother     Asthma Sister     Osteoporosis Sister     Osteoporosis Maternal Grandmother             Osteoporosis Sister         Being treated    Malig Hyperthermia Neg Hx          SOCIAL HISTORY  Social History     Socioeconomic History    Marital status:    Tobacco Use    Smoking status: Never    Smokeless tobacco: Never    Tobacco comments:     lots of second hand smoke during childhood   Vaping Use    Vaping status: Never Used   Substance and Sexual Activity    Alcohol use: Yes     Alcohol/week: 0.0 standard drinks of alcohol     Comment: maybe have 1 drink every couple of months    Drug use: Never    Sexual activity: Defer         ALLERGIES  Morphine and codeine, Penicillins, and Morphine        REVIEW OF SYSTEMS  Review of Systems   Constitutional:  Negative for fever.   HENT:  Negative for sore throat.    Eyes:  Positive for visual disturbance.   Respiratory:  Negative for cough and shortness of breath.    Cardiovascular:  Negative for chest pain.   Gastrointestinal:  Negative for abdominal pain,  diarrhea and vomiting.   Genitourinary:  Negative for dysuria.   Musculoskeletal:  Negative for neck pain.   Skin:  Negative for rash.   Allergic/Immunologic: Negative.    Neurological:  Negative for weakness, numbness and headaches.   Hematological: Negative.    Psychiatric/Behavioral: Negative.     All other systems reviewed and are negative.         PHYSICAL EXAM  ED Triage Vitals [07/15/24 0954]   Temp Heart Rate Resp BP SpO2   97.6 °F (36.4 °C) 89 16 -- 96 %      Temp src Heart Rate Source Patient Position BP Location FiO2 (%)   Tympanic Monitor -- -- --       Physical Exam  Constitutional:       General: She is not in acute distress.     Appearance: Normal appearance. She is not ill-appearing or toxic-appearing.   HENT:      Head: Normocephalic and atraumatic.   Eyes:      Extraocular Movements: Extraocular movements intact.      Pupils: Pupils are equal, round, and reactive to light.   Cardiovascular:      Rate and Rhythm: Normal rate and regular rhythm.      Heart sounds: No murmur heard.     No friction rub. No gallop.   Pulmonary:      Effort: Pulmonary effort is normal.      Breath sounds: Normal breath sounds.   Abdominal:      General: Abdomen is flat. There is no distension.      Palpations: Abdomen is soft.      Tenderness: There is no abdominal tenderness.   Musculoskeletal:         General: No swelling or tenderness. Normal range of motion.      Cervical back: Normal range of motion and neck supple.   Skin:     General: Skin is warm and dry.   Neurological:      General: No focal deficit present.      Mental Status: She is alert and oriented to person, place, and time.      Sensory: No sensory deficit.      Motor: No weakness.   Psychiatric:         Mood and Affect: Mood normal.         Behavior: Behavior normal.         Vital signs and nursing notes reviewed.          LAB RESULTS  Recent Results (from the past 24 hour(s))   Comprehensive Metabolic Panel    Collection Time: 07/15/24 10:26 AM     Specimen: Blood   Result Value Ref Range    Glucose 78 65 - 99 mg/dL    BUN 12 8 - 23 mg/dL    Creatinine 0.85 0.57 - 1.00 mg/dL    Sodium 135 (L) 136 - 145 mmol/L    Potassium 4.5 3.5 - 5.2 mmol/L    Chloride 102 98 - 107 mmol/L    CO2 21.5 (L) 22.0 - 29.0 mmol/L    Calcium 9.6 8.6 - 10.5 mg/dL    Total Protein 7.3 6.0 - 8.5 g/dL    Albumin 4.4 3.5 - 5.2 g/dL    ALT (SGPT) 34 (H) 1 - 33 U/L    AST (SGOT) 26 1 - 32 U/L    Alkaline Phosphatase 81 39 - 117 U/L    Total Bilirubin 0.4 0.0 - 1.2 mg/dL    Globulin 2.9 gm/dL    A/G Ratio 1.5 g/dL    BUN/Creatinine Ratio 14.1 7.0 - 25.0    Anion Gap 11.5 5.0 - 15.0 mmol/L    eGFR 74.7 >60.0 mL/min/1.73   Protime-INR    Collection Time: 07/15/24 10:26 AM    Specimen: Blood   Result Value Ref Range    Protime 13.7 11.7 - 14.2 Seconds    INR 1.03 0.90 - 1.10   aPTT    Collection Time: 07/15/24 10:26 AM    Specimen: Blood   Result Value Ref Range    PTT 29.9 22.7 - 35.4 seconds   CBC Auto Differential    Collection Time: 07/15/24 10:26 AM    Specimen: Blood   Result Value Ref Range    WBC 9.30 3.40 - 10.80 10*3/mm3    RBC 5.12 3.77 - 5.28 10*6/mm3    Hemoglobin 14.9 12.0 - 15.9 g/dL    Hematocrit 45.7 34.0 - 46.6 %    MCV 89.3 79.0 - 97.0 fL    MCH 29.1 26.6 - 33.0 pg    MCHC 32.6 31.5 - 35.7 g/dL    RDW 14.0 12.3 - 15.4 %    RDW-SD 45.3 37.0 - 54.0 fl    MPV 9.4 6.0 - 12.0 fL    Platelets 333 140 - 450 10*3/mm3    Neutrophil % 61.0 42.7 - 76.0 %    Lymphocyte % 20.9 19.6 - 45.3 %    Monocyte % 6.5 5.0 - 12.0 %    Eosinophil % 10.1 (H) 0.3 - 6.2 %    Basophil % 0.9 0.0 - 1.5 %    Immature Grans % 0.6 (H) 0.0 - 0.5 %    Neutrophils, Absolute 5.68 1.70 - 7.00 10*3/mm3    Lymphocytes, Absolute 1.94 0.70 - 3.10 10*3/mm3    Monocytes, Absolute 0.60 0.10 - 0.90 10*3/mm3    Eosinophils, Absolute 0.94 (H) 0.00 - 0.40 10*3/mm3    Basophils, Absolute 0.08 0.00 - 0.20 10*3/mm3    Immature Grans, Absolute 0.06 (H) 0.00 - 0.05 10*3/mm3    nRBC 0.0 0.0 - 0.2 /100 WBC       Ordered the above  labs and reviewed the results.        RADIOLOGY  CT Head Without Contrast    Result Date: 7/15/2024  EMERGENCY CT SCAN OF THE HEAD WITHOUT CONTRAST ON 07/15/2024  CLINICAL HISTORY: This is a 68-year-old female patient with history of left-sided visual difficulty and some dizziness.  TECHNIQUE: Spiral CT images were obtained from the base of the skull to the vertex without intravenous contrast. The images were reformatted and are submitted in 3 mm thick axial, sagittal and coronal CT sections with brain algorithm.  There are no prior head CTs or MRIs of the brain from [facility] for comparison.  FINDINGS: The patient has a metal left earring in place and there is beam hardening artifact off the earring that streaks through the lateral left temporal lobe and a portion of the anterior inferior left frontal parenchyma and limits evaluation. Taking this into consideration, the brain parenchyma is normal in attenuation. The ventricles are normal in size. I see no focal mass effect. There is no midline shift. No extra-axial fluid collections are identified. There is no evidence of acute intracranial hemorrhage. There is mild medial right frontal sinus and bilateral ethmoid sinus and bilateral sphenoid sinus mucosal thickening. The mastoid and middle ear cavities are clear. Bilateral intraocular lens implants are in place in the globes from previous bilateral cataract surgery. Otherwise, the orbits are unremarkable.      1. There is beam hardening artifact off the patient's metallic left earring that streaks through the lateral left temporal lobe and anterior inferior left frontal lobe and limits evaluation. Taking this into consideration, no acute intracranial abnormality is identified.  2. There is mild medial left frontal sinus and bilateral anterior ethmoid sinus and bilateral sphenoid sinus mucosal thickening. There are bilateral intraocular lens implants in the globes from previous bilateral cataract  surgery. The remainder of the head CT is within normal limits. The etiology of the patient's acute left-sided visual difficulty and dizziness is not established on this exam. If there remains any clinical consideration of an acute stroke I recommend an MRI of the brain for more complete assessment. The results and recommendations were discussed with Dr. Ceja from the ER by telephone on 07/15/2024 at 11 a.m.  Radiation dose reduction techniques were utilized, including automated exposure control and exposure modulation based on body size.        Ordered the above noted radiological studies. Reviewed by me in PACS.            PROCEDURES  Procedures          MEDICATIONS GIVEN IN ER  Medications   sodium chloride 0.9 % flush 10 mL (has no administration in time range)                   MEDICAL DECISION MAKING, PROGRESS, and CONSULTS    All labs have been independently reviewed by me.  All radiology studies have been reviewed by me and I have also reviewed the radiology report.   EKG's independently viewed and interpreted by me.  Discussion below represents my analysis of pertinent findings related to patient's condition, differential diagnosis, treatment plan and final disposition.      Additional sources:  - Discussed/ obtained information from independent historians: History obtained from the patient as well as the patient's family at bedside.    - External (non-ED) record review: Upon medical records review, the patient was last seen and evaluated in the outpatient office of internal medicine on 2/19/2024 for her annual wellness visit as well as in follow-up for her known type 2 diabetes mellitus.    - Chronic or social conditions impacting care: Type 2 diabetes mellitus    - Shared decision making: Admission decision based on shared conversations have between myself, the patient and family at bedside, as well as J Carlos KARIMI.      Orders placed during this visit:  Orders Placed This Encounter   Procedures     CT Head Without Contrast    Comprehensive Metabolic Panel    Protime-INR    aPTT    CBC Auto Differential    Insert Peripheral IV    Initiate ED Observation Status    CBC & Differential             Differential diagnosis includes but is not limited to:    Diabetic retinopathy, retinal detachment, glaucoma, acute CVA, TIA, intracranial hemorrhage, intracranial mass effect, or electrolyte disturbance      Independent interpretation of labs, radiology studies, and discussions with consultants:    Head CT independently interpreted by myself with my interpretation showing no area of acute ischemia/infarct, hemorrhage, or mass effect.      ED Course as of 07/15/24 1252   Mon Jul 15, 2024   1235 On reevaluation, the patient is resting comfortably and without any further complaints.  I did inform her that her labs as well as head CT are unremarkable.  At this point, would like to admit her to the observation unit for likely MRI evaluation as well as potential neurologic evaluation.  She agrees with that plan and all questions answered. [BM]   1251 The patient's presentation, workup, as well as diagnosis and treatment plan was discussed at length with SACHI Brown.  He agrees to admit the patient to the observation unit today with Dr. Singleton. [BM]      ED Course User Index  [BM] Robin Ceja MD           DIAGNOSIS  Final diagnoses:   Vision changes   Retinal edema of both eyes         DISPOSITION  ADMISSION    Discussed treatment plan and reason for admission with pt/family and admitting physician.  Pt/family voiced understanding of the plan for admission for further testing/treatment as needed.               Latest Documented Vital Signs:  As of 12:52 EDT  BP- 140/80 HR- 89 Temp- 97.6 °F (36.4 °C) (Tympanic) O2 sat- 96%              --    Please note that portions of this were completed with a voice recognition program.       Note Disclaimer: At [facility], we believe that sharing information builds trust  and better relationships. You are receiving this note because you are receiving care at [facility] or recently visited. It is possible you will see health information before a provider has talked with you about it. This kind of information can be easy to misunderstand. To help you fully understand what it means for your health, we urge you to discuss this note with your provider.             Robin Ceja MD  07/15/24 6820

## 2024-07-15 NOTE — H&P
" [facility]   HISTORY AND PHYSICAL    Patient Name: Namrata Luz  : 1955  MRN: [id number]  Primary Care Physician:  Sujata Breen PA-C  Date of admission: 7/15/2024    Subjective   Subjective     Chief Complaint: Acute left-sided visual difficulty     HPI:    Namrata Luz is a 68 y.o. female with PMH including but not limited to HLD, HTN, DM2, CAMEJO and hypothyroidism presents to [facility] with left-sided peripheral visual field disturbance.  Patient reports intermittent left peripheral visual field loss for the past 2 months however for the past few days symptoms progressively worsened and became constant.  Patient states that she saw her ophthalmologist this morning and was told that her \"  Optic nerve inflamed \"bilaterally.  Denies associated headache, lightheadedness, phobia, dysarthria, or aphasia.  Denies unilateral weakness.  Denies chest pain, palpitation or shortness of breath.  Denies abdominal pain, nausea, vomiting, or diarrhea.    ED workup reviewed: Sodium 135, CO2 21.5, creatinine .85 CBC unremarkable.  CT head without shows no evidence of acute infarct.  There is bilateral anterior ethmoid sinus and bilateral sphenoid sinus mucosal thickening.    Review of Systems   All systems were reviewed and negative except for: That mentioned above in HPI    Personal History     Past Medical History:   Diagnosis Date    Allergic rhinitis     Asthma     Bilateral carpal tunnel syndrome     Endometriosis     Fractures     Herpes zoster without complication     Hyperlipidemia     Hypertension     ON TOPROL for a diagnosis of MVP but was later determined no MVp and MD decided not to stop since she has been on a while. lisinopril is prescribed for diabetes    CAMEJO (nonalcoholic steatohepatitis)     Primary osteoarthritis of right knee 10/06/2020    Psoriasis     Renal insufficiency     Sleep apnea     USES C-PAP    Vitamin D deficiency        Past Surgical History:   Procedure " Laterality Date    APPENDECTOMY      BACK SURGERY  1982    CATARACT EXTRACTION, BILATERAL Bilateral 2015    CHOLECYSTECTOMY  2000'S    COLONOSCOPY      EYE SURGERY      HYSTERECTOMY  1982    JOINT REPLACEMENT      KNEE ARTHROPLASTY UNICOMPARTMENTAL Left 6/1/2018    Procedure: KNEE ARTHROPLASTY UNICOMPARTMENTAL;  Surgeon: Emanuel Jack MD;  Location: [facility];  Service: Orthopedics    LUMBAR DISCECTOMY  1982    TIBIA FASCIOTOMY Left 2006    TOTAL KNEE ARTHROPLASTY Right 12/18/2020    Procedure: TOTAL KNEE ARTHROPLASTY;  Surgeon: Emanuel Jack MD;  Location: [facility];  Service: Orthopedics;  Laterality: Right;    TUBAL ABDOMINAL LIGATION Bilateral 1981       Family History: family history includes Arthritis in her father and mother; Asthma in her sister; Cancer in her father and mother; Coronary artery disease in her brother and father; Diabetes in her mother; Kidney cancer in her father; Lung cancer in her mother; Osteoporosis in her maternal grandmother, sister, and sister; Vaginal cancer in her mother. Otherwise pertinent FHx was reviewed and not pertinent to current issue.    Social History:  reports that she has never smoked. She has never used smokeless tobacco. She reports current alcohol use. She reports that she does not use drugs.    Home Medications:  Accu-Chek Softclix Lancets, Scopolamine, Semaglutide (2 MG/DOSE), albuterol sulfate HFA, atorvastatin, cetirizine, clobetasol, doxycycline, empagliflozin, fenofibrate, fluocinolone, glipizide, glucose blood, glucose monitor, levothyroxine, lisinopril, metFORMIN, metoprolol succinate XL, montelukast, nystatin, and spironolactone    Allergies:  Allergies   Allergen Reactions    Morphine And Codeine Hives    Penicillins Hives    Morphine Hives       Objective   Objective     Vitals:   Temp:  [97.5 °F (36.4 °C)-97.6 °F (36.4 °C)] 97.5 °F (36.4 °C)  Heart Rate:  [64-89] 64  Resp:  [16-18] 18  BP: (116-140)/(60-81) 125/81  Physical  Exam    Constitutional: Awake, alert   Eyes: PERRLA, sclerae anicteric, no conjunctival injection   HENT: NCAT, mucous membranes moist   Neck: Supple, no thyromegaly, no lymphadenopathy, trachea midline   Respiratory: Clear to auscultation bilaterally, nonlabored respirations    Cardiovascular: RRR, no murmurs, rubs, or gallops, palpable pedal pulses bilaterally   Gastrointestinal: Positive bowel sounds, soft, nontender, nondistended   Musculoskeletal: No bilateral ankle edema, no clubbing or cyanosis to extremities   Psychiatric: Appropriate affect, cooperative   Neurologic: Oriented x 3, strength symmetric in all extremities, Cranial Nerves grossly intact to confrontation, speech clear   Skin: No rashes     Result Review    Result Review:  I have personally reviewed the results from the time of this admission to 7/15/2024 14:37 EDT and agree with these findings:  []  Laboratory list / accordion  []  Microbiology  []  Radiology  []  EKG/Telemetry   []  Cardiology/Vascular   []  Pathology  []  Old records  []  Other:        Assessment & Plan   Assessment / Plan     Brief Patient Summary:  Namrata Luz is a 68 y.o. female who admitted to the observation unit due to acute left-sided visual field disturbance    Active Hospital Problems:  Active Hospital Problems    Diagnosis     **Vision disturbance      Plan:     Acute left-sided visual field disturbance  -CT head without negative acute  -MRI brain with and without  -Neurology consult  -Neurocheck every 4 hours    DM2  -Accu-Chek before meals and at bedtime  -Insulin sliding scale    Hypertension  -Continue lisinopril  -Vital signs per nursing      VTE Prophylaxis:  Mechanical VTE prophylaxis orders are present.        CODE STATUS:    Level Of Support Discussed With: Patient  Code Status (Patient has no pulse and is not breathing): CPR (Attempt to Resuscitate)  Medical Interventions (Patient has pulse or is breathing): Full Support    Admission Status:  I believe  this patient meets observation status.    During patient visit, I utilized appropriate personal protective equipment including gloves. Appropriate PPE was worn during the entire visit.  Hand hygiene was completed before and after    71 minutes has been spent by [facility] providers in the care of this patient while under observation status    Electronically signed by SACHI Locke, 07/15/24, 2:37 PM EDT.

## 2024-07-15 NOTE — CONSULTS
Pt awake, welcomed [facility] hospitality visit; Pt requested prayer; prayer support provided; appreciation expressed. No further pastoral care needs assessed at this time. Chaplains remain available.

## 2024-07-15 NOTE — PROGRESS NOTES
Clinical Pharmacy Services: Medication History    Namrata Luz is a 68 y.o. female presenting to [facility] for   Chief Complaint   Patient presents with    Eye Problem       She  has a past medical history of Allergic rhinitis, Asthma, Bilateral carpal tunnel syndrome, Endometriosis, Fractures (2007), Herpes zoster without complication, Hyperlipidemia, Hypertension, CAMEJO (nonalcoholic steatohepatitis), Primary osteoarthritis of right knee (10/06/2020), Psoriasis, Renal insufficiency, Sleep apnea, and Vitamin D deficiency.    Allergies as of 07/15/2024 - Reviewed 07/15/2024   Allergen Reaction Noted    Morphine and codeine Hives 04/18/2016    Penicillins Hives 04/18/2016    Morphine Hives 09/15/2019       Medication information was obtained from: Patient   Pharmacy and Phone Number:     Prior to Admission Medications       Prescriptions Last Dose Informant Patient Reported? Taking?    atorvastatin (LIPITOR) 40 MG tablet  Self No Yes    TAKE 1 TABLET BY MOUTH ONCE DAILY AS DIRECTED    Patient taking differently:  Take 0.5 tablets by mouth Daily.    cetirizine (zyrTEC) 10 MG tablet  Self Yes Yes    Take 1 tablet by mouth Daily.    doxycycline (VIBRAMYCIN) 100 MG capsule  Self Yes Yes    Take 1 capsule by mouth 2 (Two) Times a Day.    empagliflozin (Jardiance) 25 MG tablet tablet  Self No Yes    Take 1 tablet by mouth once daily    Patient taking differently:  Take 1 tablet by mouth Daily.    fenofibrate (TRICOR) 145 MG tablet  Self No Yes    Take 1 tablet by mouth once daily    Patient taking differently:  Take 1 tablet by mouth Daily.    glipizide (GLUCOTROL XL) 10 MG 24 hr tablet  Self No Yes    Take 1 tablet by mouth once daily    levothyroxine (SYNTHROID, LEVOTHROID) 100 MCG tablet  Self No Yes    Take 1 tablet by mouth once daily    lisinopril (PRINIVIL,ZESTRIL) 5 MG tablet  Self No Yes    Take 1 tablet by mouth once daily    Patient taking differently:  Take 1 tablet by mouth Daily.    metFORMIN  (GLUCOPHAGE) 500 MG tablet  Self No Yes    TAKE 1 TABLET BY MOUTH TWICE DAILY WITH MEALS    metoprolol succinate XL (TOPROL-XL) 100 MG 24 hr tablet  Self No Yes    Take 1 tablet by mouth Every Morning.    montelukast (SINGULAIR) 10 MG tablet  Self No Yes    TAKE 1 TABLET BY MOUTH ONCE DAILY AT NIGHT    Semaglutide, 2 MG/DOSE, (Ozempic, 2 MG/DOSE,) 8 MG/3ML solution pen-injector  Self No Yes    Inject 2 mg under the skin into the appropriate area as directed 1 (One) Time Per Week.    spironolactone (ALDACTONE) 50 MG tablet  Self No Yes    Take 1 tablet by mouth once daily    Patient taking differently:  Take 1 tablet by mouth Daily.    Accu-Chek Tavia Plus test strip   No No    USE AS DIRECTED TO TEST BLOOD SUGAR once DAILY    Accu-Chek Tavia Plus test strip   No No    daily    Accu-Chek Softclix Lancets lancets   No No    USE AS DIRECTED TO TEST BLOOD SUGAR DAILY    albuterol sulfate  (90 Base) MCG/ACT inhaler   No No    Inhale 2 puffs Every 4 (Four) Hours As Needed for Wheezing.    clobetasol (TEMOVATE) 0.05 % external solution   No No    Apply 1 application topically to the appropriate area as directed As Needed (for psoriasis).    fluocinolone (SYNALAR) 0.01 % external solution   No No    Apply  topically to the appropriate area as directed 2 (Two) Times a Day.    glucose monitor monitoring kit   No No    Use 1 each As Needed (test as directed). accu check avia plus meter    nystatin (MYCOSTATIN) 151884 UNIT/GM powder   No No    Apply  topically to the appropriate area as directed 4 (Four) Times a Day.    Scopolamine 1 MG/3DAYS patch   No No    Place 1 patch on the skin as directed by provider Every 72 (Seventy-Two) Hours.              Medication notes: Patient stated she picked up her doxycycline Saturday 7/13/24 and took her first dose Saturday night.      This medication list is complete to the best of my knowledge as of 7/15/2024    Please call if questions.    Nithya Alicea  Medication History  Technician   [phone]    7/15/2024 14:09 EDT

## 2024-07-16 ENCOUNTER — APPOINTMENT (OUTPATIENT)
Dept: MRI IMAGING | Facility: HOSPITAL | Age: 69
End: 2024-07-16
Payer: MEDICARE

## 2024-07-16 ENCOUNTER — APPOINTMENT (OUTPATIENT)
Dept: GENERAL RADIOLOGY | Facility: HOSPITAL | Age: 69
End: 2024-07-16
Payer: MEDICARE

## 2024-07-16 ENCOUNTER — READMISSION MANAGEMENT (OUTPATIENT)
Dept: CALL CENTER | Facility: HOSPITAL | Age: 69
End: 2024-07-16
Payer: MEDICARE

## 2024-07-16 VITALS
DIASTOLIC BLOOD PRESSURE: 84 MMHG | HEIGHT: 68 IN | RESPIRATION RATE: 17 BRPM | HEART RATE: 71 BPM | TEMPERATURE: 98.1 F | OXYGEN SATURATION: 99 % | SYSTOLIC BLOOD PRESSURE: 109 MMHG | WEIGHT: 195 LBS | BODY MASS INDEX: 29.55 KG/M2

## 2024-07-16 PROBLEM — H53.9 VISION DISTURBANCE: Status: RESOLVED | Noted: 2024-07-15 | Resolved: 2024-07-16

## 2024-07-16 LAB
APPEARANCE CSF: CLEAR
APPEARANCE CSF: CLEAR
COLOR CSF: COLORLESS
COLOR CSF: COLORLESS
CRP SERPL-MCNC: <0.3 MG/DL (ref 0–0.5)
ERYTHROCYTE [SEDIMENTATION RATE] IN BLOOD: 10 MM/HR (ref 0–30)
GLUCOSE BLDC GLUCOMTR-MCNC: 114 MG/DL (ref 70–130)
GLUCOSE BLDC GLUCOMTR-MCNC: 81 MG/DL (ref 70–130)
GLUCOSE BLDC GLUCOMTR-MCNC: 95 MG/DL (ref 70–130)
GLUCOSE CSF-MCNC: 48 MG/DL (ref 40–70)
HOLD SPECIMEN: NORMAL
NUC CELL # CSF MANUAL: 1 /MM3 (ref 0–5)
NUC CELL # CSF MANUAL: 1 /MM3 (ref 0–5)
PROT CSF-MCNC: 43.2 MG/DL (ref 15–45)
RBC # CSF MANUAL: 0 /MM3 (ref 0–0)
RBC # CSF MANUAL: 3 /MM3 (ref 0–0)
TUBE # CSF: 1
TUBE # CSF: 3

## 2024-07-16 PROCEDURE — 83916 OLIGOCLONAL BANDS: CPT | Performed by: STUDENT IN AN ORGANIZED HEALTH CARE EDUCATION/TRAINING PROGRAM

## 2024-07-16 PROCEDURE — 89050 BODY FLUID CELL COUNT: CPT | Performed by: STUDENT IN AN ORGANIZED HEALTH CARE EDUCATION/TRAINING PROGRAM

## 2024-07-16 PROCEDURE — 70543 MRI ORBT/FAC/NCK W/O &W/DYE: CPT

## 2024-07-16 PROCEDURE — 0 GADOBENATE DIMEGLUMINE 529 MG/ML SOLUTION: Performed by: EMERGENCY MEDICINE

## 2024-07-16 PROCEDURE — 86362 MOG-IGG1 ANTB CBA EACH: CPT | Performed by: STUDENT IN AN ORGANIZED HEALTH CARE EDUCATION/TRAINING PROGRAM

## 2024-07-16 PROCEDURE — 86052 AQUAPORIN-4 ANTB CBA EACH: CPT | Performed by: STUDENT IN AN ORGANIZED HEALTH CARE EDUCATION/TRAINING PROGRAM

## 2024-07-16 PROCEDURE — G0378 HOSPITAL OBSERVATION PER HR: HCPCS

## 2024-07-16 PROCEDURE — 82945 GLUCOSE OTHER FLUID: CPT | Performed by: STUDENT IN AN ORGANIZED HEALTH CARE EDUCATION/TRAINING PROGRAM

## 2024-07-16 PROCEDURE — A9577 INJ MULTIHANCE: HCPCS | Performed by: EMERGENCY MEDICINE

## 2024-07-16 PROCEDURE — 84157 ASSAY OF PROTEIN OTHER: CPT | Performed by: STUDENT IN AN ORGANIZED HEALTH CARE EDUCATION/TRAINING PROGRAM

## 2024-07-16 PROCEDURE — 25010000002 LIDOCAINE 1 % SOLUTION: Performed by: NURSE PRACTITIONER

## 2024-07-16 PROCEDURE — 99214 OFFICE O/P EST MOD 30 MIN: CPT | Performed by: STUDENT IN AN ORGANIZED HEALTH CARE EDUCATION/TRAINING PROGRAM

## 2024-07-16 PROCEDURE — 82948 REAGENT STRIP/BLOOD GLUCOSE: CPT

## 2024-07-16 RX ORDER — ACETAMINOPHEN 325 MG/1
650 TABLET ORAL EVERY 6 HOURS PRN
Status: DISCONTINUED | OUTPATIENT
Start: 2024-07-16 | End: 2024-07-16 | Stop reason: HOSPADM

## 2024-07-16 RX ORDER — LIDOCAINE HYDROCHLORIDE 10 MG/ML
10 INJECTION, SOLUTION INFILTRATION; PERINEURAL ONCE
Status: COMPLETED | OUTPATIENT
Start: 2024-07-16 | End: 2024-07-16

## 2024-07-16 RX ORDER — LEVOTHYROXINE SODIUM 0.05 MG/1
100 TABLET ORAL DAILY
Status: DISCONTINUED | OUTPATIENT
Start: 2024-07-16 | End: 2024-07-16 | Stop reason: HOSPADM

## 2024-07-16 RX ORDER — CETIRIZINE HYDROCHLORIDE 10 MG/1
10 TABLET ORAL DAILY
Status: DISCONTINUED | OUTPATIENT
Start: 2024-07-16 | End: 2024-07-16 | Stop reason: HOSPADM

## 2024-07-16 RX ADMIN — EMPAGLIFLOZIN 25 MG: 25 TABLET, FILM COATED ORAL at 06:06

## 2024-07-16 RX ADMIN — METOPROLOL SUCCINATE 100 MG: 50 TABLET, FILM COATED, EXTENDED RELEASE ORAL at 06:05

## 2024-07-16 RX ADMIN — CETIRIZINE HYDROCHLORIDE 10 MG: 10 TABLET ORAL at 06:05

## 2024-07-16 RX ADMIN — LIDOCAINE HYDROCHLORIDE 5 ML: 10 INJECTION, SOLUTION INFILTRATION; PERINEURAL at 11:13

## 2024-07-16 RX ADMIN — Medication 10 ML: at 08:51

## 2024-07-16 RX ADMIN — ATORVASTATIN CALCIUM 20 MG: 20 TABLET, FILM COATED ORAL at 06:05

## 2024-07-16 RX ADMIN — GADOBENATE DIMEGLUMINE 18 ML: 529 INJECTION, SOLUTION INTRAVENOUS at 17:02

## 2024-07-16 RX ADMIN — LEVOTHYROXINE SODIUM 100 MCG: 50 TABLET ORAL at 05:46

## 2024-07-16 RX ADMIN — DOXYCYCLINE 100 MG: 100 CAPSULE ORAL at 08:50

## 2024-07-16 RX ADMIN — ACETAMINOPHEN 325MG 650 MG: 325 TABLET ORAL at 05:30

## 2024-07-16 NOTE — PROGRESS NOTES
Pt admitted to the observation from the emergency department with concerns from her ophthalmologist about optic nerve swelling.  Has had several months of progressive changes left greater than right in her vision.  Denies any eye pain, no dizziness, no headaches.  No numbness or weakness of the arms or legs.  Was told to come emergently to the ER from her ophthalmology team.     On exam,   General: No acute distress, nontoxic  HEENT: EOMI, PERRL  Pulm: Symmetric chest rise, nonlabored breathing  CV: Regular rate and rhythm  GI: Nondistended  MSK: No deformity  Skin: Warm, dry  Neuro: Awake, alert, oriented x 4, no facial droop, no dysarthria aphasia, moving all extremities, no focal deficits  Psych: Calm, cooperative    Vital signs and nursing notes reviewed.           Plan: MRI brain with and without contrast showing chronic small vessel ischemic phenomenon, mild to moderate inflammatory paranasal sinus disease within the maxillary, ethmoid, frontal, and sphenoid sinuses.  Labs are reassuring, currently awaiting neurology evaluation recommendations, potential for discharge today pending any further testing from neurology.  All questions or concerns addressed.         MD Attestation Note    SHARED VISIT: This visit was performed by BOTH a physician and an APC. The substantive portion of the medical decision making was performed by this attesting physician who made or approved the management plan and takes responsibility for patient management. All studies in the APC note (if performed) were independently interpreted by me.

## 2024-07-16 NOTE — PLAN OF CARE
Goal Outcome Evaluation:  Plan of Care Reviewed With: patient        Progress: no change  Outcome Evaluation: Assumed care of patient. Patient is alert and oriented x4, up ad hector, on RA. VSS.On assessment, patient still reported decrease in peripheral vision on her left eye. Patient informed this RN that she takes her medications differently and she added other medications that she has been taking at home. Provider was notified and has fixed patient's med list. MRI brain has resulted and ED Physician has spoken with patient at bedside. Await Neuro review in the morning. Maintained on NPO after midnight. Plan of care ongoing.

## 2024-07-16 NOTE — CONSULTS
"Neurology Consult Note    Consult Date: 7/16/2024    Referring MD: Brandon Bahena MD    Reason for Consult I have been asked to see the patient in neurological consultation to render advice and opinion regarding peripheral vision problems    Namrata Luz is a 68 y.o. female past medical history of hypertension, hyperlipidemia, type 2 diabetes mellitus, CAMEJO, hypothyroidism presented to the ER with left peripheral visual disturbance ongoing for the past 2 months intermittently neurology was consulted for the same.  Patient states for her left peripheral visual disturbances which she describes as cloudy vision along the left eye lateral part no associated headaches weakness numbness speech problems she went to a retinal specialist yesterday who told her that she had bilateral optic disc edema and was sent to the ER for further evaluation.        Past Medical History:   Diagnosis Date    Allergic rhinitis     Asthma     Bilateral carpal tunnel syndrome     Endometriosis     Fractures 2007    Herpes zoster without complication     Hyperlipidemia     Hypertension     ON TOPROL for a diagnosis of MVP but was later determined no MVp and MD decided not to stop since she has been on a while. lisinopril is prescribed for diabetes    CAMEJO (nonalcoholic steatohepatitis)     Primary osteoarthritis of right knee 10/06/2020    Psoriasis     Renal insufficiency     Sleep apnea     USES C-PAP    Vitamin D deficiency        Exam  /73 (BP Location: Right arm, Patient Position: Lying)   Pulse 71   Temp 97.9 °F (36.6 °C) (Oral)   Resp 18   Ht 172.7 cm (68\")   Wt 88.5 kg (195 lb)   SpO2 99%   BMI 29.65 kg/m²   Gen: NAD, vitals reviewed  MS: oriented x3, recent/remote memory intact, normal attention/concentration, language intact, no neglect.  CN: Right eye visual acuity normal left eye she has blurry vision along the temporal upper and lower quadrant, PERRL, EOMI, no facial droop, no dysarthria  Motor: 5/5 throughout " upper and lower extremities, normal tone    DATA:    Lab Results   Component Value Date    GLUCOSE 78 07/15/2024    CALCIUM 9.6 07/15/2024     (L) 07/15/2024    K 4.5 07/15/2024    CO2 21.5 (L) 07/15/2024     07/15/2024    BUN 12 07/15/2024    CREATININE 0.85 07/15/2024    EGFRIFAFRI 74 06/04/2021    EGFRIFNONA 64 06/04/2021    BCR 14.1 07/15/2024    ANIONGAP 11.5 07/15/2024     Lab Results   Component Value Date    WBC 9.30 07/15/2024    HGB 14.9 07/15/2024    HCT 45.7 07/15/2024    MCV 89.3 07/15/2024     07/15/2024       Lab review:   Sodium 135  Creatinine 0.85  Blood glucose 78  AST 26 and ALT 24    A1c 6.4  LDL 74    WBC 9.3  Hemoglobin 14.9 and platelets 333      Imaging review:   CT head showed no acute hypodensity or hypodensity    MRI brain with and without no acute diffusion restriction mild T2 flair changes no GRE changes, small synovial cyst but I believe this is not causing any of her symptoms.    Diagnoses:  Left eye blurry vision along with bilateral optic disc edema (Mild)    Hypertension  Hyperlipidemia  Type 2 diabetes mellitus    Pre-stroke MRS: 0  NIHSS: 1    Plan  I am unsure about the etiology of the optic disc edema bilaterally, she is 68 years old has no multiple sclerosis changes on the MRI, MRI brain also ruled out space-occupying lesions, will check dedicated MRI orbits and also get a lumbar puncture to rule out pseudotumor cerebri without headache.  MRI orbits with and without  Lumbar puncture with opening pressure  Also ordered NMO/oligoclonal bands  She has no features of temporal arteritis ESR CRP pending    We will continue to follow      MDM   Reviewed: Previous charts, nursing notes and vitals   Reviewed: Previous labs and CT/MRI orbits scan    Interpretation: Labs and CT/MRI Orbits scan   Total time providing care is :30-74 minutes. This excluded time spent performing separately reportable procedures and services  Consults :Neurology/Stroke    Please note that  portions of this note were completed with a voice recognition program.     Arnoldo Valencia MD  Neuro Hospitalist /Vascular Neurology.

## 2024-07-16 NOTE — PROGRESS NOTES
ED OBSERVATION PROGRESS/DISCHARGE SUMMARY    Date of Admission: 7/15/2024   LOS: 0 days   PCP: Sujata Breen PA-C          Subjective     Hospital Outcome:   68-year-old female admitted to the observation unit for further evaluation and treatment of her left-sided peripheral visual field disturbance.  Initial workup in the emergency department shows a sodium of 135, all other lab work is at baseline for the patient.  CT head without contrast shows no acute intracranial abnormality identified.  MRI brain with and without is unremarkable.    Neurology evaluated patient and has ordered lumbar puncture and MRI orbit face neck with and without. This was found to be reassuring. Her LP was reassuring and discussed with Dr. Valencia who recommends further outpatient follow up. Patient is agreeable to plan.     ROS:  General: no fevers, chills  Respiratory: no cough, dyspnea  Cardiovascular: no chest pain, palpitations  Abdomen: No abdominal pain, nausea, vomiting, or diarrhea  Neurologic: No focal weakness    Objective   Physical Exam:  I have reviewed the vital signs.  Temp:  [97.5 °F (36.4 °C)-98.1 °F (36.7 °C)] 97.9 °F (36.6 °C)  Heart Rate:  [63-89] 63  Resp:  [16-18] 18  BP: (112-140)/(60-81) 119/64  General Appearance:    Alert, cooperative, no distress  Head:    Normocephalic, atraumatic  Eyes:    Sclerae anicteric  Neck:   Supple, no mass  Lungs: Clear to auscultation bilaterally, respirations unlabored  Heart: Regular rate and rhythm, S1 and S2 normal, no murmur, rub or gallop  Abdomen:  Soft, non-tender, bowel sounds active, nondistended  Extremities: No clubbing, cyanosis, or edema to lower extremities  Pulses:  2+ and symmetric in distal lower extremities  Skin: No rashes   Neurologic: Oriented x3, Normal strength to extremities    Results Review:    I have reviewed the labs, radiology results and diagnostic studies.    Results from last 7 days   Lab Units 07/15/24  1026   WBC 10*3/mm3 9.30   HEMOGLOBIN g/dL  14.9   HEMATOCRIT % 45.7   PLATELETS 10*3/mm3 333     Results from last 7 days   Lab Units 07/15/24  1026   SODIUM mmol/L 135*   POTASSIUM mmol/L 4.5   CHLORIDE mmol/L 102   CO2 mmol/L 21.5*   BUN mg/dL 12   CREATININE mg/dL 0.85   CALCIUM mg/dL 9.6   BILIRUBIN mg/dL 0.4   ALK PHOS U/L 81   ALT (SGPT) U/L 34*   AST (SGOT) U/L 26   GLUCOSE mg/dL 78     Imaging Results (Last 24 Hours)       Procedure Component Value Units Date/Time    MRI Brain With & Without Contrast [[id number]] Collected: 07/15/24 1748     Updated: 07/15/24 1833    Narrative:      MRI OF THE BRAIN WITH AND WITHOUT CONTRAST     CLINICAL HISTORY: left sided peripheral vision loss; H53.9-Unspecified  visual disturbance; H35.81-Retinal edema.     TECHNIQUE: MRI of the brain was obtained with sagittal T1, axial  pre-gadolinium and post-gadolinium T1, coronal post-gadolinium T1,   axial FLAIR, axial T2, axial susceptibility weighted, and axial  diffusion-weighted images.     COMPARISON: No previous MRIs of the brain are available for comparison.     FINDINGS:     Mild changes of chronic small vessel ischemic phenomena are noted. The  ventricles, sulci, and cisterns are age-appropriate. There are no  abnormal foci of restricted diffusion. The major intracranial flow  related signal voids are within normal limits. There are no abnormal  foci of susceptibility artifact. The midline intracranial anatomy is  unremarkable. No abnormal foci of contrast enhancement are noted.     Incidental note is made of mild to moderate changes of inflammatory  paranasal sinus disease with mucosal thickening within the maxillary  sinuses, the ethmoid air cells, the frontal sinus, and the sphenoid  sinus. There is a small cystic-appearing focus just anterior to the left  occipital condyle which measures up to approximately 11 mm in diameter.  This is of uncertain precise etiology but of doubtful clinical  significance. I suspect that it is a synovial cyst arising from  the  articulation of the left occipital condyle with the left lateral mass of  C1.       Impression:         Essentially unremarkable MRI of the brain demonstrating only mild  changes of chronic small vessel ischemic phenomenon.     Incidental extracranial findings are as discussed in detail above.     This report was finalized on 7/15/2024 6:30 PM by Dr. Bobby Patten M.D  on Workstation: [id number]       CT Head Without Contrast [[id number]] Collected: 07/15/24 1114     Updated: 07/15/24 1747    Narrative:      EMERGENCY CT SCAN OF THE HEAD WITHOUT CONTRAST ON 07/15/2024     CLINICAL HISTORY: This is a 68-year-old female patient with history of  left-sided visual difficulty and some dizziness.     TECHNIQUE: Spiral CT images were obtained from the base of the skull to  the vertex without intravenous contrast. The images were reformatted and  are submitted in 3 mm thick axial, sagittal and coronal CT sections with  brain algorithm.     There are no prior head CTs or MRIs of the brain from [facility] for comparison.     FINDINGS: The patient has a metal left earring in place and there is  beam hardening artifact off the earring that streaks through the lateral  left temporal lobe and a portion of the anterior inferior left frontal  parenchyma and limits evaluation. Taking this into consideration, the  brain parenchyma is normal in attenuation. The ventricles are normal in  size. I see no focal mass effect. There is no midline shift. No  extra-axial fluid collections are identified. There is no evidence of  acute intracranial hemorrhage. There is mild medial right frontal sinus  and bilateral ethmoid sinus and bilateral sphenoid sinus mucosal  thickening. The mastoid and middle ear cavities are clear. Bilateral  intraocular lens implants are in place in the globes from previous  bilateral cataract surgery. Otherwise, the orbits are unremarkable.       Impression:      1. There is beam hardening  artifact off the patient's metallic left  earring that streaks through the lateral left temporal lobe and anterior  inferior left frontal lobe and limits evaluation. Taking this into  consideration, no acute intracranial abnormality is identified.     2. There is mild medial left frontal sinus and bilateral anterior  ethmoid sinus and bilateral sphenoid sinus mucosal thickening. There are  bilateral intraocular lens implants in the globes from previous  bilateral cataract surgery. The remainder of the head CT is within  normal limits. The etiology of the patient's acute left-sided visual  difficulty and dizziness is not established on this exam. If there  remains any clinical consideration of an acute stroke I recommend an MRI  of the brain for a more complete assessment. The results and  recommendations were discussed with Dr. Ceja from the ER by telephone  on 07/15/2024 at 11 a.m.     Radiation dose reduction techniques were utilized, including automated  exposure control and exposure modulation based on body size.        This report was finalized on 7/15/2024 5:44 PM by Dr. Chaz Ortega M.D  on Workstation: [id number]               I have reviewed the medications.  ---------------------------------------------------------------------------------------------  Assessment & Plan   Assessment/Problem List    Vision disturbance      Plan:    Acute left-sided visual field disturbance  -CT head without negative acute  -MRI brain with and without negative acute  -LP reassuring  -MRI orbit negative for optic neuritis  -Neurology consult, clear for discharge home    DM2  -Resume home medications at discharge     Hypertension  -Continue lisinopril    Disposition: Home    Follow-up after Discharge: PCP & neurology    This note will serve as a discharge summary    SACHI Evans 07/16/24 05:34 EDT    I have worn appropriate PPE during this patient encounter, sanitized my hands both with entering and exiting  patient's room.      46 minutes has been spent by [facility] providers in the care of this patient while under observation status

## 2024-07-16 NOTE — NURSING NOTE
Patient wanted to take all her morning medications at 0600hrs. Patient stated that she usually takes her morning medicines at 0430hrs., provider was notified. Blood glucose checked, resulted at 81mg/dl. All due AM doses were given to patient as per her regimen. She noted that PO Doxycycline is every 12 hours and its due at 9am. To note.

## 2024-07-16 NOTE — CASE MANAGEMENT/SOCIAL WORK
Discharge Planning Assessment  [facility]     Patient Name: Namrata Luz  MRN: [id number]  Today's Date: 7/16/2024    Admit Date: 7/15/2024    Plan: Plans to return home at d/Trav Abreu RN   Discharge Needs Assessment       Row Name 07/16/24 1211       Living Environment    People in Home spouse;child(angelita), dependent;other relative(s)    Name(s) of People in Home spouse Cristian; son Mando; and grandson Baljinder    Current Living Arrangements home    Potentially Unsafe Housing Conditions none    In the past 12 months has the electric, gas, oil, or water company threatened to shut off services in your home? No    Primary Care Provided by self    Family Caregiver if Needed child(angelita), adult    Family Caregiver Names vamshi Gilmore    Quality of Family Relationships supportive    Able to Return to Prior Arrangements yes       Resource/Environmental Concerns    Resource/Environmental Concerns none    Transportation Concerns none       Transportation Needs    In the past 12 months, has lack of transportation kept you from medical appointments or from getting medications? no    In the past 12 months, has lack of transportation kept you from meetings, work, or from getting things needed for daily living? No       Food Insecurity    Within the past 12 months, you worried that your food would run out before you got the money to buy more. Never true    Within the past 12 months, the food you bought just didn't last and you didn't have money to get more. Never true       Transition Planning    Patient/Family Anticipates Transition to home with family    Patient/Family Anticipated Services at Transition none    Transportation Anticipated family or friend will provide       Discharge Needs Assessment    Equipment Currently Used at Home cpap;grab bar    Concerns to be Addressed no discharge needs identified    Anticipated Changes Related to Illness none    Equipment Needed After Discharge none    Provided Post Acute Provider List? N/A     Provided Post Acute Provider Quality & Resource List? N/A                   Discharge Plan       Row Name 07/16/24 1213       Plan    Plan Plans to return home at d/c-PHIL Abreu RN    Patient/Family in Agreement with Plan yes    Provided Post Acute Provider List? N/A    Provided Post Acute Provider Quality & Resource List? N/A    Plan Comments Spoke w/ pt and friend Herlinda at bedside w/ patient's permission. Introduced self and explained role. All info on facesheet, incl PCP as XIAO Breen, verified. Lives in single story house w/ a basement and a ramp to enter the home, w/ spouse Cristian; son Mando, who is paraplegic and for whom she provides care; and grandson Baljinder. Able to navigate around home w/o difficulty. Independent w/ ADLs. Uses CPAP and grab bars at home. denies need for any DME or community resources at d/c. Uses [facility] on [facility] in [place] and is able to obtain and pay for meds. To return home at d/c, w/ family's assist as needed; friend will transport; agreeable w/ plan. CM will continue to follow-PHIL Abreu RN                  Continued Care and Services - Admitted Since 7/15/2024    No active coordination exists for this encounter.          Demographic Summary       Row Name 07/16/24 1210       General Information    Admission Type observation    Arrived From emergency department    Required Notices Provided Observation Status Notice    Referral Source admission list    Reason for Consult discharge planning    Preferred Language English       Contact Information    Permission Granted to Share Info With ;family/designee                   Functional Status       Row Name 07/16/24 1211       Functional Status    Usual Activity Tolerance good    Current Activity Tolerance good       Functional Status, IADL    Medications independent    Meal Preparation independent    Housekeeping independent    Laundry independent    Shopping independent       Mental Status    General  Appearance WDL WDL       Mental Status Summary    Recent Changes in Mental Status/Cognitive Functioning no changes                   Psychosocial       Row Name 07/16/24 1211       Behavior WDL    Behavior WDL WDL       Emotion Mood WDL    Emotion/Mood/Affect WDL WDL       Speech WDL    Speech WDL WDL       Perceptual State WDL    Perceptual State WDL WDL       Thought Process WDL    Thought Process WDL WDL       Intellectual Performance WDL    Intellectual Performance WDL WDL    Level of Consciousness Alert                   Abuse/Neglect    No documentation.                  Legal    No documentation.                  Substance Abuse    No documentation.                  Patient Forms    No documentation.                     Chio Abreu RN

## 2024-07-17 ENCOUNTER — TRANSITIONAL CARE MANAGEMENT TELEPHONE ENCOUNTER (OUTPATIENT)
Dept: CALL CENTER | Facility: HOSPITAL | Age: 69
End: 2024-07-17
Payer: MEDICARE

## 2024-07-17 LAB — OLIGOCLONAL BANDS.IT SER+CSF QL: NORMAL

## 2024-07-17 RX ORDER — SPIRONOLACTONE 50 MG/1
50 TABLET, FILM COATED ORAL DAILY
Qty: 90 TABLET | Refills: 3 | Status: SHIPPED | OUTPATIENT
Start: 2024-07-17

## 2024-07-17 NOTE — OUTREACH NOTE
Prep Survey      Flowsheet Row Responses   [facility] [employer] facility patient discharged from? [facility]   Is LACE score < 7 ? Yes   Eligibility [facility]   Date of Admission 07/15/24   Date of Discharge 07/16/24   Discharge Disposition Home or Self Care   Discharge diagnosis Vision disturbance   Does the patient have one of the following disease processes/diagnoses(primary or secondary)? Other   Does the patient have Home health ordered? No   Is there a DME ordered? No   Prep survey completed? Yes            CORNEL RANDALL - Registered Nurse

## 2024-07-17 NOTE — OUTREACH NOTE
Call Center TCM Note      Flowsheet Row Responses   [facility] patient discharged from? [facility]   Does the patient have one of the following disease processes/diagnoses(primary or secondary)? Other   TCM attempt successful? Yes   Call start time 1003   Call end time 1010   Discharge diagnosis Vision disturbance   Meds reviewed with patient/caregiver? Yes   Does the patient have all medications ordered at discharge? N/A   Is the patient taking all medications as directed (includes completed medication regime)? Yes   Medication comments No changes   Comments Pt has already contact PCP and will follow up in Aug. She awaiting to hear from Neuro regarding f/u appt.   Does the patient have an appointment with their PCP within 7-14 days of discharge? No   Psychosocial issues? Yes   Psychosocial comments Takes care of paraplegic son that lives with her and her .   Did the patient receive a copy of their discharge instructions? Yes   Nursing interventions Reviewed instructions with patient   What is the patient's perception of their health status since discharge? Same   Is the patient/caregiver able to teach back signs and symptoms related to disease process for when to call PCP? Yes   Is the patient/caregiver able to teach back signs and symptoms related to disease process for when to call 911? Yes   Is the patient/caregiver able to teach back the hierarchy of who to call/visit for symptoms/problems? PCP, Specialist, Home health nurse, Urgent Care, ED, 911 Yes   If the patient is a current smoker, are they able to teach back resources for cessation? Not a smoker   Additional teach back comments States she is tired and didn't sleep well in the hospital. Vision in left eye is the same.  She is waiting to her from neuro regarding an appt and result of spinal tap.   TCM call completed? Yes   Wrap up additional comments No questions or needs at this time.   Call end time 1010            Snow Harris  LPN    7/17/2024, 10:12 EDT

## 2024-07-20 LAB
AQP4 H2O CHANNEL IGG CSF QL CBA IFA: NEGATIVE
MOG AB SER QL CBA IFA: NEGATIVE

## 2024-07-24 DIAGNOSIS — E11.9 TYPE 2 DIABETES MELLITUS WITHOUT COMPLICATION, WITHOUT LONG-TERM CURRENT USE OF INSULIN: ICD-10-CM

## 2024-07-24 DIAGNOSIS — E78.2 MIXED HYPERLIPIDEMIA: ICD-10-CM

## 2024-07-24 DIAGNOSIS — E03.9 ACQUIRED HYPOTHYROIDISM: ICD-10-CM

## 2024-07-24 RX ORDER — FENOFIBRATE 145 MG/1
TABLET, COATED ORAL
Qty: 90 TABLET | Refills: 0 | Status: SHIPPED | OUTPATIENT
Start: 2024-07-24

## 2024-07-24 RX ORDER — LEVOTHYROXINE SODIUM 0.1 MG/1
100 TABLET ORAL DAILY
Qty: 90 TABLET | Refills: 0 | Status: SHIPPED | OUTPATIENT
Start: 2024-07-24

## 2024-07-24 RX ORDER — GLIPIZIDE 10 MG/1
10 TABLET, FILM COATED, EXTENDED RELEASE ORAL DAILY
Qty: 90 TABLET | Refills: 0 | Status: SHIPPED | OUTPATIENT
Start: 2024-07-24

## 2024-07-29 ENCOUNTER — TELEPHONE (OUTPATIENT)
Dept: INTERNAL MEDICINE | Facility: CLINIC | Age: 69
End: 2024-07-29
Payer: MEDICARE

## 2024-07-29 DIAGNOSIS — E11.9 TYPE 2 DIABETES MELLITUS WITHOUT COMPLICATION, WITHOUT LONG-TERM CURRENT USE OF INSULIN: ICD-10-CM

## 2024-07-29 DIAGNOSIS — E78.2 MIXED HYPERLIPIDEMIA: Primary | ICD-10-CM

## 2024-07-29 DIAGNOSIS — I10 PRIMARY HYPERTENSION: ICD-10-CM

## 2024-07-29 DIAGNOSIS — E03.9 ACQUIRED HYPOTHYROIDISM: ICD-10-CM

## 2024-07-29 NOTE — TELEPHONE ENCOUNTER
Will have labs drawn at [facility], [place], will keep appointment in August. Asked patient to have all the notes from Ophthalmology to use when she sees them on the 12th of August

## 2024-07-29 NOTE — TELEPHONE ENCOUNTER
Caller: Namrata Luz    Relationship: Self    Best call back number: [phone]    What is the best time to reach you: ANYTIME    Who are you requesting to speak with (clinical staff, provider,  specific staff member): IRIS SCHROEDER OR NURSE    What was the call regarding: PATIENT WANTS TO KNOW IF JENNA SCHROEDER WOULD LIKE TO SEE PATIENT BEFORE THE LABS ON 8/12/24 AS PATIENT IS GETTING LABS DONE AND CHEST XRAY 7/30/24 FOR OPHTHALMOLOGY FROM HER HAVING VISION PROBLEMS. AND WILL FOLLOW UP WITH OPTHALMOLOGY 8/12/24. PLEASE ADVISE.

## 2024-07-30 DIAGNOSIS — E11.9 TYPE 2 DIABETES MELLITUS WITHOUT COMPLICATION, WITHOUT LONG-TERM CURRENT USE OF INSULIN: Primary | ICD-10-CM

## 2024-07-31 ENCOUNTER — HOSPITAL ENCOUNTER (OUTPATIENT)
Dept: GENERAL RADIOLOGY | Facility: HOSPITAL | Age: 69
Discharge: HOME | End: 2024-07-31
Payer: MEDICARE

## 2024-07-31 ENCOUNTER — LAB (OUTPATIENT)
Dept: LAB | Facility: HOSPITAL | Age: 69
End: 2024-07-31
Payer: MEDICARE

## 2024-07-31 ENCOUNTER — TRANSCRIBE ORDERS (OUTPATIENT)
Dept: LAB | Facility: HOSPITAL | Age: 69
End: 2024-07-31
Payer: MEDICARE

## 2024-07-31 DIAGNOSIS — E11.9 TYPE 2 DIABETES MELLITUS WITHOUT COMPLICATION, WITHOUT LONG-TERM CURRENT USE OF INSULIN: ICD-10-CM

## 2024-07-31 DIAGNOSIS — E03.9 ACQUIRED HYPOTHYROIDISM: ICD-10-CM

## 2024-07-31 DIAGNOSIS — I10 ESSENTIAL HYPERTENSION: ICD-10-CM

## 2024-07-31 DIAGNOSIS — H47.013 ANTERIOR ISCHEMIC OPTIC NEUROPATHY OF BOTH EYES: ICD-10-CM

## 2024-07-31 DIAGNOSIS — J30.1 NON-SEASONAL ALLERGIC RHINITIS DUE TO POLLEN: ICD-10-CM

## 2024-07-31 DIAGNOSIS — R19.8 GASTROINTESTINAL SYMPTOMS: Primary | ICD-10-CM

## 2024-07-31 DIAGNOSIS — E78.2 MIXED HYPERLIPIDEMIA: ICD-10-CM

## 2024-07-31 DIAGNOSIS — R19.8 GASTROINTESTINAL SYMPTOMS: ICD-10-CM

## 2024-07-31 LAB
ALBUMIN SERPL-MCNC: 4.7 G/DL (ref 3.5–5.2)
ALBUMIN UR-MCNC: <1.2 MG/DL
ALBUMIN/GLOB SERPL: 1.5 G/DL
ALP SERPL-CCNC: 71 U/L (ref 39–117)
ALT SERPL W P-5'-P-CCNC: 48 U/L (ref 1–33)
ANION GAP SERPL CALCULATED.3IONS-SCNC: 13 MMOL/L (ref 5–15)
AST SERPL-CCNC: 33 U/L (ref 1–32)
BILIRUB SERPL-MCNC: 0.7 MG/DL (ref 0–1.2)
BUN SERPL-MCNC: 16 MG/DL (ref 8–23)
BUN/CREAT SERPL: 17 (ref 7–25)
CALCIUM SPEC-SCNC: 9.9 MG/DL (ref 8.6–10.5)
CHLORIDE SERPL-SCNC: 101 MMOL/L (ref 98–107)
CHOLEST SERPL-MCNC: 135 MG/DL (ref 0–200)
CO2 SERPL-SCNC: 23 MMOL/L (ref 22–29)
CREAT SERPL-MCNC: 0.94 MG/DL (ref 0.57–1)
CREAT UR-MCNC: 163.8 MG/DL
EGFRCR SERPLBLD CKD-EPI 2021: 66.2 ML/MIN/1.73
GLOBULIN UR ELPH-MCNC: 3.2 GM/DL
GLUCOSE SERPL-MCNC: 83 MG/DL (ref 65–99)
HBA1C MFR BLD: 5.4 % (ref 4.8–5.6)
HDLC SERPL-MCNC: 39 MG/DL (ref 40–60)
LDLC SERPL CALC-MCNC: 68 MG/DL (ref 0–100)
LDLC/HDLC SERPL: 1.63 {RATIO}
MICROALBUMIN/CREAT UR: NORMAL MG/G{CREAT}
PHOSPHATE SERPL-MCNC: 3.7 MG/DL (ref 2.5–4.5)
POTASSIUM SERPL-SCNC: 3.9 MMOL/L (ref 3.5–5.2)
PROT SERPL-MCNC: 7.9 G/DL (ref 6–8.5)
RPR SER QL: NORMAL
SODIUM SERPL-SCNC: 137 MMOL/L (ref 136–145)
TRIGL SERPL-MCNC: 163 MG/DL (ref 0–150)
TSH SERPL DL<=0.05 MIU/L-ACNC: 0.7 UIU/ML (ref 0.27–4.2)
VLDLC SERPL-MCNC: 28 MG/DL (ref 5–40)

## 2024-07-31 PROCEDURE — 84165 PROTEIN E-PHORESIS SERUM: CPT

## 2024-07-31 PROCEDURE — 85549 MURAMIDASE: CPT

## 2024-07-31 PROCEDURE — 84443 ASSAY THYROID STIM HORMONE: CPT | Performed by: PHYSICIAN ASSISTANT

## 2024-07-31 PROCEDURE — 83516 IMMUNOASSAY NONANTIBODY: CPT

## 2024-07-31 PROCEDURE — 82570 ASSAY OF URINE CREATININE: CPT | Performed by: PHYSICIAN ASSISTANT

## 2024-07-31 PROCEDURE — 86038 ANTINUCLEAR ANTIBODIES: CPT

## 2024-07-31 PROCEDURE — 83036 HEMOGLOBIN GLYCOSYLATED A1C: CPT | Performed by: PHYSICIAN ASSISTANT

## 2024-07-31 PROCEDURE — 36415 COLL VENOUS BLD VENIPUNCTURE: CPT

## 2024-07-31 PROCEDURE — 86037 ANCA TITER EACH ANTIBODY: CPT

## 2024-07-31 PROCEDURE — 80061 LIPID PANEL: CPT | Performed by: PHYSICIAN ASSISTANT

## 2024-07-31 PROCEDURE — 86592 SYPHILIS TEST NON-TREP QUAL: CPT

## 2024-07-31 PROCEDURE — 86225 DNA ANTIBODY NATIVE: CPT

## 2024-07-31 PROCEDURE — 82164 ANGIOTENSIN I ENZYME TEST: CPT

## 2024-07-31 PROCEDURE — 86611 BARTONELLA ANTIBODY: CPT

## 2024-07-31 PROCEDURE — 71046 X-RAY EXAM CHEST 2 VIEWS: CPT

## 2024-07-31 PROCEDURE — 82043 UR ALBUMIN QUANTITATIVE: CPT | Performed by: PHYSICIAN ASSISTANT

## 2024-07-31 PROCEDURE — 84100 ASSAY OF PHOSPHORUS: CPT | Performed by: PHYSICIAN ASSISTANT

## 2024-07-31 PROCEDURE — 80053 COMPREHEN METABOLIC PANEL: CPT | Performed by: PHYSICIAN ASSISTANT

## 2024-07-31 PROCEDURE — 86480 TB TEST CELL IMMUN MEASURE: CPT

## 2024-07-31 RX ORDER — LEVOTHYROXINE SODIUM 0.1 MG/1
100 TABLET ORAL DAILY
Qty: 90 TABLET | Refills: 3 | Status: SHIPPED | OUTPATIENT
Start: 2024-07-31

## 2024-07-31 RX ORDER — FENOFIBRATE 145 MG/1
145 TABLET, COATED ORAL DAILY
Qty: 90 TABLET | Refills: 3 | Status: SHIPPED | OUTPATIENT
Start: 2024-07-31

## 2024-07-31 RX ORDER — METOPROLOL SUCCINATE 100 MG/1
100 TABLET, EXTENDED RELEASE ORAL EVERY MORNING
Qty: 90 TABLET | Refills: 3 | Status: SHIPPED | OUTPATIENT
Start: 2024-07-31

## 2024-07-31 RX ORDER — SEMAGLUTIDE 2.68 MG/ML
2 INJECTION, SOLUTION SUBCUTANEOUS WEEKLY
Qty: 9 ML | Refills: 3 | Status: SHIPPED | OUTPATIENT
Start: 2024-07-31

## 2024-07-31 RX ORDER — ATORVASTATIN CALCIUM 40 MG/1
40 TABLET, FILM COATED ORAL DAILY
Qty: 90 TABLET | Refills: 3 | Status: SHIPPED | OUTPATIENT
Start: 2024-07-31

## 2024-07-31 RX ORDER — MONTELUKAST SODIUM 10 MG/1
10 TABLET ORAL NIGHTLY
Qty: 90 TABLET | Refills: 3 | Status: SHIPPED | OUTPATIENT
Start: 2024-07-31

## 2024-07-31 RX ORDER — GLIPIZIDE 10 MG/1
10 TABLET, FILM COATED, EXTENDED RELEASE ORAL DAILY
Qty: 90 TABLET | Refills: 3 | Status: SHIPPED | OUTPATIENT
Start: 2024-07-31

## 2024-07-31 RX ORDER — SPIRONOLACTONE 50 MG/1
50 TABLET, FILM COATED ORAL DAILY
Qty: 90 TABLET | Refills: 3 | Status: SHIPPED | OUTPATIENT
Start: 2024-07-31

## 2024-07-31 RX ORDER — LISINOPRIL 5 MG/1
5 TABLET ORAL DAILY
Qty: 90 TABLET | Refills: 3 | Status: SHIPPED | OUTPATIENT
Start: 2024-07-31

## 2024-08-01 LAB
ACE SERPL-CCNC: 15 U/L (ref 14–82)
ALBUMIN SERPL ELPH-MCNC: 4.2 G/DL (ref 2.9–4.4)
ALBUMIN/GLOB SERPL: 1.2 {RATIO} (ref 0.7–1.7)
ALPHA1 GLOB SERPL ELPH-MCNC: 0.3 G/DL (ref 0–0.4)
ALPHA2 GLOB SERPL ELPH-MCNC: 0.6 G/DL (ref 0.4–1)
ANA SER QL: NEGATIVE
B-GLOBULIN SERPL ELPH-MCNC: 1.2 G/DL (ref 0.7–1.3)
C-ANCA TITR SER IF: NORMAL TITER
DSDNA AB SER-ACNC: <1 IU/ML (ref 0–9)
GAMMA GLOB SERPL ELPH-MCNC: 1.3 G/DL (ref 0.4–1.8)
GLOBULIN SER CALC-MCNC: 3.4 G/DL (ref 2.2–3.9)
LABORATORY COMMENT REPORT: NORMAL
M PROTEIN SERPL ELPH-MCNC: NORMAL G/DL
MYELOPEROXIDASE AB SER IA-ACNC: <0.2 UNITS (ref 0–0.9)
P-ANCA ATYPICAL TITR SER IF: NORMAL TITER
P-ANCA TITR SER IF: NORMAL TITER
PROT SERPL-MCNC: 7.6 G/DL (ref 6–8.5)
PROTEINASE3 AB SER IA-ACNC: <0.2 UNITS (ref 0–0.9)

## 2024-08-02 LAB
GAMMA INTERFERON BACKGROUND BLD IA-ACNC: 0 IU/ML
LYSOZYME SERPL-MCNC: 7.1 UG/ML (ref 3.6–8.1)
M TB IFN-G BLD-IMP: NEGATIVE
M TB IFN-G CD4+ BCKGRND COR BLD-ACNC: 0 IU/ML
M TB IFN-G CD4+CD8+ BCKGRND COR BLD-ACNC: 0 IU/ML
MITOGEN IGNF BCKGRD COR BLD-ACNC: 7.38 IU/ML
QUANTIFERON INCUBATION: NORMAL
SERVICE CMNT-IMP: NORMAL

## 2024-08-06 LAB
B HENSELAE IGG TITR SER IF: NEGATIVE TITER
B HENSELAE IGM TITR SER IF: NEGATIVE TITER
B QUINTANA IGG TITR SER IF: NEGATIVE TITER
B QUINTANA IGM TITR SER IF: NEGATIVE TITER

## 2024-08-08 LAB — HLA-B27 QL NAA+PROBE: NEGATIVE

## 2024-08-19 ENCOUNTER — OFFICE VISIT (OUTPATIENT)
Dept: INTERNAL MEDICINE | Facility: CLINIC | Age: 69
End: 2024-08-19
Payer: MEDICARE

## 2024-08-19 VITALS
DIASTOLIC BLOOD PRESSURE: 68 MMHG | SYSTOLIC BLOOD PRESSURE: 102 MMHG | BODY MASS INDEX: 26.98 KG/M2 | WEIGHT: 178 LBS | TEMPERATURE: 98.1 F | HEIGHT: 68 IN

## 2024-08-19 DIAGNOSIS — E78.2 MIXED HYPERLIPIDEMIA: Primary | ICD-10-CM

## 2024-08-19 DIAGNOSIS — E11.9 TYPE 2 DIABETES MELLITUS WITHOUT COMPLICATION, WITHOUT LONG-TERM CURRENT USE OF INSULIN: ICD-10-CM

## 2024-08-19 DIAGNOSIS — I10 PRIMARY HYPERTENSION: ICD-10-CM

## 2024-08-19 PROCEDURE — 1159F MED LIST DOCD IN RCRD: CPT | Performed by: PHYSICIAN ASSISTANT

## 2024-08-19 PROCEDURE — 1125F AMNT PAIN NOTED PAIN PRSNT: CPT | Performed by: PHYSICIAN ASSISTANT

## 2024-08-19 PROCEDURE — 3074F SYST BP LT 130 MM HG: CPT | Performed by: PHYSICIAN ASSISTANT

## 2024-08-19 PROCEDURE — 1160F RVW MEDS BY RX/DR IN RCRD: CPT | Performed by: PHYSICIAN ASSISTANT

## 2024-08-19 PROCEDURE — 3044F HG A1C LEVEL LT 7.0%: CPT | Performed by: PHYSICIAN ASSISTANT

## 2024-08-19 PROCEDURE — 3078F DIAST BP <80 MM HG: CPT | Performed by: PHYSICIAN ASSISTANT

## 2024-08-19 PROCEDURE — 99214 OFFICE O/P EST MOD 30 MIN: CPT | Performed by: PHYSICIAN ASSISTANT

## 2024-08-19 RX ORDER — ASPIRIN 81 MG/1
81 TABLET, CHEWABLE ORAL DAILY
COMMUNITY

## 2024-08-19 NOTE — PROGRESS NOTES
Subjective   Chief Complaint   Patient presents with    Hypertension      6mo fup     Diabetes       History of Present Illness     Pt is here today for fup on her HTN, DM and HLD. Vision is slowly improving. No clear etiology found. She is on ASA 81 mg now.      Patient Active Problem List   Diagnosis    Asthma    Mixed anxiety depressive disorder    Hyperlipidemia    Hypertension    Hypothyroidism    Lung nodule, solitary    Psoriasis    Vitamin D deficiency    Type 2 diabetes mellitus without complication    Chronic pain of left knee    CAMEJO (nonalcoholic steatohepatitis)    Allergic rhinitis    Bilateral carpal tunnel syndrome    Sleep apnea       Allergies   Allergen Reactions    Morphine Hives    Morphine And Codeine Hives    Neosporin [Bacitracin-Polymyxin B] Irritability    Penicillins Hives       Current Outpatient Medications on File Prior to Visit   Medication Sig Dispense Refill    Accu-Chek Tavia Plus test strip USE AS DIRECTED TO TEST BLOOD SUGAR once DAILY 100 each 1    Accu-Chek Softclix Lancets lancets USE AS DIRECTED TO TEST BLOOD SUGAR DAILY 100 each 1    albuterol sulfate  (90 Base) MCG/ACT inhaler Inhale 2 puffs Every 4 (Four) Hours As Needed for Wheezing. 18 g 0    atorvastatin (LIPITOR) 40 MG tablet Take 1 tablet by mouth Daily. 90 tablet 3    cetirizine (zyrTEC) 10 MG tablet Take 1 tablet by mouth Daily.      clobetasol (TEMOVATE) 0.05 % external solution Apply 1 application topically to the appropriate area as directed As Needed (for psoriasis). 50 mL 2    empagliflozin (Jardiance) 25 MG tablet tablet Take 1 tablet by mouth Daily. 90 tablet 3    fenofibrate (TRICOR) 145 MG tablet Take 1 tablet by mouth Daily. 90 tablet 3    fluocinolone (SYNALAR) 0.01 % external solution Apply  topically to the appropriate area as directed 2 (Two) Times a Day. 60 mL 2    glipizide (GLUCOTROL XL) 10 MG 24 hr tablet Take 1 tablet by mouth Daily. 90 tablet 3    glucose monitor monitoring kit Use 1 each  As Needed (test as directed). accu check avia plus meter 1 each 0    levothyroxine (SYNTHROID, LEVOTHROID) 100 MCG tablet Take 1 tablet by mouth Daily. 90 tablet 3    lisinopril (PRINIVIL,ZESTRIL) 5 MG tablet Take 1 tablet by mouth Daily. 90 tablet 3    metFORMIN (GLUCOPHAGE) 500 MG tablet Take 1 tablet by mouth 2 (Two) Times a Day With Meals. 180 tablet 3    metoprolol succinate XL (TOPROL-XL) 100 MG 24 hr tablet Take 1 tablet by mouth Every Morning. 90 tablet 3    montelukast (SINGULAIR) 10 MG tablet Take 1 tablet by mouth Every Night. 90 tablet 3    nystatin (MYCOSTATIN) 565863 UNIT/GM powder Apply  topically to the appropriate area as directed 4 (Four) Times a Day. 45 g 1    Semaglutide, 2 MG/DOSE, (Ozempic, 2 MG/DOSE,) 8 MG/3ML solution pen-injector Inject 2 mg under the skin into the appropriate area as directed 1 (One) Time Per Week. 9 mL 3    aspirin 81 MG chewable tablet Chew 1 tablet Daily.      Scopolamine 1 MG/3DAYS patch Place 1 patch on the skin as directed by provider Every 72 (Seventy-Two) Hours. (Patient not taking: Reported on 8/19/2024) 4 each 0    [DISCONTINUED] Accu-Chek Tavia Plus test strip daily 100 each 0    [DISCONTINUED] spironolactone (ALDACTONE) 50 MG tablet Take 1 tablet by mouth Daily. 90 tablet 3     No current facility-administered medications on file prior to visit.       Past Medical History:   Diagnosis Date    Allergic rhinitis     Asthma     Bilateral carpal tunnel syndrome     Endometriosis     Fractures 2007    Herpes zoster without complication     Hyperlipidemia     Hypertension     ON TOPROL for a diagnosis of MVP but was later determined no MVp and MD decided not to stop since she has been on a while. lisinopril is prescribed for diabetes    CAMEJO (nonalcoholic steatohepatitis)     Primary osteoarthritis of right knee 10/06/2020    Psoriasis     Renal insufficiency     Sleep apnea     USES C-PAP    Vitamin D deficiency        Family History   Problem Relation Age of Onset     Vaginal cancer Mother     Lung cancer Mother     Arthritis Mother     Diabetes Mother             Cancer Mother         Lung    Coronary artery disease Father     Arthritis Father     Kidney cancer Father     Cancer Father         Kidney    Coronary artery disease Brother     Asthma Sister     Osteoporosis Sister     Osteoporosis Maternal Grandmother             Osteoporosis Sister         Being treated    Malig Hyperthermia Neg Hx        Social History     Socioeconomic History    Marital status:    Tobacco Use    Smoking status: Never    Smokeless tobacco: Never    Tobacco comments:     lots of second hand smoke during childhood   Vaping Use    Vaping status: Never Used   Substance and Sexual Activity    Alcohol use: Yes     Alcohol/week: 0.0 standard drinks of alcohol     Comment: maybe have 1 drink every couple of months    Drug use: Never    Sexual activity: Defer       Past Surgical History:   Procedure Laterality Date    APPENDECTOMY      BACK SURGERY  1982    CATARACT EXTRACTION, BILATERAL Bilateral     CHOLECYSTECTOMY  'S    COLONOSCOPY      EYE SURGERY      HYSTERECTOMY  1982    JOINT REPLACEMENT      KNEE ARTHROPLASTY UNICOMPARTMENTAL Left 2018    Procedure: KNEE ARTHROPLASTY UNICOMPARTMENTAL;  Surgeon: Emanuel Jack MD;  Location: [facility];  Service: Orthopedics    LUMBAR DISCECTOMY  1982    TIBIA FASCIOTOMY Left 2006    TOTAL KNEE ARTHROPLASTY Right 2020    Procedure: TOTAL KNEE ARTHROPLASTY;  Surgeon: Emanuel Jack MD;  Location: [facility];  Service: Orthopedics;  Laterality: Right;    TUBAL ABDOMINAL LIGATION Bilateral          The following portions of the patient's history were reviewed and updated as appropriate: problem list, allergies, current medications, past medical history, past family history, past social history, and past surgical history.    ROS    See HPI    Immunization History   Administered Date(s) Administered    Arexvy  "(RSV, Adults 60+ yrs) 08/23/2023    COVID-19 (PFIZER) Purple Cap Monovalent 03/20/2021, 04/10/2021, 12/18/2021    FluMist 2-49yrs 10/02/2013, 10/01/2014, 10/07/2015    Fluzone  >6mos 12/12/2017    Fluzone High-Dose 65+yrs 08/23/2023    Fluzone Quad >6mos (Multi-dose) 09/22/2016, 12/12/2017    Hepatitis A 05/12/2018, 12/04/2018    Influenza, Unspecified 12/04/2018, 10/10/2022    Pneumococcal Conjugate 13-Valent (PCV13) 06/04/2021    Pneumococcal Polysaccharide (PPSV23) 04/17/2014    Shingrix 08/06/2022, 10/10/2022    Td (TDVAX) 10/02/2005    Tdap 04/17/2014, 09/15/2019    Zostavax 08/24/2012       Objective   Vitals:    08/19/24 0911   BP: 102/68   Temp: 98.1 °F (36.7 °C)   Weight: 80.7 kg (178 lb)   Height: 172.7 cm (67.99\")     Body mass index is 27.07 kg/m².  Physical Exam  Vitals reviewed.   Constitutional:       Appearance: Normal appearance.   HENT:      Head: Normocephalic and atraumatic.   Cardiovascular:      Rate and Rhythm: Normal rate and regular rhythm.      Heart sounds: Normal heart sounds.   Neurological:      Mental Status: She is alert.   Psychiatric:         Mood and Affect: Mood normal.         Behavior: Behavior normal.         Thought Content: Thought content normal.         Judgment: Judgment normal.         Assessment & Plan   Diagnoses and all orders for this visit:    1. Mixed hyperlipidemia (Primary)  -     Comprehensive Metabolic Panel; Future  -     Lipid Panel With / Chol / HDL Ratio; Future    2. Type 2 diabetes mellitus without complication, without long-term current use of insulin  -     Hemoglobin A1c; Future  -     Microalbumin / Creatinine Urine Ratio - Urine, Clean Catch; Future    3. Primary hypertension     BP low, stop the Spironolactone. She will check her BP and let me know how it is doing may need tos top her Lisinopril. A1c is excellent, lipids are to goal.     Return in about 4 months (around 12/19/2024) for Lab Appt Before FUP.           "

## 2024-08-19 NOTE — PROGRESS NOTES
@1006 Pt arrived to recovery. Vital signs stable.     @1051 Swallow evaluation completed without complication. Discharge instructions discussed with patient at patients bedside. PT verbalizes understanding. Pt able to dress self without assistance.    @1108 PIV removed.     @1113 Pt discharged to home with all belongings   Ms. sandoval is a patient of Dr. Jack's.  She is seen in his absence today.  The patient had been having some drainage from her wound but this is now supposedly resolved.  The patient reports good motion of the knee.  She still has some pain, as expected, but things seem to be getting better.    The dressing was removed.  There is some dried drainage on the dressing but no active drainage from the wound.  Knee motion is from near full extension to approximately 110° of flexion.  Calf is soft.    No x-rays taken today    Assessment: Two-week status post left unicompartmental knee arthroplasty    Plan: Staples removed.  I've encouraged her to continue her therapy.  We will have her follow-up with Dr. Jack next week for a wound check.

## 2024-09-10 DIAGNOSIS — E11.9 TYPE 2 DIABETES MELLITUS WITHOUT COMPLICATION, WITHOUT LONG-TERM CURRENT USE OF INSULIN: ICD-10-CM

## 2024-09-10 DIAGNOSIS — I10 ESSENTIAL HYPERTENSION: ICD-10-CM

## 2024-09-10 RX ORDER — LISINOPRIL 5 MG/1
5 TABLET ORAL DAILY
Qty: 90 TABLET | Refills: 0 | Status: SHIPPED | OUTPATIENT
Start: 2024-09-10

## 2024-11-21 DIAGNOSIS — E78.2 MIXED HYPERLIPIDEMIA: ICD-10-CM

## 2024-11-21 DIAGNOSIS — E11.9 TYPE 2 DIABETES MELLITUS WITHOUT COMPLICATION, WITHOUT LONG-TERM CURRENT USE OF INSULIN: ICD-10-CM

## 2024-11-27 LAB
ALBUMIN SERPL-MCNC: 4.4 G/DL (ref 3.5–5.2)
ALBUMIN/CREAT UR: 7 MG/G CREAT (ref 0–29)
ALBUMIN/GLOB SERPL: 1.7 G/DL
ALP SERPL-CCNC: 101 U/L (ref 39–117)
ALT SERPL-CCNC: 29 U/L (ref 1–33)
AST SERPL-CCNC: 28 U/L (ref 1–32)
BILIRUB SERPL-MCNC: 0.6 MG/DL (ref 0–1.2)
BUN SERPL-MCNC: 16 MG/DL (ref 8–23)
BUN/CREAT SERPL: 17.6 (ref 7–25)
CALCIUM SERPL-MCNC: 9.9 MG/DL (ref 8.6–10.5)
CHLORIDE SERPL-SCNC: 101 MMOL/L (ref 98–107)
CHOLEST SERPL-MCNC: 115 MG/DL (ref 0–200)
CHOLEST/HDLC SERPL: 3.48 {RATIO}
CO2 SERPL-SCNC: 26.6 MMOL/L (ref 22–29)
CREAT SERPL-MCNC: 0.91 MG/DL (ref 0.57–1)
CREAT UR-MCNC: 154 MG/DL
EGFRCR SERPLBLD CKD-EPI 2021: 68.9 ML/MIN/1.73
GLOBULIN SER CALC-MCNC: 2.6 GM/DL
GLUCOSE SERPL-MCNC: 83 MG/DL (ref 65–99)
HBA1C MFR BLD: 5.5 % (ref 4.8–5.6)
HDLC SERPL-MCNC: 33 MG/DL (ref 40–60)
LDLC SERPL CALC-MCNC: 56 MG/DL (ref 0–100)
MICROALBUMIN UR-MCNC: 10.9 UG/ML
POTASSIUM SERPL-SCNC: 4.9 MMOL/L (ref 3.5–5.2)
PROT SERPL-MCNC: 7 G/DL (ref 6–8.5)
SODIUM SERPL-SCNC: 140 MMOL/L (ref 136–145)
TRIGL SERPL-MCNC: 151 MG/DL (ref 0–150)
VLDLC SERPL CALC-MCNC: 26 MG/DL (ref 5–40)

## 2024-12-02 LAB
TSH SERPL DL<=0.005 MIU/L-ACNC: 0.66 UIU/ML (ref 0.27–4.2)
WRITTEN AUTHORIZATION: NORMAL

## 2024-12-03 RX ORDER — FLUOCINOLONE ACETONIDE 0.1 MG/ML
SOLUTION TOPICAL 2 TIMES DAILY
Qty: 60 ML | Refills: 2 | Status: SHIPPED | OUTPATIENT
Start: 2024-12-03

## 2024-12-04 ENCOUNTER — OFFICE VISIT (OUTPATIENT)
Dept: INTERNAL MEDICINE | Facility: CLINIC | Age: 69
End: 2024-12-04
Payer: MEDICARE

## 2024-12-04 VITALS — HEIGHT: 68 IN | TEMPERATURE: 98.2 F | BODY MASS INDEX: 28.64 KG/M2 | WEIGHT: 189 LBS

## 2024-12-04 DIAGNOSIS — K75.81 NASH (NONALCOHOLIC STEATOHEPATITIS): ICD-10-CM

## 2024-12-04 DIAGNOSIS — E11.9 TYPE 2 DIABETES MELLITUS WITHOUT COMPLICATION, WITHOUT LONG-TERM CURRENT USE OF INSULIN: ICD-10-CM

## 2024-12-04 DIAGNOSIS — E78.2 MIXED HYPERLIPIDEMIA: Primary | ICD-10-CM

## 2024-12-04 DIAGNOSIS — E03.9 ACQUIRED HYPOTHYROIDISM: ICD-10-CM

## 2024-12-04 PROCEDURE — 99214 OFFICE O/P EST MOD 30 MIN: CPT | Performed by: PHYSICIAN ASSISTANT

## 2024-12-04 PROCEDURE — 1160F RVW MEDS BY RX/DR IN RCRD: CPT | Performed by: PHYSICIAN ASSISTANT

## 2024-12-04 PROCEDURE — 1159F MED LIST DOCD IN RCRD: CPT | Performed by: PHYSICIAN ASSISTANT

## 2024-12-04 PROCEDURE — 3044F HG A1C LEVEL LT 7.0%: CPT | Performed by: PHYSICIAN ASSISTANT

## 2024-12-04 PROCEDURE — G2211 COMPLEX E/M VISIT ADD ON: HCPCS | Performed by: PHYSICIAN ASSISTANT

## 2024-12-04 PROCEDURE — 1125F AMNT PAIN NOTED PAIN PRSNT: CPT | Performed by: PHYSICIAN ASSISTANT

## 2024-12-09 DIAGNOSIS — E11.9 TYPE 2 DIABETES MELLITUS WITHOUT COMPLICATION, WITHOUT LONG-TERM CURRENT USE OF INSULIN: ICD-10-CM

## 2024-12-09 RX ORDER — SEMAGLUTIDE 2.68 MG/ML
2 INJECTION, SOLUTION SUBCUTANEOUS WEEKLY
Qty: 9 ML | Refills: 3 | Status: SHIPPED | OUTPATIENT
Start: 2024-12-09

## 2025-05-10 DIAGNOSIS — E78.2 MIXED HYPERLIPIDEMIA: ICD-10-CM

## 2025-05-10 DIAGNOSIS — E03.9 ACQUIRED HYPOTHYROIDISM: ICD-10-CM

## 2025-05-10 DIAGNOSIS — E11.9 TYPE 2 DIABETES MELLITUS WITHOUT COMPLICATION, WITHOUT LONG-TERM CURRENT USE OF INSULIN: ICD-10-CM

## 2025-05-12 RX ORDER — EMPAGLIFLOZIN 25 MG/1
25 TABLET, FILM COATED ORAL DAILY
Qty: 90 TABLET | Refills: 3 | Status: SHIPPED | OUTPATIENT
Start: 2025-05-12

## 2025-05-12 RX ORDER — GLIPIZIDE 10 MG/1
10 TABLET, FILM COATED, EXTENDED RELEASE ORAL DAILY
Qty: 90 TABLET | Refills: 3 | Status: SHIPPED | OUTPATIENT
Start: 2025-05-12

## 2025-05-12 RX ORDER — LEVOTHYROXINE SODIUM 100 UG/1
100 TABLET ORAL DAILY
Qty: 90 TABLET | Refills: 3 | Status: SHIPPED | OUTPATIENT
Start: 2025-05-12

## 2025-05-12 RX ORDER — ATORVASTATIN CALCIUM 40 MG/1
40 TABLET, FILM COATED ORAL DAILY
Qty: 90 TABLET | Refills: 3 | Status: SHIPPED | OUTPATIENT
Start: 2025-05-12

## 2025-06-08 DIAGNOSIS — E78.2 MIXED HYPERLIPIDEMIA: ICD-10-CM

## 2025-06-08 DIAGNOSIS — E11.9 TYPE 2 DIABETES MELLITUS WITHOUT COMPLICATION, WITHOUT LONG-TERM CURRENT USE OF INSULIN: ICD-10-CM

## 2025-06-09 RX ORDER — SEMAGLUTIDE 2.68 MG/ML
INJECTION, SOLUTION SUBCUTANEOUS
Qty: 9 ML | Refills: 3 | Status: SHIPPED | OUTPATIENT
Start: 2025-06-09

## 2025-06-09 RX ORDER — FENOFIBRATE 145 MG/1
145 TABLET, FILM COATED ORAL DAILY
Qty: 90 TABLET | Refills: 3 | Status: SHIPPED | OUTPATIENT
Start: 2025-06-09

## 2025-06-24 ENCOUNTER — OFFICE VISIT (OUTPATIENT)
Dept: INTERNAL MEDICINE | Facility: CLINIC | Age: 70
End: 2025-06-24
Payer: MEDICARE

## 2025-06-24 VITALS — BODY MASS INDEX: 27.43 KG/M2 | HEIGHT: 68 IN | WEIGHT: 181 LBS | TEMPERATURE: 98 F

## 2025-06-24 DIAGNOSIS — E03.9 ACQUIRED HYPOTHYROIDISM: ICD-10-CM

## 2025-06-24 DIAGNOSIS — E11.9 TYPE 2 DIABETES MELLITUS WITHOUT COMPLICATION, WITHOUT LONG-TERM CURRENT USE OF INSULIN: ICD-10-CM

## 2025-06-24 DIAGNOSIS — Z12.31 SCREENING MAMMOGRAM, ENCOUNTER FOR: ICD-10-CM

## 2025-06-24 DIAGNOSIS — E78.2 MIXED HYPERLIPIDEMIA: Primary | ICD-10-CM

## 2025-06-24 DIAGNOSIS — E55.9 VITAMIN D DEFICIENCY: ICD-10-CM

## 2025-06-24 PROCEDURE — 3044F HG A1C LEVEL LT 7.0%: CPT | Performed by: PHYSICIAN ASSISTANT

## 2025-06-24 PROCEDURE — 1126F AMNT PAIN NOTED NONE PRSNT: CPT | Performed by: PHYSICIAN ASSISTANT

## 2025-06-24 PROCEDURE — G0439 PPPS, SUBSEQ VISIT: HCPCS | Performed by: PHYSICIAN ASSISTANT

## 2025-06-24 RX ORDER — MOMETASONE FUROATE 1 MG/G
1 OINTMENT TOPICAL DAILY
COMMUNITY
Start: 2025-05-02

## 2025-06-24 NOTE — PROGRESS NOTES
Subjective   The ABCs of the Annual Wellness Visit  Medicare Wellness Visit      Namrata Luz is a 69 y.o. patient who presents for a Medicare Wellness Visit.    The following portions of the patient's history were reviewed and   updated as appropriate: allergies, current medications, past family history, past medical history, past social history, past surgical history, and problem list.    Compared to one year ago, the patient's physical   health is the same.  Compared to one year ago, the patient's mental   health is the same.    Recent Hospitalizations:  This patient has had a [facility] admission record on file within the last 365 days.  Current Medical Providers:  Patient Care Team:  Sujata Breen PA-C as PCP - General (Physician Assistant)  Emanuel Jack MD as Surgeon (Orthopedic Surgery)  Charlie Alegria MD as Consulting Physician (Pulmonary Disease)    Outpatient Medications Prior to Visit   Medication Sig Dispense Refill    Accu-Chek Tavia Plus test strip USE AS DIRECTED TO TEST BLOOD SUGAR once DAILY 100 each 1    Accu-Chek Softclix Lancets lancets USE AS DIRECTED TO TEST BLOOD SUGAR DAILY 100 each 1    albuterol sulfate  (90 Base) MCG/ACT inhaler Inhale 2 puffs Every 4 (Four) Hours As Needed for Wheezing. 18 g 0    aspirin 81 MG chewable tablet Chew 1 tablet Daily.      atorvastatin (LIPITOR) 40 MG tablet TAKE 1 TABLET EVERY DAY 90 tablet 3    cetirizine (zyrTEC) 10 MG tablet Take 1 tablet by mouth Daily.      fenofibrate (TRICOR) 145 MG tablet TAKE 1 TABLET EVERY DAY 90 tablet 3    glipizide (GLUCOTROL XL) 10 MG 24 hr tablet TAKE 1 TABLET EVERY DAY 90 tablet 3    glucose monitor monitoring kit Use 1 each As Needed (test as directed). accu check avia plus meter 1 each 0    Jardiance 25 MG tablet tablet TAKE 1 TABLET EVERY DAY 90 tablet 3    levothyroxine (SYNTHROID, LEVOTHROID) 100 MCG tablet TAKE 1 TABLET EVERY DAY 90 tablet 3    metFORMIN (GLUCOPHAGE) 500 MG tablet TAKE 1 TABLET  TWICE DAILY WITH MEALS 180 tablet 3    metoprolol succinate XL (TOPROL-XL) 100 MG 24 hr tablet Take 1 tablet by mouth Every Morning. 90 tablet 3    mometasone (ELOCON) 0.1 % ointment Apply 1 Application topically to the appropriate area as directed Daily.      montelukast (SINGULAIR) 10 MG tablet Take 1 tablet by mouth Every Night. 90 tablet 3    nystatin (MYCOSTATIN) 472528 UNIT/GM powder Apply  topically to the appropriate area as directed 4 (Four) Times a Day. 45 g 1    Semaglutide, 2 MG/DOSE, (Ozempic, 2 MG/DOSE,) 8 MG/3ML solution pen-injector INJECT 2MG UNDER THE SKIN INTO THE APPROPRIATE AREA AS DIRECTED ONCE A WEEK. 9 mL 3    clobetasol (TEMOVATE) 0.05 % external solution Apply 1 application topically to the appropriate area as directed As Needed (for psoriasis). 50 mL 2    fluocinolone (SYNALAR) 0.01 % external solution Apply  topically to the appropriate area as directed 2 (Two) Times a Day. 60 mL 2     No facility-administered medications prior to visit.     No opioid medication identified on active medication list. I have reviewed chart for other potential  high risk medication/s and harmful drug interactions in the elderly.      Aspirin is on active medication list. Aspirin use is indicated based on review of current medical condition/s. Pros and cons of this therapy have been discussed today. Benefits of this medication outweigh potential harm.  Patient has been encouraged to continue taking this medication.  .      Patient Active Problem List   Diagnosis    Asthma    Mixed anxiety depressive disorder    Hyperlipidemia    Hypothyroidism    Lung nodule, solitary    Psoriasis    Vitamin D deficiency    Type 2 diabetes mellitus without complication    Chronic pain of left knee    CAMEJO (nonalcoholic steatohepatitis)    Allergic rhinitis    Bilateral carpal tunnel syndrome    Sleep apnea     Advance Care Planning Advance Directive is on file.  ACP discussion was held with the patient during this visit.  "Patient has an advance directive in EMR which is still valid.             Objective   Vitals:    25 1514   Temp: 98 °F (36.7 °C)   Weight: 82.1 kg (181 lb)   Height: 172.7 cm (67.99\")   PainSc: 0-No pain       Estimated body mass index is 27.53 kg/m² as calculated from the following:    Height as of this encounter: 172.7 cm (67.99\").    Weight as of this encounter: 82.1 kg (181 lb).                Does the patient have evidence of cognitive impairment? No  Lab Results   Component Value Date    CHLPL 121 2025    TRIG 121 2025    HDL 38 (L) 2025    LDL 61 2025    VLDL 22 2025    HGBA1C 5.30 2025                                                                                                Health  Risk Assessment    Smoking Status:  Social History     Tobacco Use   Smoking Status Never   Smokeless Tobacco Never   Tobacco Comments    lots of second hand smoke during childhood     Alcohol Consumption:  Social History     Substance and Sexual Activity   Alcohol Use Not Currently    Comment: maybe have 1 drink every couple of months       Fall Risk Screen  STEADI Fall Risk Assessment was completed, and patient is at LOW risk for falls.Assessment completed on:2025    Depression Screening   Little interest or pleasure in doing things? Not at all   Feeling down, depressed, or hopeless? Not at all   PHQ-2 Total Score 0      Health Habits and Functional and Cognitive Screenin/17/2025     8:35 AM   Functional & Cognitive Status   Do you have difficulty preparing food and eating? No   Do you have difficulty bathing yourself, getting dressed or grooming yourself? No   Do you have difficulty using the toilet? No   Do you have difficulty moving around from place to place? No   Do you have trouble with steps or getting out of a bed or a chair? No   Current Diet Well Balanced Diet   Dental Exam Up to date   Eye Exam Up to date   Exercise (times per week) 1 times per week "   Current Exercises Include Walking   Do you need help using the phone?  No   Are you deaf or do you have serious difficulty hearing?  No   Do you need help to go to places out of walking distance? No   Do you need help shopping? No   Do you need help preparing meals?  No   Do you need help with housework?  No   Do you need help with laundry? No   Do you need help taking your medications? No   Do you need help managing money? No   Do you ever drive or ride in a car without wearing a seat belt? No   Have you felt unusual fatigue (could be tiredness), stress, anger or loneliness in the last month? No    Who do you live with? Other   If you need help, do you have trouble finding someone available to you? No   Have you been bothered in the last four weeks by sexual problems? No   Do you have difficulty concentrating, remembering or making decisions? No       Data saved with a previous flowsheet row definition           Age-appropriate Screening Schedule:  Refer to the list below for future screening recommendations based on patient's age, sex and/or medical conditions. Orders for these recommended tests are listed in the plan section. The patient has been provided with a written plan.    Health Maintenance List  Health Maintenance   Topic Date Due    COLORECTAL CANCER SCREENING  01/01/2025    ANNUAL WELLNESS VISIT  02/19/2025    MAMMOGRAM  06/17/2025    COVID-19 Vaccine (4 - 2024-25 season) 12/24/2025 (Originally 9/1/2024)    INFLUENZA VACCINE  07/01/2025    DIABETIC EYE EXAM  11/08/2025    HEMOGLOBIN A1C  12/03/2025    LIPID PANEL  06/03/2026    URINE MICROALBUMIN-CREATININE RATIO (uACR)  06/03/2026    DIABETIC FOOT EXAM  06/24/2026    TDAP/TD VACCINES (4 - Td or Tdap) 09/15/2029    HEPATITIS C SCREENING  Completed    ZOSTER VACCINE  Completed    Pneumococcal Vaccine 50+  Discontinued    DXA SCAN  Discontinued                                                                                                                 "                                CMS Preventative Services Quick Reference  Risk Factors Identified During Encounter  None Identified    The above risks/problems have been discussed with the patient.  Pertinent information has been shared with the patient in the After Visit Summary.  An After Visit Summary and PPPS were made available to the patient.    Follow Up:   Next Medicare Wellness visit to be scheduled in 1 year.         Additional E&M Note during same encounter follows:  Patient has additional, significant, and separately identifiable condition(s)/problem(s) that require work above and beyond the Medicare Wellness Visit     Chief Complaint  Medicare Wellness-subsequent    Subjective   HPI          Pt is here today for medicare wellness and fup on her hypothyroidism, diabetes, hld and HTN. Vision is stable. Denies CP and SOA.         Objective   Vital Signs:  Temp 98 °F (36.7 °C)   Ht 172.7 cm (67.99\")   Wt 82.1 kg (181 lb)   BMI 27.53 kg/m²   Physical Exam  Vitals reviewed.   Constitutional:       Appearance: She is well-developed.   HENT:      Head: Normocephalic and atraumatic.   Neck:      Vascular: No carotid bruit.   Cardiovascular:      Rate and Rhythm: Normal rate and regular rhythm.      Pulses:           Dorsalis pedis pulses are 2+ on the right side and 2+ on the left side.        Posterior tibial pulses are 2+ on the right side and 2+ on the left side.      Heart sounds: Normal heart sounds, S1 normal and S2 normal.   Pulmonary:      Effort: Pulmonary effort is normal.      Breath sounds: Normal breath sounds.   Feet:      Right foot:      Protective Sensation: 6 sites tested.  6 sites sensed.      Skin integrity: Skin integrity normal.      Toenail Condition: Right toenails are normal.      Left foot:      Protective Sensation: 6 sites tested.  6 sites sensed.      Skin integrity: Skin integrity normal.      Toenail Condition: Left toenails are normal.   Skin:     General: Skin is warm. "   Neurological:      General: No focal deficit present.      Mental Status: She is alert and oriented to person, place, and time.   Psychiatric:         Mood and Affect: Mood normal.         Behavior: Behavior normal.         Thought Content: Thought content normal.         Judgment: Judgment normal.              Assessment and Plan         Mixed hyperlipidemia     Acquired hypothyroidism    Vitamin D deficiency    Type 2 diabetes mellitus without complication, without long-term current use of insulin    Screening mammogram, encounter for    Diagnoses and all orders for this visit:    1. Mixed hyperlipidemia (Primary)  Overview:  Impression: 10/07/2015 - Well controlled.  Cont current meds.  Impression: 04/16/2015 - Well controlled.  Cont current meds.;     Assessment & Plan:       Orders:  -     Lipid Panel With / Chol / HDL Ratio; Future    2. Acquired hypothyroidism  -     TSH; Future    3. Vitamin D deficiency  -     Vitamin D,25-Hydroxy; Future    4. Type 2 diabetes mellitus without complication, without long-term current use of insulin  Assessment & Plan:      Orders:  -     Comprehensive Metabolic Panel; Future  -     Hemoglobin A1c; Future  -     Microalbumin / Creatinine Urine Ratio - Urine, Clean Catch; Future    5. Screening mammogram, encounter for  -     Mammo Screening Digital Tomosynthesis Bilateral With CAD; Future     A1c is well controlled. TSH is therapeutic. Lipids are to goal. BP is great. Foot exam is normal today. Due for mammogram.           Follow Up   Return in about 6 months (around 12/24/2025) for Lab Appt Before FUP.  Patient was given instructions and counseling regarding her condition or for health maintenance advice. Please see specific information pulled into the AVS if appropriate.

## 2025-08-22 DIAGNOSIS — I10 ESSENTIAL HYPERTENSION: ICD-10-CM

## 2025-08-22 RX ORDER — METOPROLOL SUCCINATE 100 MG/1
100 TABLET, EXTENDED RELEASE ORAL EVERY MORNING
Qty: 90 TABLET | Refills: 3 | Status: SHIPPED | OUTPATIENT
Start: 2025-08-22

## (undated) DEVICE — GLV SURG SENSICARE W/ALOE PF LF 7.5 STRL

## (undated) DEVICE — PREMIUM WET SKIN PREP TRAY: Brand: MEDLINE INDUSTRIES, INC.

## (undated) DEVICE — KT DRN EVAC WND PVC PCH WTROC RND 10F400

## (undated) DEVICE — BNDG ELAS ELITE V/CLOSE 6IN 5YD LF STRL

## (undated) DEVICE — GLV SURG SENSICARE W/ALOE PF LF 8 STRL

## (undated) DEVICE — DUAL CUT SAGITTAL BLADE

## (undated) DEVICE — GLV SURG SENSICARE PI MIC PF SZ7 LF STRL

## (undated) DEVICE — PENCL E/S ULTRAVAC TELESCP NOSE HOLSTR 10FT

## (undated) DEVICE — STPLR SKIN VISISTAT WD 35CT

## (undated) DEVICE — PREP SOL POVIDONE/IODINE BT 4OZ

## (undated) DEVICE — APPL DURAPREP IODOPHOR APL 26ML

## (undated) DEVICE — RECIPROCATING BLADE, DOUBLE SIDED, OFFSET  (70.0 X 0.64 X 12.6MM)

## (undated) DEVICE — 2108 SERIES SAGITTAL BLADE, NO OFFSET  (12.4 X 1.19 X 82.1MM)

## (undated) DEVICE — PICO SINGLE USE NEGATIVE PRESSURE WOUND THERAPY SYSTEM 10CM X 30CM 4IN. X 12IN.: Brand: PICO

## (undated) DEVICE — MAT FLR ABSORBENT LG 4FT 10 2.5FT

## (undated) DEVICE — MEDI-VAC YANKAUER SUCTION HANDLE W/BULBOUS TIP: Brand: CARDINAL HEALTH

## (undated) DEVICE — NEEDLE, QUINCKE 22GX3.5": Brand: MEDLINE INDUSTRIES, INC.

## (undated) DEVICE — GLV SURG SENSICARE PI PF LF 7 GRN STRL

## (undated) DEVICE — PK KN TOTL 40

## (undated) DEVICE — 3M™ IOBAN™ 2 ANTIMICROBIAL INCISE DRAPE 6640EZ: Brand: IOBAN™ 2

## (undated) DEVICE — SUT VIC 1 CT1 36IN J947H

## (undated) DEVICE — ENCORE® LATEX ORTHO SIZE 7.5, STERILE LATEX POWDER-FREE SURGICAL GLOVE: Brand: ENCORE

## (undated) DEVICE — DRSNG WND GZ CURAD OIL EMULSION 3X8IN LF STRL 1PK

## (undated) DEVICE — GLV SURG SENSICARE GREEN W/ALOE PF LF 7 STRL

## (undated) DEVICE — ADHS SKIN DERMABOND TOP ADVANCED

## (undated) DEVICE — SUT VIC 0 CT1 36IN J946H

## (undated) DEVICE — UNDERCAST PADDING: Brand: DEROYAL

## (undated) DEVICE — NDL SPINE 22G 31/2IN BLK

## (undated) DEVICE — TRAP FLD MINIVAC MEGADYNE 100ML

## (undated) DEVICE — GLV SURG SENSICARE MICRO PF LF 7 STRL

## (undated) DEVICE — RECIPROCATING BLADE HEAVY DUTY LONG, OFFSET  (77.6 X 0.77 X 11.2MM)

## (undated) DEVICE — GLV SURG PREMIERPRO ORTHO LTX PF SZ7.5 BRN

## (undated) DEVICE — SOL NACL 0.9PCT 100ML SGL

## (undated) DEVICE — STERILE PATIENT PROTECTIVE PAD FOR IMP® KNEE POSITIONERS & COHESIVE WRAP (10 / CASE): Brand: DE MAYO KNEE POSITIONER®